# Patient Record
Sex: FEMALE | Race: WHITE | Employment: OTHER | URBAN - METROPOLITAN AREA
[De-identification: names, ages, dates, MRNs, and addresses within clinical notes are randomized per-mention and may not be internally consistent; named-entity substitution may affect disease eponyms.]

---

## 2019-05-16 ENCOUNTER — TELEPHONE (OUTPATIENT)
Dept: PRIMARY CARE CLINIC | Age: 64
End: 2019-05-16

## 2019-05-16 DIAGNOSIS — G56.03 BILATERAL CARPAL TUNNEL SYNDROME: Primary | ICD-10-CM

## 2019-05-20 DIAGNOSIS — G56.03 BILATERAL CARPAL TUNNEL SYNDROME: Primary | ICD-10-CM

## 2019-05-31 ENCOUNTER — HOSPITAL ENCOUNTER (OUTPATIENT)
Dept: NEUROLOGY | Age: 64
Discharge: HOME | End: 2019-05-31
Payer: COMMERCIAL

## 2019-05-31 PROCEDURE — 95911 NRV CNDJ TEST 9-10 STUDIES: CPT

## 2019-05-31 PROCEDURE — 95886 MUSC TEST DONE W/N TEST COMP: CPT | Performed by: PSYCHIATRY & NEUROLOGY

## 2019-05-31 PROCEDURE — 95886 MUSC TEST DONE W/N TEST COMP: CPT

## 2019-05-31 PROCEDURE — 95911 NRV CNDJ TEST 9-10 STUDIES: CPT | Performed by: PSYCHIATRY & NEUROLOGY

## 2019-05-31 NOTE — PROCEDURES
paraspinals (low) N None None None None N N N N   L. Deltoid N None None None None N N N N   L. Triceps brachii N None None None None N N N N   L. Biceps brachii N None None None None N N N N   L. Brachioradialis N None None None None N N N N   L. Extensor indicis proprius N None None None None N N N N   L. Flexor digitorum profundus (Ulnar) N None None None None N N N N   L. Flexor pollicis longus N None None None None N N N N   L. First dorsal interosseous N None None None None N N N N   L. Abductor pollicis brevis Incr 1+ 1+ None None N N 1+ Decr         Nerve conduction studies of both arms disclosed the following abnormalities----absent compound motor potentials in the left median nerve, recording of the adductor pollicis brevis muscle. The distal motor latency of the right median nerve was moderately delayed, with decreased compound motor potentials. Sensory nerve potentials were not recorded from the left median nerve; there was minimal prolongation of the right median palmar and distal sensory latencies, with slightly decreased sensory nerve conduction velocities. The left F wave was not recorded; the right median F-wave latency was slightly prolonged. These findings were compared to the referential values in this laboratory, available upon request.    Monopolar examination of the left arm disclosed marked acute and chronic denervation changes in the adductor pollicis brevis muscle alone. Needle testing of the paraspinals proved unrevealing. Electrodiagnostic examination of both arms disclosed evidence diagnostic of bilateral median neuropathies at/or distal to the wrists---the left of marked severity and the right of moderate severity. These findings were consistent with carpal tunnel syndrome. There were no other peripheral neuropathies. There were no motor radiculopathies or intracanalicular lesions. Sensory radiculopathies cannot be evaluated by electrodiagnostic means.     Clinically, the patient presented with marked pains and tingling in the left hand and wrist.  On brief neurological examination, I found Tinel signs at both wrists as well as atrophy in the left abductor pollicis brevis muscle. There was sensory loss along the median nerve distributions in both hands. Her difficulties are the result of her severe left carpal tunnel syndrome. I recommended carpal tunnel release in that hand soon. Clinical correlation was highly advised.

## 2019-06-10 NOTE — TELEPHONE ENCOUNTER
Does the pended wrist brace need sent somewhere or was it given in the office?  Once that order is signed or removed you should be able to close the chart

## 2019-06-20 ENCOUNTER — PREP FOR PROCEDURE (OUTPATIENT)
Dept: ORTHOPEDIC SURGERY | Age: 64
End: 2019-06-20

## 2019-06-20 RX ORDER — SODIUM CHLORIDE 0.9 % (FLUSH) 0.9 %
10 SYRINGE (ML) INJECTION PRN
Status: CANCELLED | OUTPATIENT
Start: 2019-06-20

## 2019-06-20 RX ORDER — SODIUM CHLORIDE 0.9 % (FLUSH) 0.9 %
10 SYRINGE (ML) INJECTION EVERY 12 HOURS SCHEDULED
Status: CANCELLED | OUTPATIENT
Start: 2019-06-20

## 2019-06-26 RX ORDER — M-VIT,TX,IRON,MINS/CALC/FOLIC 27MG-0.4MG
1 TABLET ORAL DAILY
Status: ON HOLD | COMMUNITY
End: 2020-02-22 | Stop reason: HOSPADM

## 2019-06-26 RX ORDER — LEVOTHYROXINE SODIUM 0.05 MG/1
50 TABLET ORAL DAILY
COMMUNITY
End: 2019-09-03 | Stop reason: SDUPTHER

## 2019-06-26 RX ORDER — GABAPENTIN 100 MG/1
100 CAPSULE ORAL NIGHTLY
COMMUNITY
End: 2019-09-03 | Stop reason: SDUPTHER

## 2019-06-26 RX ORDER — ACETAMINOPHEN 160 MG
5000 TABLET,DISINTEGRATING ORAL DAILY
COMMUNITY

## 2019-06-26 RX ORDER — FLUTICASONE PROPIONATE 50 MCG
1 SPRAY, SUSPENSION (ML) NASAL DAILY
COMMUNITY
End: 2019-09-03 | Stop reason: SDUPTHER

## 2019-06-26 RX ORDER — TRAMADOL HYDROCHLORIDE 50 MG/1
50 TABLET ORAL EVERY 8 HOURS PRN
Status: ON HOLD | COMMUNITY
End: 2019-07-01 | Stop reason: HOSPADM

## 2019-06-26 RX ORDER — MELOXICAM 15 MG/1
15 TABLET ORAL DAILY
COMMUNITY
End: 2019-09-05 | Stop reason: SDUPTHER

## 2019-06-26 RX ORDER — IPRATROPIUM BROMIDE AND ALBUTEROL SULFATE 2.5; .5 MG/3ML; MG/3ML
1 SOLUTION RESPIRATORY (INHALATION) EVERY 4 HOURS PRN
COMMUNITY

## 2019-06-26 RX ORDER — GLIMEPIRIDE 1 MG/1
1 TABLET ORAL
COMMUNITY
End: 2019-09-03 | Stop reason: SDUPTHER

## 2019-06-26 RX ORDER — CITALOPRAM 40 MG/1
40 TABLET ORAL DAILY
COMMUNITY
End: 2019-09-03 | Stop reason: SDUPTHER

## 2019-06-26 RX ORDER — LORATADINE 10 MG/1
10 CAPSULE, LIQUID FILLED ORAL DAILY
COMMUNITY

## 2019-06-26 RX ORDER — HYDROCHLOROTHIAZIDE 12.5 MG/1
12.5 CAPSULE, GELATIN COATED ORAL DAILY
COMMUNITY
End: 2019-09-03 | Stop reason: SDUPTHER

## 2019-06-26 RX ORDER — BUDESONIDE AND FORMOTEROL FUMARATE DIHYDRATE 160; 4.5 UG/1; UG/1
2 AEROSOL RESPIRATORY (INHALATION) 2 TIMES DAILY
COMMUNITY
End: 2020-02-18 | Stop reason: ALTCHOICE

## 2019-07-01 ENCOUNTER — HOSPITAL ENCOUNTER (OUTPATIENT)
Age: 64
Setting detail: OUTPATIENT SURGERY
Discharge: HOME | End: 2019-07-01
Attending: ORTHOPAEDIC SURGERY | Admitting: ORTHOPAEDIC SURGERY
Payer: COMMERCIAL

## 2019-07-01 ENCOUNTER — ANESTHESIA EVENT (OUTPATIENT)
Dept: OPERATING ROOM | Age: 64
End: 2019-07-01
Payer: COMMERCIAL

## 2019-07-01 ENCOUNTER — PREP FOR PROCEDURE (OUTPATIENT)
Dept: ORTHOPEDIC SURGERY | Age: 64
End: 2019-07-01

## 2019-07-01 ENCOUNTER — ANESTHESIA (OUTPATIENT)
Dept: OPERATING ROOM | Age: 64
End: 2019-07-01
Payer: COMMERCIAL

## 2019-07-01 VITALS
OXYGEN SATURATION: 96 % | HEIGHT: 58 IN | BODY MASS INDEX: 39.88 KG/M2 | DIASTOLIC BLOOD PRESSURE: 76 MMHG | WEIGHT: 190 LBS | HEART RATE: 88 BPM | RESPIRATION RATE: 20 BRPM | SYSTOLIC BLOOD PRESSURE: 134 MMHG | TEMPERATURE: 97 F

## 2019-07-01 VITALS — DIASTOLIC BLOOD PRESSURE: 73 MMHG | SYSTOLIC BLOOD PRESSURE: 134 MMHG | OXYGEN SATURATION: 87 %

## 2019-07-01 DIAGNOSIS — G89.18 POST-OP PAIN: Primary | ICD-10-CM

## 2019-07-01 LAB — METER GLUCOSE: 222 MG/DL (ref 74–99)

## 2019-07-01 PROCEDURE — 6360000002 HC RX W HCPCS: Performed by: NURSE ANESTHETIST, CERTIFIED REGISTERED

## 2019-07-01 PROCEDURE — 7100000010 HC PHASE II RECOVERY - FIRST 15 MIN: Performed by: ORTHOPAEDIC SURGERY

## 2019-07-01 PROCEDURE — 2709999900 HC NON-CHARGEABLE SUPPLY: Performed by: ORTHOPAEDIC SURGERY

## 2019-07-01 PROCEDURE — 3600000002 HC SURGERY LEVEL 2 BASE: Performed by: ORTHOPAEDIC SURGERY

## 2019-07-01 PROCEDURE — 7100000011 HC PHASE II RECOVERY - ADDTL 15 MIN: Performed by: ORTHOPAEDIC SURGERY

## 2019-07-01 PROCEDURE — 3600000012 HC SURGERY LEVEL 2 ADDTL 15MIN: Performed by: ORTHOPAEDIC SURGERY

## 2019-07-01 PROCEDURE — 2500000003 HC RX 250 WO HCPCS: Performed by: ORTHOPAEDIC SURGERY

## 2019-07-01 PROCEDURE — 6360000002 HC RX W HCPCS: Performed by: PHYSICIAN ASSISTANT

## 2019-07-01 PROCEDURE — 2580000003 HC RX 258: Performed by: NURSE ANESTHETIST, CERTIFIED REGISTERED

## 2019-07-01 PROCEDURE — 82962 GLUCOSE BLOOD TEST: CPT

## 2019-07-01 PROCEDURE — 3700000000 HC ANESTHESIA ATTENDED CARE: Performed by: ORTHOPAEDIC SURGERY

## 2019-07-01 PROCEDURE — 3700000001 HC ADD 15 MINUTES (ANESTHESIA): Performed by: ORTHOPAEDIC SURGERY

## 2019-07-01 RX ORDER — FENTANYL CITRATE 50 UG/ML
INJECTION, SOLUTION INTRAMUSCULAR; INTRAVENOUS PRN
Status: DISCONTINUED | OUTPATIENT
Start: 2019-07-01 | End: 2019-07-01 | Stop reason: SDUPTHER

## 2019-07-01 RX ORDER — SODIUM CHLORIDE 0.9 % (FLUSH) 0.9 %
10 SYRINGE (ML) INJECTION EVERY 12 HOURS SCHEDULED
Status: DISCONTINUED | OUTPATIENT
Start: 2019-07-01 | End: 2019-07-01 | Stop reason: HOSPADM

## 2019-07-01 RX ORDER — PROPOFOL 10 MG/ML
INJECTION, EMULSION INTRAVENOUS PRN
Status: DISCONTINUED | OUTPATIENT
Start: 2019-07-01 | End: 2019-07-01 | Stop reason: SDUPTHER

## 2019-07-01 RX ORDER — PROPOFOL 10 MG/ML
INJECTION, EMULSION INTRAVENOUS CONTINUOUS PRN
Status: DISCONTINUED | OUTPATIENT
Start: 2019-07-01 | End: 2019-07-01 | Stop reason: SDUPTHER

## 2019-07-01 RX ORDER — SODIUM CHLORIDE 0.9 % (FLUSH) 0.9 %
10 SYRINGE (ML) INJECTION PRN
Status: DISCONTINUED | OUTPATIENT
Start: 2019-07-01 | End: 2019-07-01 | Stop reason: HOSPADM

## 2019-07-01 RX ORDER — ONDANSETRON 4 MG/1
4 TABLET, FILM COATED ORAL EVERY 8 HOURS PRN
Qty: 14 TABLET | Refills: 1 | Status: ON HOLD | OUTPATIENT
Start: 2019-07-01 | End: 2020-02-22 | Stop reason: HOSPADM

## 2019-07-01 RX ORDER — HYDROCODONE BITARTRATE AND ACETAMINOPHEN 5; 325 MG/1; MG/1
1 TABLET ORAL
Qty: 20 TABLET | Refills: 0 | Status: SHIPPED | OUTPATIENT
Start: 2019-07-01 | End: 2019-07-08

## 2019-07-01 RX ORDER — SODIUM CHLORIDE, SODIUM LACTATE, POTASSIUM CHLORIDE, CALCIUM CHLORIDE 600; 310; 30; 20 MG/100ML; MG/100ML; MG/100ML; MG/100ML
INJECTION, SOLUTION INTRAVENOUS CONTINUOUS PRN
Status: DISCONTINUED | OUTPATIENT
Start: 2019-07-01 | End: 2019-07-01 | Stop reason: SDUPTHER

## 2019-07-01 RX ORDER — HYDROCODONE BITARTRATE AND ACETAMINOPHEN 5; 325 MG/1; MG/1
1 TABLET ORAL PRN
Status: DISCONTINUED | OUTPATIENT
Start: 2019-07-01 | End: 2019-07-01 | Stop reason: HOSPADM

## 2019-07-01 RX ORDER — ONDANSETRON 2 MG/ML
INJECTION INTRAMUSCULAR; INTRAVENOUS PRN
Status: DISCONTINUED | OUTPATIENT
Start: 2019-07-01 | End: 2019-07-01 | Stop reason: SDUPTHER

## 2019-07-01 RX ORDER — MIDAZOLAM HYDROCHLORIDE 1 MG/ML
INJECTION INTRAMUSCULAR; INTRAVENOUS PRN
Status: DISCONTINUED | OUTPATIENT
Start: 2019-07-01 | End: 2019-07-01 | Stop reason: SDUPTHER

## 2019-07-01 RX ADMIN — FENTANYL CITRATE 50 MCG: 50 INJECTION, SOLUTION INTRAMUSCULAR; INTRAVENOUS at 14:18

## 2019-07-01 RX ADMIN — PROPOFOL 100 MG: 10 INJECTION, EMULSION INTRAVENOUS at 13:59

## 2019-07-01 RX ADMIN — Medication 2 G: at 13:55

## 2019-07-01 RX ADMIN — SODIUM CHLORIDE, POTASSIUM CHLORIDE, SODIUM LACTATE AND CALCIUM CHLORIDE: 600; 310; 30; 20 INJECTION, SOLUTION INTRAVENOUS at 14:30

## 2019-07-01 RX ADMIN — SODIUM CHLORIDE, POTASSIUM CHLORIDE, SODIUM LACTATE AND CALCIUM CHLORIDE: 600; 310; 30; 20 INJECTION, SOLUTION INTRAVENOUS at 13:55

## 2019-07-01 RX ADMIN — MIDAZOLAM HYDROCHLORIDE 2 MG: 1 INJECTION, SOLUTION INTRAMUSCULAR; INTRAVENOUS at 13:55

## 2019-07-01 RX ADMIN — ONDANSETRON HYDROCHLORIDE 4 MG: 2 INJECTION, SOLUTION INTRAMUSCULAR; INTRAVENOUS at 14:20

## 2019-07-01 RX ADMIN — FENTANYL CITRATE 50 MCG: 50 INJECTION, SOLUTION INTRAMUSCULAR; INTRAVENOUS at 13:59

## 2019-07-01 RX ADMIN — PROPOFOL 50 MCG/KG/MIN: 10 INJECTION, EMULSION INTRAVENOUS at 13:59

## 2019-07-01 ASSESSMENT — PULMONARY FUNCTION TESTS
PIF_VALUE: 1
PIF_VALUE: 5
PIF_VALUE: 1
PIF_VALUE: 7
PIF_VALUE: 1
PIF_VALUE: 5
PIF_VALUE: 1

## 2019-07-01 ASSESSMENT — LIFESTYLE VARIABLES: SMOKING_STATUS: 0

## 2019-07-01 ASSESSMENT — PAIN - FUNCTIONAL ASSESSMENT: PAIN_FUNCTIONAL_ASSESSMENT: 0-10

## 2019-07-01 ASSESSMENT — ENCOUNTER SYMPTOMS: SHORTNESS OF BREATH: 0

## 2019-07-01 ASSESSMENT — PAIN DESCRIPTION - DESCRIPTORS: DESCRIPTORS: THROBBING;NUMBNESS;TINGLING

## 2019-07-01 NOTE — DISCHARGE INSTR - COC
Mobility/ADLs:  Walking   {CHP DME LCLK:582661240}  Transfer  {CHP DME WXBA:871149747}  Bathing  {CHP DME TDTR:091240425}  Dressing  {CHP DME BXCN:034180967}  Toileting  {CHP DME KSQM:232381809}  Feeding  {CHP DME JENNIFER:217265416}  Med Admin  {P DME PNSH:676606058}  Med Delivery   {[facility] MED Delivery:286397033}    Wound Care Documentation and Therapy:        Elimination:  Continence:   · Bowel: {YES / DI:00811}  · Bladder: {YES / CQ:00407}  Urinary Catheter: {Urinary Catheter:686868149}   Colostomy/Ileostomy/Ileal Conduit: {YES / ZM:82596}       Date of Last BM: ***  No intake or output data in the 24 hours ending 19 1410  No intake/output data recorded.     Safety Concerns:     [employer] Safety Concerns:234513204}    Impairments/Disabilities:      [employer] Impairments/Disabilities:686345621}    Nutrition Therapy:  Current Nutrition Therapy:   [employer] Diet List:281079105}    Routes of Feeding: {[facility] DME Other Feedings:052024648}  Liquids: {Slp liquid thickness:51197}  Daily Fluid Restriction: {CHP DME Yes amt example:027081354}  Last Modified Barium Swallow with Video (Video Swallowing Test): {Done Not Done IMEJ:978809048}    Treatments at the Time of Hospital Discharge:   Respiratory Treatments: ***  Oxygen Therapy:  {Therapy; copd oxygen:13975}  Ventilator:    { CC Vent YBHK:466085214}    Rehab Therapies: {THERAPEUTIC INTERVENTION:5230340005}  Weight Bearing Status/Restrictions: [employer] Weight Bearin}  Other Medical Equipment (for information only, NOT a DME order):  {EQUIPMENT:213400003}  Other Treatments: ***    Patient's personal belongings (please select all that are sent with patient):  {[facility] DME Belongings:843708955}    RN SIGNATURE:  {Esignature:812638141}    CASE MANAGEMENT/SOCIAL WORK SECTION    Inpatient Status Date: ***    Readmission Risk Assessment Score:  Readmission Risk              Risk of Unplanned Readmission:        4           Discharging to Facility/ Agency   · Name: · Address:  · Phone:  · Fax:    Dialysis Facility (if applicable)   · Name:  · Address:  · Dialysis Schedule:  · Phone:  · Fax:    / signature: {Esignature:151116546}    PHYSICIAN SECTION    Prognosis: {Prognosis:1000855800}    Condition at Discharge: Blanca Wilson Patient Condition:546095761}    Rehab Potential (if transferring to Rehab): {Prognosis:6170829630}    Recommended Labs or Other Treatments After Discharge: ***    Physician Certification: I certify the above information and transfer of Estella Oliveira  is necessary for the continuing treatment of the diagnosis listed and that she requires {Admit to Appropriate Level of Care:49529} for {GREATER/LESS:586852006} 30 days.      Update Admission H&P: {CHP DME Changes in ZNZRM:564509609}    PHYSICIAN SIGNATURE:  {Esignature:585671948}

## 2019-07-01 NOTE — PROGRESS NOTES
Pt would like to leave underwear on
products on the day of surgery    [x] If not already done, you can expect a call from registration    [x] You can expect a call the business day prior to procedure to notify you if your arrival time changes    [x] If you receive a survey after surgery we would greatly appreciate your comments    [] Parent/guardian of a minor must accompany their child and remain on the premises  the entire time they are under our care     [] Pediatric patients may bring favorite toy, blanket or comfort item with them    [] A caregiver or family member must remain with the patient during their stay if they are mentally handicapped, have dementia, disoriented or unable to use a call light or would be a safety concern if left unattended    [x] Please notify surgeon if you develop any illness between now and time of surgery (cold, cough, sore throat, fever, nausea, vomiting) or any signs of infections  including skin, wounds, and dental.    [x]  The Outpatient Pharmacy is available to fill your prescription here on your day of surgery, ask your preop nurse for details    [] Other instructions  EDUCATIONAL MATERIALS PROVIDED:    [] PAT Preoperative Education Packet/Booklet     [] Medication List    [] Fluoroscopy Information Pamphlet    [] Transfusion bracelet applied with instructions    [] Joint replacement video reviewed    [] Shower with soap, lather and rinse well, and use CHG wipes provided the evening before surgery as instructed

## 2019-07-01 NOTE — ANESTHESIA PRE PROCEDURE
Vitamins-Minerals (THERAPEUTIC MULTIVITAMIN-MINERALS) tablet Take 1 tablet by mouth daily Ld 2019   Yes Historical Provider, MD   Cholecalciferol (VITAMIN D3) 2000 units CAPS Take by mouth daily Ld 2019   Yes Historical Provider, MD   Coenzyme Q10 (COQ-10) 10 MG CAPS Take by mouth daily Ld 2019   Yes Historical Provider, MD   aspirin 81 MG tablet Take 81 mg by mouth daily Ld 2019   Yes Historical Provider, MD   OXYGEN Inhale 2 L into the lungs as needed   Yes Historical Provider, MD   PROAIR  (90 Base) MCG/ACT inhaler INHALE TWO PUFFS BY MOUTH FOUR TIMES A DAY AS NEEDED 19  Yes Adi Sesay,        Current medications:    Current Facility-Administered Medications   Medication Dose Route Frequency Provider Last Rate Last Dose    ceFAZolin (ANCEF) 2 g in dextrose 5 % 100 mL IVPB  2 g Intravenous On Call to [address] [place] [address]        sodium chloride flush 0.9 % injection 10 mL  10 mL Intravenous 2 times per day Marlyn Antonio PA-C        sodium chloride flush 0.9 % injection 10 mL  10 mL Intravenous PRN Marlyn Antonio PA-C           Allergies: Allergies   Allergen Reactions    Macrobid [Nitrofurantoin Macrocrystal] Other (See Comments)     Exacerbates asthma       Problem List:  There is no problem list on file for this patient.       Past Medical History:        Diagnosis Date    Arthritis     Asthma     Carpal tunnel syndrome, left     COPD (chronic obstructive pulmonary disease) (HCC)     Depression     Diabetes mellitus ([facility] [address]     Hypertension     Thyroid disease        Past Surgical History:        Procedure Laterality Date     SECTION      times 3    HERNIA REPAIR      ROTATOR CUFF REPAIR Right     TONSILLECTOMY AND ADENOIDECTOMY      TOTAL KNEE ARTHROPLASTY Left     TUBAL LIGATION         Social History:    Social History     Tobacco Use    Smoking status: Former Smoker    Smokeless tobacco: Never Used   Substance Use Topics   

## 2019-07-09 ENCOUNTER — TELEPHONE (OUTPATIENT)
Dept: PRIMARY CARE CLINIC | Age: 64
End: 2019-07-09

## 2019-07-09 DIAGNOSIS — J43.9 PULMONARY EMPHYSEMA, UNSPECIFIED EMPHYSEMA TYPE (HCC): Primary | ICD-10-CM

## 2019-07-22 NOTE — ANESTHESIA POSTPROCEDURE EVALUATION
Department of Anesthesiology  Postprocedure Note    Patient: Marily Ortiz  MRN: [id number]  [place]: 1955  Date of evaluation: 7/22/2019  Time:  9:28 AM     Procedure Summary     Date:  07/01/19 Room / Location:  [facility] OR 03 / [facility] OR    Anesthesia Start:  1355 Anesthesia Stop:  1500    Procedure:  LEFT CARPAL TUNNEL RELEASE (Left ) Diagnosis:  (CARPAL TUNNEL SYNDROME LEFT HAND)    Surgeon:  Ashu Proctor MD Responsible Provider:  Padmini Keita MD    Anesthesia Type:  MAC ASA Status:  3          Anesthesia Type: MAC    Kyle Phase I: Kyle Score: 9    Kyle Phase II: Kyle Score: 9    Last vitals: Reviewed and per EMR flowsheets.        Anesthesia Post Evaluation    Patient location during evaluation: PACU  Patient participation: complete - patient participated  Level of consciousness: awake and alert  Airway patency: patent  Nausea & Vomiting: no vomiting and no nausea  Complications: no  Cardiovascular status: blood pressure returned to baseline  Respiratory status: acceptable  Hydration status: euvolemic

## 2019-08-07 ENCOUNTER — HOSPITAL ENCOUNTER (OUTPATIENT)
Dept: CT IMAGING | Age: 64
Discharge: HOME | End: 2019-08-09
Payer: COMMERCIAL

## 2019-08-07 DIAGNOSIS — M19.111 POST-TRAUMATIC ARTHROSIS OF RIGHT SHOULDER: ICD-10-CM

## 2019-08-07 DIAGNOSIS — M19.011 ARTHRITIS OF RIGHT SHOULDER REGION: ICD-10-CM

## 2019-08-07 PROCEDURE — 73200 CT UPPER EXTREMITY W/O DYE: CPT

## 2019-08-26 ENCOUNTER — TELEPHONE (OUTPATIENT)
Dept: PRIMARY CARE CLINIC | Age: 64
End: 2019-08-26

## 2019-08-26 DIAGNOSIS — E11.9 TYPE 2 DIABETES MELLITUS WITHOUT COMPLICATION, WITHOUT LONG-TERM CURRENT USE OF INSULIN (HCC): ICD-10-CM

## 2019-08-26 DIAGNOSIS — E78.2 MIXED HYPERLIPIDEMIA: ICD-10-CM

## 2019-08-26 DIAGNOSIS — E03.9 ACQUIRED HYPOTHYROIDISM: ICD-10-CM

## 2019-08-26 DIAGNOSIS — I10 ESSENTIAL HYPERTENSION: Primary | ICD-10-CM

## 2019-08-29 ENCOUNTER — HOSPITAL ENCOUNTER (OUTPATIENT)
Age: 64
Discharge: HOME | End: 2019-08-29
Payer: COMMERCIAL

## 2019-08-29 ENCOUNTER — HOSPITAL ENCOUNTER (OUTPATIENT)
Dept: PULMONOLOGY | Age: 64
Discharge: HOME | End: 2019-08-29
Payer: COMMERCIAL

## 2019-08-29 DIAGNOSIS — J44.9 STAGE 4 VERY SEVERE COPD BY GOLD CLASSIFICATION (HCC): ICD-10-CM

## 2019-08-29 DIAGNOSIS — E03.9 ACQUIRED HYPOTHYROIDISM: ICD-10-CM

## 2019-08-29 DIAGNOSIS — I10 ESSENTIAL HYPERTENSION: ICD-10-CM

## 2019-08-29 DIAGNOSIS — E11.9 TYPE 2 DIABETES MELLITUS WITHOUT COMPLICATION, WITHOUT LONG-TERM CURRENT USE OF INSULIN (HCC): ICD-10-CM

## 2019-08-29 DIAGNOSIS — E78.2 MIXED HYPERLIPIDEMIA: ICD-10-CM

## 2019-08-29 LAB
ALBUMIN SERPL-MCNC: 4.4 G/DL (ref 3.5–5.2)
ALP BLD-CCNC: 93 U/L (ref 35–104)
ALT SERPL-CCNC: 15 U/L (ref 0–32)
ANION GAP SERPL CALCULATED.3IONS-SCNC: 15 MMOL/L (ref 7–16)
AST SERPL-CCNC: 12 U/L (ref 0–31)
BASOPHILS ABSOLUTE: 0.05 E9/L (ref 0–0.2)
BASOPHILS RELATIVE PERCENT: 0.2 % (ref 0–2)
BILIRUB SERPL-MCNC: <0.2 MG/DL (ref 0–1.2)
BUN BLDV-MCNC: 22 MG/DL (ref 8–23)
CALCIUM SERPL-MCNC: 9.7 MG/DL (ref 8.6–10.2)
CHLORIDE BLD-SCNC: 99 MMOL/L (ref 98–107)
CHOLESTEROL, TOTAL: 161 MG/DL (ref 0–199)
CO2: 25 MMOL/L (ref 22–29)
CREAT SERPL-MCNC: 0.8 MG/DL (ref 0.5–1)
EOSINOPHILS ABSOLUTE: 0.01 E9/L (ref 0.05–0.5)
EOSINOPHILS RELATIVE PERCENT: 0 % (ref 0–6)
GFR AFRICAN AMERICAN: >60
GFR NON-AFRICAN AMERICAN: >60 ML/MIN/1.73
GLUCOSE BLD-MCNC: 273 MG/DL (ref 74–99)
HBA1C MFR BLD: 8.4 % (ref 4–5.6)
HCT VFR BLD CALC: 43.2 % (ref 34–48)
HDLC SERPL-MCNC: 51 MG/DL
HEMOGLOBIN: 13.9 G/DL (ref 11.5–15.5)
IMMATURE GRANULOCYTES #: 0.27 E9/L
IMMATURE GRANULOCYTES %: 1.3 % (ref 0–5)
LDL CHOLESTEROL CALCULATED: 74 MG/DL (ref 0–99)
LYMPHOCYTES ABSOLUTE: 2.26 E9/L (ref 1.5–4)
LYMPHOCYTES RELATIVE PERCENT: 11 % (ref 20–42)
MCH RBC QN AUTO: 28 PG (ref 26–35)
MCHC RBC AUTO-ENTMCNC: 32.2 % (ref 32–34.5)
MCV RBC AUTO: 86.9 FL (ref 80–99.9)
MONOCYTES ABSOLUTE: 1 E9/L (ref 0.1–0.95)
MONOCYTES RELATIVE PERCENT: 4.9 % (ref 2–12)
NEUTROPHILS ABSOLUTE: 16.9 E9/L (ref 1.8–7.3)
NEUTROPHILS RELATIVE PERCENT: 82.6 % (ref 43–80)
PDW BLD-RTO: 13.2 FL (ref 11.5–15)
PLATELET # BLD: 328 E9/L (ref 130–450)
PMV BLD AUTO: 11.6 FL (ref 7–12)
POTASSIUM SERPL-SCNC: 4.8 MMOL/L (ref 3.5–5)
RBC # BLD: 4.97 E12/L (ref 3.5–5.5)
SODIUM BLD-SCNC: 139 MMOL/L (ref 132–146)
T4 TOTAL: 8.9 MCG/DL (ref 4.5–11.7)
TOTAL PROTEIN: 7.9 G/DL (ref 6.4–8.3)
TRIGL SERPL-MCNC: 180 MG/DL (ref 0–149)
TSH SERPL DL<=0.05 MIU/L-ACNC: 0.99 UIU/ML (ref 0.27–4.2)
VLDLC SERPL CALC-MCNC: 36 MG/DL
WBC # BLD: 20.5 E9/L (ref 4.5–11.5)

## 2019-08-29 PROCEDURE — 84436 ASSAY OF TOTAL THYROXINE: CPT

## 2019-08-29 PROCEDURE — 83036 HEMOGLOBIN GLYCOSYLATED A1C: CPT

## 2019-08-29 PROCEDURE — 94729 DIFFUSING CAPACITY: CPT

## 2019-08-29 PROCEDURE — 85025 COMPLETE CBC W/AUTO DIFF WBC: CPT

## 2019-08-29 PROCEDURE — 94060 EVALUATION OF WHEEZING: CPT

## 2019-08-29 PROCEDURE — 80053 COMPREHEN METABOLIC PANEL: CPT

## 2019-08-29 PROCEDURE — 36415 COLL VENOUS BLD VENIPUNCTURE: CPT

## 2019-08-29 PROCEDURE — 94726 PLETHYSMOGRAPHY LUNG VOLUMES: CPT

## 2019-08-29 PROCEDURE — 80061 LIPID PANEL: CPT

## 2019-08-29 PROCEDURE — 84443 ASSAY THYROID STIM HORMONE: CPT

## 2019-08-29 RX ORDER — PROMETHAZINE HYDROCHLORIDE AND CODEINE PHOSPHATE 6.25; 1 MG/5ML; MG/5ML
5 SOLUTION ORAL EVERY 4 HOURS PRN
COMMUNITY
End: 2020-02-18 | Stop reason: ALTCHOICE

## 2019-08-30 ENCOUNTER — TELEPHONE (OUTPATIENT)
Dept: CARDIOLOGY CLINIC | Age: 64
End: 2019-08-30

## 2019-08-30 ENCOUNTER — OFFICE VISIT (OUTPATIENT)
Dept: PRIMARY CARE CLINIC | Age: 64
End: 2019-08-30
Payer: COMMERCIAL

## 2019-08-30 VITALS
HEIGHT: 58 IN | WEIGHT: 208 LBS | BODY MASS INDEX: 43.66 KG/M2 | DIASTOLIC BLOOD PRESSURE: 83 MMHG | TEMPERATURE: 99 F | SYSTOLIC BLOOD PRESSURE: 138 MMHG

## 2019-08-30 DIAGNOSIS — Z01.818 PRE-OP EVALUATION: Primary | ICD-10-CM

## 2019-08-30 DIAGNOSIS — I10 ESSENTIAL HYPERTENSION: ICD-10-CM

## 2019-08-30 DIAGNOSIS — E11.9 TYPE 2 DIABETES MELLITUS WITHOUT COMPLICATION, WITHOUT LONG-TERM CURRENT USE OF INSULIN (HCC): ICD-10-CM

## 2019-08-30 DIAGNOSIS — R94.31 EKG ABNORMALITIES: ICD-10-CM

## 2019-08-30 DIAGNOSIS — J43.9 PULMONARY EMPHYSEMA, UNSPECIFIED EMPHYSEMA TYPE (HCC): ICD-10-CM

## 2019-08-30 PROCEDURE — 93000 ELECTROCARDIOGRAM COMPLETE: CPT | Performed by: FAMILY MEDICINE

## 2019-08-30 PROCEDURE — 99214 OFFICE O/P EST MOD 30 MIN: CPT | Performed by: FAMILY MEDICINE

## 2019-08-30 ASSESSMENT — ENCOUNTER SYMPTOMS
GASTROINTESTINAL NEGATIVE: 1
EYES NEGATIVE: 1

## 2019-08-30 NOTE — TELEPHONE ENCOUNTER
Pt was referred by Dr Ted Knox to cardio for a pre op exam, has had EKG changes. Dr Morenita Rae is doing a shoulder revision, not yet scheduled. Pt is scheduled with Dr Fredo Lane on 09-19-19, cardiac hx to be scanned.

## 2019-08-30 NOTE — PROGRESS NOTES
19  Name: Mary Beth Romero    : 1955    Sex: female    Age: 62 y.o. Subjective:  Chief Complaint: Patient is here for daibetes ck and  Pre op cleraance     She not weariong c pap at hs            breing  [facility]   wering   02    At   2    She is seeing  Dr Emil Sanchez  And     erica maldonado ertdavid gt  New  Sleep study   Dr Silvia Edgar giving  Tramadol      Eats lots of  sweets      Review of Systems   Eyes: Negative. Respiratory:        Breathing better with Trelogy   Cardiovascular: Negative. Gastrointestinal: Negative. Genitourinary: Negative. Musculoskeletal:        Right shoudler pain with any motion         Current Outpatient Medications:     Handicap Placard MISC, by Does not apply route Copd      5 yrs, Disp: 1 each, Rfl: 0    promethazine-codeine (PHENERGAN WITH CODEINE) 6.25-10 MG/5ML SOLN solution, Take 5 mLs by mouth every 4 hours as needed for Cough. , Disp: , Rfl:     fluticasone-salmeterol (ADVAIR) 100-50 MCG/DOSE diskus inhaler, Inhale 1 puff into the lungs 2 times daily, Disp: , Rfl:     traMADol (ULTRAM) 50 MG tablet, Take 50 mg by mouth 4 times daily. , Disp: , Rfl:     ondansetron (ZOFRAN) 4 MG tablet, Take 1 tablet by mouth every 8 hours as needed for Nausea or Vomiting (Patient not taking: Reported on 2019), Disp: 14 tablet, Rfl: 1    metFORMIN (GLUCOPHAGE) 500 MG tablet, Take 500 mg by mouth 2 times daily (with meals), Disp: , Rfl:     meloxicam (MOBIC) 15 MG tablet, Take 15 mg by mouth daily, Disp: , Rfl:     levothyroxine (SYNTHROID) 50 MCG tablet, Take 50 mcg by mouth Daily, Disp: , Rfl:     hydrochlorothiazide (MICROZIDE) 12.5 MG capsule, Take 12.5 mg by mouth daily, Disp: , Rfl:     citalopram (CELEXA) 40 MG tablet, Take 40 mg by mouth daily Instructed to take am of procedure, Disp: , Rfl:     glimepiride (AMARYL) 1 MG tablet, Take 1 mg by mouth Daily with supper, Disp: , Rfl:     budesonide-formoterol (SYMBICORT) 160-4.5 MCG/ACT AERO, Inhale 2 puffs into the

## 2019-09-04 RX ORDER — ALBUTEROL SULFATE 90 UG/1
AEROSOL, METERED RESPIRATORY (INHALATION)
Qty: 25.5 G | Refills: 12 | Status: SHIPPED
Start: 2019-09-04 | End: 2020-10-31 | Stop reason: SDUPTHER

## 2019-09-04 RX ORDER — LEVOTHYROXINE SODIUM 0.05 MG/1
50 TABLET ORAL DAILY
Qty: 90 TABLET | Refills: 5 | Status: SHIPPED
Start: 2019-09-04 | End: 2020-10-31 | Stop reason: SDUPTHER

## 2019-09-04 RX ORDER — GLIMEPIRIDE 1 MG/1
1 TABLET ORAL
Qty: 180 TABLET | Refills: 12 | Status: SHIPPED
Start: 2019-09-04 | End: 2020-10-31 | Stop reason: SDUPTHER

## 2019-09-04 RX ORDER — FLUTICASONE PROPIONATE 50 MCG
1 SPRAY, SUSPENSION (ML) NASAL DAILY
Qty: 1 BOTTLE | Refills: 12 | Status: SHIPPED
Start: 2019-09-04 | End: 2020-10-31 | Stop reason: SDUPTHER

## 2019-09-04 RX ORDER — CITALOPRAM 40 MG/1
40 TABLET ORAL DAILY
Qty: 90 TABLET | Refills: 5 | Status: SHIPPED
Start: 2019-09-04 | End: 2020-09-04 | Stop reason: SDUPTHER

## 2019-09-04 RX ORDER — GABAPENTIN 100 MG/1
100 CAPSULE ORAL NIGHTLY
Qty: 90 CAPSULE | Refills: 3 | Status: SHIPPED
Start: 2019-09-04 | End: 2020-10-31 | Stop reason: SDUPTHER

## 2019-09-04 RX ORDER — HYDROCHLOROTHIAZIDE 12.5 MG/1
12.5 CAPSULE, GELATIN COATED ORAL DAILY
Qty: 90 CAPSULE | Refills: 5 | Status: SHIPPED
Start: 2019-09-04 | End: 2020-10-31 | Stop reason: SDUPTHER

## 2019-09-05 RX ORDER — MELOXICAM 15 MG/1
15 TABLET ORAL DAILY
Qty: 90 TABLET | Refills: 3 | Status: SHIPPED
Start: 2019-09-05 | End: 2020-02-18 | Stop reason: ALTCHOICE

## 2019-09-26 NOTE — PROCEDURES
[address] [address]                               PULMONARY FUNCTION    PATIENT NAME: Bharath Ivey                        :        1955  MED REC NO:   [id number]                            ROOM:  ACCOUNT NO:   [de-identified]                           ADMIT DATE: 2019  PROVIDER:     Pau Smith MD    DATE OF PROCEDURE:  2019    FINDINGS:  FVC is reduced. FEV1 is reduced. FEV1/FVC ratio is reduced. There is a significant increase in flow rates after the acute  administration of bronchodilators. Lung volumes evidenced air trapping. Diffusion capacity is reduced. IMPRESSION:  Bronchodilator responsive obstructive airways disease of  relatively mild nature. The decrease in diffusion capacity is  consistent with pulmonary parenchymal disease. Overall, the studies  would be consistent with GOLD class III chronic obstructive pulmonary  disease given the postbronchodilator numbers. Emphysematous changes are  suggested by the decrease in diffusion capacity. Clinical correlation  is advised.         Stacy Shearer MD    D: 2019 22:09:09       T: 2019 3:16:06     [email or web address]  Job#: [id number]     Doc#: [id number]    CC:

## 2019-10-03 ENCOUNTER — OFFICE VISIT (OUTPATIENT)
Dept: CARDIOLOGY CLINIC | Age: 64
End: 2019-10-03
Payer: COMMERCIAL

## 2019-10-03 VITALS
SYSTOLIC BLOOD PRESSURE: 136 MMHG | RESPIRATION RATE: 16 BRPM | DIASTOLIC BLOOD PRESSURE: 86 MMHG | WEIGHT: 208 LBS | BODY MASS INDEX: 43.66 KG/M2 | HEIGHT: 58 IN | HEART RATE: 87 BPM

## 2019-10-03 DIAGNOSIS — Z01.818 PRE-OPERATIVE CLEARANCE: Primary | ICD-10-CM

## 2019-10-03 PROCEDURE — 93000 ELECTROCARDIOGRAM COMPLETE: CPT | Performed by: INTERNAL MEDICINE

## 2019-10-03 PROCEDURE — 99244 OFF/OP CNSLTJ NEW/EST MOD 40: CPT | Performed by: INTERNAL MEDICINE

## 2019-10-03 RX ORDER — UMECLIDINIUM 62.5 UG/1
AEROSOL, POWDER ORAL
COMMUNITY
Start: 2019-09-25 | End: 2020-02-18 | Stop reason: ALTCHOICE

## 2019-10-03 RX ORDER — FLUTICASONE FUROATE AND VILANTEROL TRIFENATATE 100; 25 UG/1; UG/1
POWDER RESPIRATORY (INHALATION)
COMMUNITY
Start: 2019-09-25 | End: 2020-02-18 | Stop reason: ALTCHOICE

## 2020-02-10 ENCOUNTER — TELEPHONE (OUTPATIENT)
Dept: PRIMARY CARE CLINIC | Age: 65
End: 2020-02-10

## 2020-02-14 ENCOUNTER — OFFICE VISIT (OUTPATIENT)
Dept: PRIMARY CARE CLINIC | Age: 65
End: 2020-02-14
Payer: COMMERCIAL

## 2020-02-14 VITALS — SYSTOLIC BLOOD PRESSURE: 134 MMHG | WEIGHT: 208 LBS | BODY MASS INDEX: 40.62 KG/M2 | DIASTOLIC BLOOD PRESSURE: 82 MMHG

## 2020-02-14 PROBLEM — M19.011 PRIMARY OSTEOARTHRITIS OF RIGHT SHOULDER: Status: ACTIVE | Noted: 2020-02-14

## 2020-02-14 PROCEDURE — 99214 OFFICE O/P EST MOD 30 MIN: CPT | Performed by: FAMILY MEDICINE

## 2020-02-14 ASSESSMENT — PATIENT HEALTH QUESTIONNAIRE - PHQ9
SUM OF ALL RESPONSES TO PHQ QUESTIONS 1-9: 0
2. FEELING DOWN, DEPRESSED OR HOPELESS: 0
1. LITTLE INTEREST OR PLEASURE IN DOING THINGS: 0
SUM OF ALL RESPONSES TO PHQ9 QUESTIONS 1 & 2: 0
SUM OF ALL RESPONSES TO PHQ QUESTIONS 1-9: 0

## 2020-02-14 ASSESSMENT — ENCOUNTER SYMPTOMS
GASTROINTESTINAL NEGATIVE: 1
ALLERGIC/IMMUNOLOGIC NEGATIVE: 1
RESPIRATORY NEGATIVE: 1
EYES NEGATIVE: 1

## 2020-02-14 NOTE — PROGRESS NOTES
by mouth daily Instructed to take am of procedure, Disp: 90 tablet, Rfl: 5    glimepiride (AMARYL) 1 MG tablet, Take 1 tablet by mouth 2 times daily (before meals), Disp: 180 tablet, Rfl: 12    albuterol sulfate HFA (PROAIR HFA) 108 (90 Base) MCG/ACT inhaler, INHALE TWO PUFFS BY MOUTH FOUR TIMES A DAY AS NEEDED, Disp: 25.5 g, Rfl: 12    gabapentin (NEURONTIN) 100 MG capsule, Take 1 capsule by mouth nightly for 360 days. , Disp: 90 capsule, Rfl: 3    Handicap Placard MISC, by Does not apply route Copd      5 yrs, Disp: 1 each, Rfl: 0    promethazine-codeine (PHENERGAN WITH CODEINE) 6.25-10 MG/5ML SOLN solution, Take 5 mLs by mouth every 4 hours as needed for Cough. , Disp: , Rfl:     fluticasone-salmeterol (ADVAIR) 100-50 MCG/DOSE diskus inhaler, Inhale 1 puff into the lungs 2 times daily, Disp: , Rfl:     traMADol (ULTRAM) 50 MG tablet, Take 50 mg by mouth 4 times daily. , Disp: , Rfl:     ondansetron (ZOFRAN) 4 MG tablet, Take 1 tablet by mouth every 8 hours as needed for Nausea or Vomiting (Patient not taking: Reported on 8/14/2019), Disp: 14 tablet, Rfl: 1    budesonide-formoterol (SYMBICORT) 160-4.5 MCG/ACT AERO, Inhale 2 puffs into the lungs 2 times daily Instructed to take am of procedure, Disp: , Rfl:     ipratropium-albuterol (DUONEB) 0.5-2.5 (3) MG/3ML SOLN nebulizer solution, Inhale 1 vial into the lungs every 4 hours as needed for Shortness of Breath Instructed to take am of procedure if needed, Disp: , Rfl:     loratadine (CLARITIN) 10 MG capsule, Take 10 mg by mouth daily, Disp: , Rfl:     Multiple Vitamins-Minerals (THERAPEUTIC MULTIVITAMIN-MINERALS) tablet, Take 1 tablet by mouth daily Ld 6/26/2019, Disp: , Rfl:     Cholecalciferol (VITAMIN D3) 2000 units CAPS, Take 5,000 Units by mouth daily Ld 6/26/2019 , Disp: , Rfl:     Coenzyme Q10 (COQ-10) 10 MG CAPS, Take by mouth daily Ld 6/26/2019, Disp: , Rfl:     aspirin 81 MG tablet, Take 81 mg by mouth daily Ld 6/23/2019, Disp: , Rfl: MENISCUS L KNEE OR 2000 DR DIEHL------SEVERE OA L KNEE ON X RAY 9-11    EMG 9-14 SEVERE CTS L MOD-SEV R---REFER TO DR STEPHENSON    INJECTIONS KNEE DR GTZ    LEFT TOTAL KNEE OR 2-15 DR GTZ--    R CARPAL TUNNEL SURGEYR 3-15 DR GTZ    VITROUS SEPARATION LEFT EYE 11-15    R SHOULDER OR TORN ROTATOR CUFF 6-16 DR SURESH---PT NOT HAPPY    PT MOVED BACK HOME 2-17    VENTRAL HERNIA OR DR Lorena DIANA 9-17    EVAL DR MARROQUIN 8-18 COPD ADDED O2    RETIRED 9-18 DISABILITY 1-19    Left carpal tunnel  7-19 surgery      Past Medical History:   Diagnosis Date    Arthritis     Asthma     Carpal tunnel syndrome, left     COPD (chronic obstructive pulmonary disease) (HCC)     Depression     Diabetes mellitus (HCC)     Edema leg     Hyperlipidemia     Hypertension     Hypothyroidism     Leukocytosis     Obesity     Osteoarthritis     Sleep apnea     Thyroid disease     Type 2 diabetes mellitus without complication (HCC)      No family history on file. Past Surgical History:   Procedure Laterality Date    CARPAL TUNNEL RELEASE Left 7/1/2019    LEFT CARPAL TUNNEL RELEASE performed by Letty Ramirez MD at [address] [place] [address],[address]      times 3    DILATION AND CURETTAGE OF UTERUS      HERNIA REPAIR      ROTATOR CUFF REPAIR Right     TONSILLECTOMY AND ADENOIDECTOMY      TOTAL KNEE ARTHROPLASTY Left     TUBAL LIGATION        Vitals:    02/14/20 0816   BP: 134/82   Weight: 208 lb (94.3 kg)       Objective:    Physical Exam  Vitals signs reviewed. Constitutional:       Appearance: She is well-developed. HENT:      Head: Normocephalic. Eyes:      Pupils: Pupils are equal, round, and reactive to light. Neck:      Musculoskeletal: Normal range of motion. Cardiovascular:      Rate and Rhythm: Normal rate and regular rhythm. Pulmonary:      Effort: Pulmonary effort is normal.      Breath sounds: Normal breath sounds. Abdominal:      Palpations: Abdomen is soft.    Musculoskeletal: Comments: Marked dec rom right shouler   Skin:     General: Skin is warm. Neurological:      Mental Status: She is alert and oriented to person, place, and time. Psychiatric:         Behavior: Behavior normal.         Jase Dumont was seen today for surgical consult. Diagnoses and all orders for this visit:    Essential hypertension    Pulmonary emphysema, unspecified emphysema type ([facility] [address]    Type 2 diabetes mellitus without complication, without long-term current use of insulin (HCC)    Primary osteoarthritis of right shoulder        Comments: letter form cardio and pulm  Noted  PATIENT IS CLEARED FOR SURGERY  AS GERBER GAS OK WITH CARDIOLOGY    HE CLEARED HER IN October - d iet exer  patiet  Refuses  Statin therapy  lsoe wt  exer   A great deal of time spent reviewing medications, diet, exercise, social issues. Also reviewing the chart before entering the room with patient and finishing charting after leaving patient's room. More than half of that time was spent face to face with the patient in counseling and coordinating care. ckb s qid     Ck bs  Weekly  Dc  mobic    Follow Up: Return in about 2 months (around 4/14/2020) for lab  before.      Seen by:  Davida Ramirez DO

## 2020-02-17 ENCOUNTER — ANESTHESIA EVENT (OUTPATIENT)
Dept: OPERATING ROOM | Age: 65
DRG: 483 | End: 2020-02-17
Payer: COMMERCIAL

## 2020-02-17 ENCOUNTER — PREP FOR PROCEDURE (OUTPATIENT)
Dept: ORTHOPEDIC SURGERY | Age: 65
End: 2020-02-17

## 2020-02-17 RX ORDER — CELECOXIB 200 MG/1
200 CAPSULE ORAL ONCE
Status: CANCELLED | OUTPATIENT
Start: 2020-02-17 | End: 2020-02-17

## 2020-02-17 RX ORDER — SODIUM CHLORIDE 0.9 % (FLUSH) 0.9 %
10 SYRINGE (ML) INJECTION EVERY 12 HOURS SCHEDULED
Status: CANCELLED | OUTPATIENT
Start: 2020-02-17

## 2020-02-17 RX ORDER — SODIUM CHLORIDE, SODIUM LACTATE, POTASSIUM CHLORIDE, CALCIUM CHLORIDE 600; 310; 30; 20 MG/100ML; MG/100ML; MG/100ML; MG/100ML
INJECTION, SOLUTION INTRAVENOUS CONTINUOUS
Status: CANCELLED | OUTPATIENT
Start: 2020-02-17

## 2020-02-17 RX ORDER — GABAPENTIN 100 MG/1
600 CAPSULE ORAL ONCE
Status: CANCELLED | OUTPATIENT
Start: 2020-02-17 | End: 2020-02-17

## 2020-02-17 RX ORDER — SODIUM CHLORIDE 0.9 % (FLUSH) 0.9 %
10 SYRINGE (ML) INJECTION PRN
Status: CANCELLED | OUTPATIENT
Start: 2020-02-17

## 2020-02-17 RX ORDER — ACETAMINOPHEN 325 MG/1
1000 TABLET ORAL ONCE
Status: CANCELLED | OUTPATIENT
Start: 2020-02-17 | End: 2020-02-17

## 2020-02-17 NOTE — ANESTHESIA PRE PROCEDURE
Problem List   Diagnosis Code    Essential hypertension I10    Pulmonary emphysema ([facility] [address] J43.9    Type 2 diabetes mellitus without complication, without long-term current use of insulin ([facility] [address] E11.9    Primary osteoarthritis of right shoulder M19.011       Past Medical History:        Diagnosis Date    Arthritis     Asthma     COPD (chronic obstructive pulmonary disease) ([facility] [address]     uses O2 NC 2 L continuous     Depression     Diabetes mellitus ([facility] [address]     Edema leg     Hypertension     Hypothyroidism     Leukocytosis     Obesity     Osteoarthritis     Sleep apnea     no CPAP     Thyroid disease     Type 2 diabetes mellitus without complication (HCC)        Past Surgical History:        Procedure Laterality Date    CARPAL TUNNEL RELEASE Left 2019    LEFT CARPAL TUNNEL RELEASE performed by [facility]    DILATION AND CURETTAGE OF UTERUS      HERNIA REPAIR      ROTATOR CUFF REPAIR Right     TONSILLECTOMY AND ADENOIDECTOMY      TOTAL KNEE ARTHROPLASTY Left     TUBAL LIGATION         Social History:    Social History     Tobacco Use    Smoking status: Former Smoker     Packs/day: 1.00     Years: 30.00     Pack years: 30.00     Last attempt to quit: 2017     Years since quittin.8    Smokeless tobacco: Never Used   Substance Use Topics    Alcohol use: Yes     Comment: rare                                Counseling given: Not Answered      Vital Signs (Current):   Vitals:    20 1038   BP: 130/81   Pulse: 90   Resp: 18   Temp: 98.5 °F (36.9 °C)   TempSrc: Oral   Weight: 209 lb (94.8 kg)   Height: 4' 10\" (1.473 m)                                              BP Readings from Last 3 Encounters:   20 130/81   20 134/82   01/15/20 124/84       NPO Status:  greater than 8 hours                                                                               BMI:   Wt Readings from Last 3 Encounters:   20 209 lb (94.8 kg)   02/14/20 208 lb (94.3 kg)   01/15/20 209 lb (94.8 kg)     Body mass index is 43.68 kg/m². CBC:   Lab Results   Component Value Date    WBC 20.5 08/29/2019    RBC 4.97 08/29/2019    HGB 13.9 08/29/2019    HCT 43.2 08/29/2019    MCV 86.9 08/29/2019    RDW 13.2 08/29/2019     08/29/2019       CMP:   Lab Results   Component Value Date     08/29/2019    K 4.8 08/29/2019    CL 99 08/29/2019    CO2 25 08/29/2019    BUN 22 08/29/2019    CREATININE 0.8 08/29/2019    GFRAA >60 08/29/2019    LABGLOM >60 08/29/2019    GLUCOSE 273 08/29/2019    PROT 7.9 08/29/2019    CALCIUM 9.7 08/29/2019    BILITOT <0.2 08/29/2019    ALKPHOS 93 08/29/2019    AST 12 08/29/2019    ALT 15 08/29/2019       POC Tests: No results for input(s): POCGLU, POCNA, POCK, POCCL, POCBUN, POCHEMO, POCHCT in the last 72 hours. Coags: No results found for: PROTIME, INR, APTT    HCG (If Applicable): No results found for: PREGTESTUR, PREGSERUM, HCG, HCGQUANT     ABGs: No results found for: PHART, PO2ART, RML0WRH, PEI1ZDD, BEART, Q2IWCSAA     Type & Screen (If Applicable):  No results found for: LABABO, [address]    Anesthesia Evaluation  Patient summary reviewed no history of anesthetic complications:   Airway: Mallampati: III  TM distance: <3 FB     Mouth opening: > = 3 FB Dental:          Pulmonary: breath sounds clear to auscultation  (+) COPD (on 2 liters oxygen by NC usually all the time; daily inhalers and prn nebulizer ):  sleep apnea:  asthma:     (-) recent URI                          ROS comment: Quit smoking in May 2017    Probable SAROJ based on body habitus and h/o snoring;   Never had sleep study   Cardiovascular:  Exercise tolerance: poor (<4 METS),   (+) hypertension:,     (-) past MI, CAD and CABG/stent      Rhythm: regular  Rate: normal                    Neuro/Psych:   (+) neuromuscular disease:, psychiatric history:depression/anxiety             GI/Hepatic/Renal: Neg GI/Hepatic/Renal ROS       (-) hiatal hernia, GERD, PUD,

## 2020-02-18 ENCOUNTER — HOSPITAL ENCOUNTER (OUTPATIENT)
Dept: GENERAL RADIOLOGY | Age: 65
Discharge: HOME | End: 2020-02-20
Payer: COMMERCIAL

## 2020-02-18 ENCOUNTER — HOSPITAL ENCOUNTER (OUTPATIENT)
Dept: PREADMISSION TESTING | Age: 65
Setting detail: SURGERY ADMIT
Discharge: HOME | DRG: 483 | End: 2020-02-18
Payer: COMMERCIAL

## 2020-02-18 VITALS
TEMPERATURE: 98.5 F | RESPIRATION RATE: 18 BRPM | HEIGHT: 58 IN | DIASTOLIC BLOOD PRESSURE: 81 MMHG | BODY MASS INDEX: 43.87 KG/M2 | SYSTOLIC BLOOD PRESSURE: 130 MMHG | WEIGHT: 209 LBS | HEART RATE: 90 BPM

## 2020-02-18 LAB
ANION GAP SERPL CALCULATED.3IONS-SCNC: 14 MMOL/L (ref 7–16)
BUN BLDV-MCNC: 19 MG/DL (ref 8–23)
CALCIUM SERPL-MCNC: 9.4 MG/DL (ref 8.6–10.2)
CHLORIDE BLD-SCNC: 98 MMOL/L (ref 98–107)
CO2: 25 MMOL/L (ref 22–29)
CREAT SERPL-MCNC: 0.7 MG/DL (ref 0.5–1)
GFR AFRICAN AMERICAN: >60
GFR NON-AFRICAN AMERICAN: >60 ML/MIN/1.73
GLUCOSE BLD-MCNC: 224 MG/DL (ref 74–99)
HCT VFR BLD CALC: 43 % (ref 34–48)
HEMOGLOBIN: 14.1 G/DL (ref 11.5–15.5)
MCH RBC QN AUTO: 27.4 PG (ref 26–35)
MCHC RBC AUTO-ENTMCNC: 32.8 % (ref 32–34.5)
MCV RBC AUTO: 83.7 FL (ref 80–99.9)
PDW BLD-RTO: 13.6 FL (ref 11.5–15)
PLATELET # BLD: 321 E9/L (ref 130–450)
PMV BLD AUTO: 11.9 FL (ref 7–12)
POTASSIUM SERPL-SCNC: 4.4 MMOL/L (ref 3.5–5)
RBC # BLD: 5.14 E12/L (ref 3.5–5.5)
SODIUM BLD-SCNC: 137 MMOL/L (ref 132–146)
WBC # BLD: 14.6 E9/L (ref 4.5–11.5)

## 2020-02-18 PROCEDURE — 80048 BASIC METABOLIC PNL TOTAL CA: CPT

## 2020-02-18 PROCEDURE — 71046 X-RAY EXAM CHEST 2 VIEWS: CPT

## 2020-02-18 PROCEDURE — 87081 CULTURE SCREEN ONLY: CPT

## 2020-02-18 PROCEDURE — 85027 COMPLETE CBC AUTOMATED: CPT

## 2020-02-18 PROCEDURE — 36415 COLL VENOUS BLD VENIPUNCTURE: CPT

## 2020-02-18 RX ORDER — ROPIVACAINE HYDROCHLORIDE 5 MG/ML
30 INJECTION, SOLUTION EPIDURAL; INFILTRATION; PERINEURAL ONCE
Status: CANCELLED | OUTPATIENT
Start: 2020-02-18

## 2020-02-18 RX ORDER — MIDAZOLAM HYDROCHLORIDE 1 MG/ML
1 INJECTION INTRAMUSCULAR; INTRAVENOUS PRN
Status: CANCELLED | OUTPATIENT
Start: 2020-02-18

## 2020-02-18 RX ORDER — FENTANYL CITRATE 50 UG/ML
50 INJECTION, SOLUTION INTRAMUSCULAR; INTRAVENOUS PRN
Status: CANCELLED | OUTPATIENT
Start: 2020-02-18

## 2020-02-18 RX ORDER — CELECOXIB 200 MG/1
100 CAPSULE ORAL DAILY
Status: ON HOLD | COMMUNITY
End: 2020-02-22 | Stop reason: HOSPADM

## 2020-02-18 ASSESSMENT — PAIN DESCRIPTION - PAIN TYPE: TYPE: CHRONIC PAIN

## 2020-02-18 ASSESSMENT — PAIN DESCRIPTION - ORIENTATION: ORIENTATION: RIGHT

## 2020-02-18 ASSESSMENT — PAIN SCALES - GENERAL: PAINLEVEL_OUTOF10: 7

## 2020-02-18 ASSESSMENT — PAIN DESCRIPTION - LOCATION: LOCATION: SHOULDER

## 2020-02-18 NOTE — PROGRESS NOTES
[facility] PRE-ADMISSION TESTING INSTRUCTIONS    The Preadmission Testing patient is instructed accordingly using the following criteria (check applicable):    ARRIVAL INSTRUCTIONS:  [x] Parking the day of Surgery is located in the Main Entrance lot. Upon entering the door, make an immediate right to the surgery reception desk    [] [phone] is available Monday through Friday 6 am to 6 pm    [x] Bring photo ID and insurance card    [] Bring in a copy of Living will or Durable Power of  papers. [x] Please be sure to arrange for responsible adult to provide transportation to and from the hospital    [x] Please arrange for responsible adult to be with you for the 24 hour period post procedure due to having anesthesia      GENERAL INSTRUCTIONS:    [x] Nothing by mouth after midnight, including gum, candy, mints or water    [x] You may brush your teeth, but do not swallow any water    [x] Take medications as instructed with 1-2 oz of water    [x] Stop herbal supplements and vitamins 5 days prior to procedure    [x] Follow preop dosing of blood thinners per physician instructions    [] Take 1/2 dose of evening insulin, but no insulin after midnight    [x] No oral diabetic medications after midnight    [x] If diabetic and have low blood sugar or feel symptomatic, take 1-2oz apple juice only    [x] Bring inhalers day of surgery    [] Bring C-PAP/ Bi-Pap day of surgery    [] Bring urine specimen day of surgery    [x] Shower or bath with soap, lather and rinse well, AM of Surgery, no lotion, powders or creams to surgical site    [] Follow bowel prep as instructed per surgeon    [x] No tobacco products within 24 hours of surgery     [x] No alcohol or illegal drug use within 24 hours of surgery.     [x] Jewelry, body piercing's, eyeglasses, contact lenses and dentures are not permitted into surgery (bring cases)      [x] Please do not wear any nail polish, make up or hair products on the day of surgery    [x] If not already done, you can expect a call from registration    [x] You can expect a call the business day prior to procedure to notify you if your arrival time changes    [x] If you receive a survey after surgery we would greatly appreciate your comments    [] Parent/guardian of a minor must accompany their child and remain on the premises  the entire time they are under our care     [] Pediatric patients may bring favorite toy, blanket or comfort item with them    [] A caregiver or family member must remain with the patient during their stay if they are mentally handicapped, have dementia, disoriented or unable to use a call light or would be a safety concern if left unattended    [x] Please notify surgeon if you develop any illness between now and time of surgery (cold, cough, sore throat, fever, nausea, vomiting) or any signs of infections  including skin, wounds, and dental.    [x]  The Outpatient Pharmacy is available to fill your prescription here on your day of surgery, ask your preop nurse for details    [x] Other instructions  EDUCATIONAL MATERIALS PROVIDED:    [x] PAT Preoperative Education Packet/Booklet     [x] Medication List    [x] Fluoroscopy Information Pamphlet    [] Transfusion bracelet applied with instructions    [] Joint replacement video reviewed    [x] Shower with soap, lather and rinse well, and use CHG wipes provided the evening before surgery as instructed

## 2020-02-20 LAB — ORGANISM: ABNORMAL

## 2020-02-20 NOTE — PROGRESS NOTES
ML at Dr. Selene Ferreira office regarding +MRSA results 2-20-20, results faxed to Dr. Selene Ferreira office. Dr. Selene Ferreira office returned call- to start patient on treatment as of 2-20-20.

## 2020-02-21 ENCOUNTER — APPOINTMENT (OUTPATIENT)
Dept: GENERAL RADIOLOGY | Age: 65
DRG: 483 | End: 2020-02-21
Attending: ORTHOPAEDIC SURGERY
Payer: COMMERCIAL

## 2020-02-21 ENCOUNTER — HOSPITAL ENCOUNTER (INPATIENT)
Age: 65
LOS: 1 days | Discharge: HOME | DRG: 483 | End: 2020-02-22
Attending: ORTHOPAEDIC SURGERY | Admitting: ORTHOPAEDIC SURGERY
Payer: COMMERCIAL

## 2020-02-21 ENCOUNTER — ANESTHESIA (OUTPATIENT)
Dept: OPERATING ROOM | Age: 65
DRG: 483 | End: 2020-02-21
Payer: COMMERCIAL

## 2020-02-21 VITALS — TEMPERATURE: 97.3 F | DIASTOLIC BLOOD PRESSURE: 69 MMHG | OXYGEN SATURATION: 100 % | SYSTOLIC BLOOD PRESSURE: 115 MMHG

## 2020-02-21 DIAGNOSIS — Z96.611 S/P REVERSE TOTAL SHOULDER ARTHROPLASTY, RIGHT: ICD-10-CM

## 2020-02-21 DIAGNOSIS — G89.18 POST-OP PAIN: Primary | ICD-10-CM

## 2020-02-21 PROBLEM — I10 ESSENTIAL HYPERTENSION: Chronic | Status: ACTIVE | Noted: 2019-08-30

## 2020-02-21 PROBLEM — E11.9 TYPE 2 DIABETES MELLITUS WITHOUT COMPLICATION, WITHOUT LONG-TERM CURRENT USE OF INSULIN (HCC): Chronic | Status: ACTIVE | Noted: 2019-08-30

## 2020-02-21 PROBLEM — J43.9 PULMONARY EMPHYSEMA (HCC): Chronic | Status: ACTIVE | Noted: 2019-08-30

## 2020-02-21 LAB
METER GLUCOSE: 215 MG/DL (ref 74–99)
METER GLUCOSE: 225 MG/DL (ref 74–99)
METER GLUCOSE: 238 MG/DL (ref 74–99)

## 2020-02-21 PROCEDURE — 6360000002 HC RX W HCPCS: Performed by: PHYSICIAN ASSISTANT

## 2020-02-21 PROCEDURE — 2580000003 HC RX 258: Performed by: NURSE ANESTHETIST, CERTIFIED REGISTERED

## 2020-02-21 PROCEDURE — 7100000001 HC PACU RECOVERY - ADDTL 15 MIN: Performed by: ORTHOPAEDIC SURGERY

## 2020-02-21 PROCEDURE — 2580000003 HC RX 258: Performed by: PHYSICIAN ASSISTANT

## 2020-02-21 PROCEDURE — 7100000000 HC PACU RECOVERY - FIRST 15 MIN: Performed by: ORTHOPAEDIC SURGERY

## 2020-02-21 PROCEDURE — 6370000000 HC RX 637 (ALT 250 FOR IP): Performed by: NURSE ANESTHETIST, CERTIFIED REGISTERED

## 2020-02-21 PROCEDURE — 2580000003 HC RX 258: Performed by: ORTHOPAEDIC SURGERY

## 2020-02-21 PROCEDURE — 6370000000 HC RX 637 (ALT 250 FOR IP): Performed by: INTERNAL MEDICINE

## 2020-02-21 PROCEDURE — 99242 OFF/OP CONSLTJ NEW/EST SF 20: CPT | Performed by: INTERNAL MEDICINE

## 2020-02-21 PROCEDURE — 6370000000 HC RX 637 (ALT 250 FOR IP): Performed by: ORTHOPAEDIC SURGERY

## 2020-02-21 PROCEDURE — 6360000002 HC RX W HCPCS: Performed by: ORTHOPAEDIC SURGERY

## 2020-02-21 PROCEDURE — 88304 TISSUE EXAM BY PATHOLOGIST: CPT

## 2020-02-21 PROCEDURE — 2500000003 HC RX 250 WO HCPCS: Performed by: ORTHOPAEDIC SURGERY

## 2020-02-21 PROCEDURE — 2500000003 HC RX 250 WO HCPCS: Performed by: PHYSICIAN ASSISTANT

## 2020-02-21 PROCEDURE — 88311 DECALCIFY TISSUE: CPT

## 2020-02-21 PROCEDURE — G0378 HOSPITAL OBSERVATION PER HR: HCPCS

## 2020-02-21 PROCEDURE — 6360000002 HC RX W HCPCS: Performed by: ANESTHESIOLOGY

## 2020-02-21 PROCEDURE — 3600000015 HC SURGERY LEVEL 5 ADDTL 15MIN: Performed by: ORTHOPAEDIC SURGERY

## 2020-02-21 PROCEDURE — 3700000001 HC ADD 15 MINUTES (ANESTHESIA): Performed by: ORTHOPAEDIC SURGERY

## 2020-02-21 PROCEDURE — 82962 GLUCOSE BLOOD TEST: CPT

## 2020-02-21 PROCEDURE — 3600000005 HC SURGERY LEVEL 5 BASE: Performed by: ORTHOPAEDIC SURGERY

## 2020-02-21 PROCEDURE — 1200000000 HC SEMI PRIVATE

## 2020-02-21 PROCEDURE — 3700000000 HC ANESTHESIA ATTENDED CARE: Performed by: ORTHOPAEDIC SURGERY

## 2020-02-21 PROCEDURE — 76942 ECHO GUIDE FOR BIOPSY: CPT | Performed by: ANESTHESIOLOGY

## 2020-02-21 PROCEDURE — 6360000002 HC RX W HCPCS: Performed by: NURSE ANESTHETIST, CERTIFIED REGISTERED

## 2020-02-21 PROCEDURE — 2500000003 HC RX 250 WO HCPCS: Performed by: NURSE ANESTHETIST, CERTIFIED REGISTERED

## 2020-02-21 PROCEDURE — 2709999900 HC NON-CHARGEABLE SUPPLY: Performed by: ORTHOPAEDIC SURGERY

## 2020-02-21 PROCEDURE — 0RRJ00Z REPLACEMENT OF RIGHT SHOULDER JOINT WITH REVERSE BALL AND SOCKET SYNTHETIC SUBSTITUTE, OPEN APPROACH: ICD-10-PCS | Performed by: ORTHOPAEDIC SURGERY

## 2020-02-21 PROCEDURE — 2720000010 HC SURG SUPPLY STERILE: Performed by: ORTHOPAEDIC SURGERY

## 2020-02-21 PROCEDURE — 73020 X-RAY EXAM OF SHOULDER: CPT

## 2020-02-21 PROCEDURE — C1776 JOINT DEVICE (IMPLANTABLE): HCPCS | Performed by: ORTHOPAEDIC SURGERY

## 2020-02-21 PROCEDURE — 6370000000 HC RX 637 (ALT 250 FOR IP): Performed by: PHYSICIAN ASSISTANT

## 2020-02-21 DEVICE — SCREW, LOCKING BONE, RSP, 5X26
Type: IMPLANTABLE DEVICE | Site: SHOULDER | Status: FUNCTIONAL
Brand: DJO SURGICAL

## 2020-02-21 DEVICE — SCREW, LOCKING BONE, RSP, 5X30
Type: IMPLANTABLE DEVICE | Site: SHOULDER | Status: FUNCTIONAL
Brand: DJO SURGICAL

## 2020-02-21 DEVICE — GLENOID, HEAD W/RETAINING SCREW, RSP, 32MM/-4MM
Type: IMPLANTABLE DEVICE | Site: SHOULDER | Status: FUNCTIONAL
Brand: DJO SURGICAL

## 2020-02-21 DEVICE — Z DUP USE 2663688 INSERT HUM 32MM NEUT SM SOCK E PLUS ALTIVATE REV: Type: IMPLANTABLE DEVICE | Site: SHOULDER | Status: FUNCTIONAL

## 2020-02-21 DEVICE — Z INACTIVE BASEPLATE GLEN DIA30MM REV P2 COAT RSP: Type: IMPLANTABLE DEVICE | Site: SHOULDER | Status: FUNCTIONAL

## 2020-02-21 DEVICE — IMPLANTABLE DEVICE: Type: IMPLANTABLE DEVICE | Site: SHOULDER | Status: FUNCTIONAL

## 2020-02-21 DEVICE — SCREW, LOCKING BONE, RSP, 5X14
Type: IMPLANTABLE DEVICE | Site: SHOULDER | Status: FUNCTIONAL
Brand: DJO SURGICAL

## 2020-02-21 RX ORDER — FENTANYL CITRATE 50 UG/ML
INJECTION, SOLUTION INTRAMUSCULAR; INTRAVENOUS PRN
Status: DISCONTINUED | OUTPATIENT
Start: 2020-02-21 | End: 2020-02-21 | Stop reason: SDUPTHER

## 2020-02-21 RX ORDER — CITALOPRAM 20 MG/1
40 TABLET ORAL DAILY
Status: DISCONTINUED | OUTPATIENT
Start: 2020-02-22 | End: 2020-02-22 | Stop reason: HOSPADM

## 2020-02-21 RX ORDER — ONDANSETRON 2 MG/ML
INJECTION INTRAMUSCULAR; INTRAVENOUS PRN
Status: DISCONTINUED | OUTPATIENT
Start: 2020-02-21 | End: 2020-02-21 | Stop reason: SDUPTHER

## 2020-02-21 RX ORDER — NEOSTIGMINE METHYLSULFATE 1 MG/ML
INJECTION, SOLUTION INTRAVENOUS PRN
Status: DISCONTINUED | OUTPATIENT
Start: 2020-02-21 | End: 2020-02-21 | Stop reason: SDUPTHER

## 2020-02-21 RX ORDER — MEPERIDINE HYDROCHLORIDE 25 MG/ML
12.5 INJECTION INTRAMUSCULAR; INTRAVENOUS; SUBCUTANEOUS EVERY 5 MIN PRN
Status: DISCONTINUED | OUTPATIENT
Start: 2020-02-21 | End: 2020-02-21 | Stop reason: HOSPADM

## 2020-02-21 RX ORDER — ONDANSETRON 2 MG/ML
4 INJECTION INTRAMUSCULAR; INTRAVENOUS EVERY 6 HOURS PRN
Status: DISCONTINUED | OUTPATIENT
Start: 2020-02-21 | End: 2020-02-22 | Stop reason: HOSPADM

## 2020-02-21 RX ORDER — CELECOXIB 100 MG/1
200 CAPSULE ORAL ONCE
Status: COMPLETED | OUTPATIENT
Start: 2020-02-21 | End: 2020-02-21

## 2020-02-21 RX ORDER — ROCURONIUM BROMIDE 10 MG/ML
INJECTION, SOLUTION INTRAVENOUS PRN
Status: DISCONTINUED | OUTPATIENT
Start: 2020-02-21 | End: 2020-02-21 | Stop reason: SDUPTHER

## 2020-02-21 RX ORDER — ACETAMINOPHEN 325 MG/1
650 TABLET ORAL EVERY 6 HOURS
Status: DISCONTINUED | OUTPATIENT
Start: 2020-02-21 | End: 2020-02-22 | Stop reason: HOSPADM

## 2020-02-21 RX ORDER — MIDAZOLAM HYDROCHLORIDE 1 MG/ML
1 INJECTION INTRAMUSCULAR; INTRAVENOUS PRN
Status: DISCONTINUED | OUTPATIENT
Start: 2020-02-21 | End: 2020-02-21 | Stop reason: HOSPADM

## 2020-02-21 RX ORDER — FENTANYL CITRATE 50 UG/ML
50 INJECTION, SOLUTION INTRAMUSCULAR; INTRAVENOUS PRN
Status: DISCONTINUED | OUTPATIENT
Start: 2020-02-21 | End: 2020-02-21 | Stop reason: HOSPADM

## 2020-02-21 RX ORDER — PROMETHAZINE HYDROCHLORIDE 25 MG/ML
6.25 INJECTION, SOLUTION INTRAMUSCULAR; INTRAVENOUS
Status: DISCONTINUED | OUTPATIENT
Start: 2020-02-21 | End: 2020-02-21 | Stop reason: HOSPADM

## 2020-02-21 RX ORDER — FENTANYL CITRATE 50 UG/ML
25 INJECTION, SOLUTION INTRAMUSCULAR; INTRAVENOUS EVERY 5 MIN PRN
Status: DISCONTINUED | OUTPATIENT
Start: 2020-02-21 | End: 2020-02-21 | Stop reason: HOSPADM

## 2020-02-21 RX ORDER — GABAPENTIN 100 MG/1
100 CAPSULE ORAL NIGHTLY
Status: DISCONTINUED | OUTPATIENT
Start: 2020-02-21 | End: 2020-02-22 | Stop reason: HOSPADM

## 2020-02-21 RX ORDER — FENTANYL CITRATE 50 UG/ML
50 INJECTION, SOLUTION INTRAMUSCULAR; INTRAVENOUS EVERY 5 MIN PRN
Status: DISCONTINUED | OUTPATIENT
Start: 2020-02-21 | End: 2020-02-21 | Stop reason: HOSPADM

## 2020-02-21 RX ORDER — SODIUM CHLORIDE 0.9 % (FLUSH) 0.9 %
10 SYRINGE (ML) INJECTION PRN
Status: DISCONTINUED | OUTPATIENT
Start: 2020-02-21 | End: 2020-02-22 | Stop reason: HOSPADM

## 2020-02-21 RX ORDER — LIDOCAINE HYDROCHLORIDE 20 MG/ML
INJECTION, SOLUTION EPIDURAL; INFILTRATION; INTRACAUDAL; PERINEURAL PRN
Status: DISCONTINUED | OUTPATIENT
Start: 2020-02-21 | End: 2020-02-21 | Stop reason: SDUPTHER

## 2020-02-21 RX ORDER — ALBUTEROL SULFATE 90 UG/1
2 AEROSOL, METERED RESPIRATORY (INHALATION) EVERY 4 HOURS PRN
Status: DISCONTINUED | OUTPATIENT
Start: 2020-02-21 | End: 2020-02-21 | Stop reason: CLARIF

## 2020-02-21 RX ORDER — DIPHENHYDRAMINE HYDROCHLORIDE 50 MG/ML
12.5 INJECTION INTRAMUSCULAR; INTRAVENOUS
Status: DISCONTINUED | OUTPATIENT
Start: 2020-02-21 | End: 2020-02-21 | Stop reason: HOSPADM

## 2020-02-21 RX ORDER — DEXAMETHASONE SODIUM PHOSPHATE 10 MG/ML
8 INJECTION, SOLUTION INTRAMUSCULAR; INTRAVENOUS ONCE
Status: DISCONTINUED | OUTPATIENT
Start: 2020-02-21 | End: 2020-02-21 | Stop reason: HOSPADM

## 2020-02-21 RX ORDER — ONDANSETRON 4 MG/1
4 TABLET, ORALLY DISINTEGRATING ORAL EVERY 8 HOURS PRN
Status: DISCONTINUED | OUTPATIENT
Start: 2020-02-21 | End: 2020-02-22 | Stop reason: HOSPADM

## 2020-02-21 RX ORDER — DEXTROSE MONOHYDRATE 25 G/50ML
12.5 INJECTION, SOLUTION INTRAVENOUS PRN
Status: DISCONTINUED | OUTPATIENT
Start: 2020-02-21 | End: 2020-02-22 | Stop reason: HOSPADM

## 2020-02-21 RX ORDER — SODIUM CHLORIDE, SODIUM LACTATE, POTASSIUM CHLORIDE, CALCIUM CHLORIDE 600; 310; 30; 20 MG/100ML; MG/100ML; MG/100ML; MG/100ML
INJECTION, SOLUTION INTRAVENOUS CONTINUOUS
Status: DISCONTINUED | OUTPATIENT
Start: 2020-02-21 | End: 2020-02-22 | Stop reason: HOSPADM

## 2020-02-21 RX ORDER — CEFAZOLIN SODIUM 2 G/50ML
2 SOLUTION INTRAVENOUS
Status: COMPLETED | OUTPATIENT
Start: 2020-02-21 | End: 2020-02-21

## 2020-02-21 RX ORDER — SODIUM CHLORIDE 0.9 % (FLUSH) 0.9 %
10 SYRINGE (ML) INJECTION EVERY 12 HOURS SCHEDULED
Status: DISCONTINUED | OUTPATIENT
Start: 2020-02-21 | End: 2020-02-21 | Stop reason: HOSPADM

## 2020-02-21 RX ORDER — CEFAZOLIN SODIUM 2 G/50ML
2 SOLUTION INTRAVENOUS EVERY 8 HOURS
Status: COMPLETED | OUTPATIENT
Start: 2020-02-21 | End: 2020-02-22

## 2020-02-21 RX ORDER — ACETAMINOPHEN 500 MG
1000 TABLET ORAL ONCE
Status: COMPLETED | OUTPATIENT
Start: 2020-02-21 | End: 2020-02-21

## 2020-02-21 RX ORDER — CELECOXIB 100 MG/1
100 CAPSULE ORAL DAILY
Status: DISCONTINUED | OUTPATIENT
Start: 2020-02-21 | End: 2020-02-22 | Stop reason: HOSPADM

## 2020-02-21 RX ORDER — LEVOTHYROXINE SODIUM 0.05 MG/1
50 TABLET ORAL DAILY
Status: DISCONTINUED | OUTPATIENT
Start: 2020-02-22 | End: 2020-02-22 | Stop reason: HOSPADM

## 2020-02-21 RX ORDER — GLIMEPIRIDE 2 MG/1
1 TABLET ORAL
Status: DISCONTINUED | OUTPATIENT
Start: 2020-02-22 | End: 2020-02-22 | Stop reason: HOSPADM

## 2020-02-21 RX ORDER — BUDESONIDE 0.25 MG/2ML
250 INHALANT ORAL 2 TIMES DAILY
Status: DISCONTINUED | OUTPATIENT
Start: 2020-02-21 | End: 2020-02-22 | Stop reason: HOSPADM

## 2020-02-21 RX ORDER — SODIUM CHLORIDE 9 MG/ML
INJECTION, SOLUTION INTRAVENOUS CONTINUOUS PRN
Status: DISCONTINUED | OUTPATIENT
Start: 2020-02-21 | End: 2020-02-21 | Stop reason: SDUPTHER

## 2020-02-21 RX ORDER — VANCOMYCIN HYDROCHLORIDE 500 MG/10ML
INJECTION, POWDER, LYOPHILIZED, FOR SOLUTION INTRAVENOUS PRN
Status: DISCONTINUED | OUTPATIENT
Start: 2020-02-21 | End: 2020-02-21 | Stop reason: ALTCHOICE

## 2020-02-21 RX ORDER — GLYCOPYRROLATE 1 MG/5 ML
SYRINGE (ML) INTRAVENOUS PRN
Status: DISCONTINUED | OUTPATIENT
Start: 2020-02-21 | End: 2020-02-21 | Stop reason: SDUPTHER

## 2020-02-21 RX ORDER — OXYCODONE HYDROCHLORIDE 5 MG/1
5 TABLET ORAL EVERY 4 HOURS PRN
Status: DISCONTINUED | OUTPATIENT
Start: 2020-02-21 | End: 2020-02-22 | Stop reason: HOSPADM

## 2020-02-21 RX ORDER — SUCCINYLCHOLINE CHLORIDE 20 MG/ML
INJECTION INTRAMUSCULAR; INTRAVENOUS PRN
Status: DISCONTINUED | OUTPATIENT
Start: 2020-02-21 | End: 2020-02-21 | Stop reason: SDUPTHER

## 2020-02-21 RX ORDER — OXYCODONE HYDROCHLORIDE AND ACETAMINOPHEN 5; 325 MG/1; MG/1
1 TABLET ORAL
Status: DISCONTINUED | OUTPATIENT
Start: 2020-02-21 | End: 2020-02-21 | Stop reason: HOSPADM

## 2020-02-21 RX ORDER — GABAPENTIN 300 MG/1
600 CAPSULE ORAL ONCE
Status: COMPLETED | OUTPATIENT
Start: 2020-02-21 | End: 2020-02-21

## 2020-02-21 RX ORDER — SODIUM CHLORIDE, SODIUM LACTATE, POTASSIUM CHLORIDE, CALCIUM CHLORIDE 600; 310; 30; 20 MG/100ML; MG/100ML; MG/100ML; MG/100ML
INJECTION, SOLUTION INTRAVENOUS CONTINUOUS
Status: DISCONTINUED | OUTPATIENT
Start: 2020-02-21 | End: 2020-02-21

## 2020-02-21 RX ORDER — IPRATROPIUM BROMIDE AND ALBUTEROL SULFATE 2.5; .5 MG/3ML; MG/3ML
1 SOLUTION RESPIRATORY (INHALATION) EVERY 4 HOURS PRN
Status: DISCONTINUED | OUTPATIENT
Start: 2020-02-21 | End: 2020-02-21 | Stop reason: SDUPTHER

## 2020-02-21 RX ORDER — ALBUTEROL SULFATE 2.5 MG/3ML
2.5 SOLUTION RESPIRATORY (INHALATION) EVERY 4 HOURS PRN
Status: DISCONTINUED | OUTPATIENT
Start: 2020-02-21 | End: 2020-02-22 | Stop reason: HOSPADM

## 2020-02-21 RX ORDER — SODIUM CHLORIDE 0.9 % (FLUSH) 0.9 %
10 SYRINGE (ML) INJECTION PRN
Status: DISCONTINUED | OUTPATIENT
Start: 2020-02-21 | End: 2020-02-21 | Stop reason: HOSPADM

## 2020-02-21 RX ORDER — HYDROCHLOROTHIAZIDE 12.5 MG/1
12.5 TABLET ORAL DAILY
Status: DISCONTINUED | OUTPATIENT
Start: 2020-02-22 | End: 2020-02-22 | Stop reason: HOSPADM

## 2020-02-21 RX ORDER — SENNA AND DOCUSATE SODIUM 50; 8.6 MG/1; MG/1
1 TABLET, FILM COATED ORAL 2 TIMES DAILY
Status: DISCONTINUED | OUTPATIENT
Start: 2020-02-21 | End: 2020-02-22 | Stop reason: HOSPADM

## 2020-02-21 RX ORDER — PROPOFOL 10 MG/ML
INJECTION, EMULSION INTRAVENOUS PRN
Status: DISCONTINUED | OUTPATIENT
Start: 2020-02-21 | End: 2020-02-21 | Stop reason: SDUPTHER

## 2020-02-21 RX ORDER — OXYCODONE HYDROCHLORIDE 5 MG/1
10 TABLET ORAL EVERY 4 HOURS PRN
Status: DISCONTINUED | OUTPATIENT
Start: 2020-02-21 | End: 2020-02-22 | Stop reason: HOSPADM

## 2020-02-21 RX ORDER — ARFORMOTEROL TARTRATE 15 UG/2ML
15 SOLUTION RESPIRATORY (INHALATION) 2 TIMES DAILY
Status: DISCONTINUED | OUTPATIENT
Start: 2020-02-21 | End: 2020-02-22 | Stop reason: HOSPADM

## 2020-02-21 RX ORDER — NICOTINE POLACRILEX 4 MG
15 LOZENGE BUCCAL PRN
Status: DISCONTINUED | OUTPATIENT
Start: 2020-02-21 | End: 2020-02-22 | Stop reason: HOSPADM

## 2020-02-21 RX ORDER — SODIUM CHLORIDE 0.9 % (FLUSH) 0.9 %
10 SYRINGE (ML) INJECTION EVERY 12 HOURS SCHEDULED
Status: DISCONTINUED | OUTPATIENT
Start: 2020-02-21 | End: 2020-02-22 | Stop reason: HOSPADM

## 2020-02-21 RX ORDER — ROPIVACAINE HYDROCHLORIDE 5 MG/ML
30 INJECTION, SOLUTION EPIDURAL; INFILTRATION; PERINEURAL ONCE
Status: COMPLETED | OUTPATIENT
Start: 2020-02-21 | End: 2020-02-21

## 2020-02-21 RX ORDER — DEXTROSE MONOHYDRATE 50 MG/ML
100 INJECTION, SOLUTION INTRAVENOUS PRN
Status: DISCONTINUED | OUTPATIENT
Start: 2020-02-21 | End: 2020-02-22 | Stop reason: HOSPADM

## 2020-02-21 RX ORDER — ALBUTEROL SULFATE 90 UG/1
AEROSOL, METERED RESPIRATORY (INHALATION) PRN
Status: DISCONTINUED | OUTPATIENT
Start: 2020-02-21 | End: 2020-02-21 | Stop reason: SDUPTHER

## 2020-02-21 RX ADMIN — ACETAMINOPHEN 1000 MG: 500 TABLET ORAL at 10:55

## 2020-02-21 RX ADMIN — PHENYLEPHRINE HYDROCHLORIDE 50 MCG: 10 INJECTION INTRAVENOUS at 15:43

## 2020-02-21 RX ADMIN — ROCURONIUM BROMIDE 20 MG: 10 SOLUTION INTRAVENOUS at 15:52

## 2020-02-21 RX ADMIN — ASPIRIN 325 MG: 325 TABLET, DELAYED RELEASE ORAL at 20:23

## 2020-02-21 RX ADMIN — CEFAZOLIN SODIUM 2 G: 2 SOLUTION INTRAVENOUS at 15:25

## 2020-02-21 RX ADMIN — OXYCODONE HYDROCHLORIDE 10 MG: 5 TABLET ORAL at 20:23

## 2020-02-21 RX ADMIN — TRANEXAMIC ACID 1000 MG: 100 INJECTION, SOLUTION INTRAVENOUS at 18:46

## 2020-02-21 RX ADMIN — PHENYLEPHRINE HYDROCHLORIDE 50 MCG: 10 INJECTION INTRAVENOUS at 16:43

## 2020-02-21 RX ADMIN — SODIUM CHLORIDE: 9 INJECTION, SOLUTION INTRAVENOUS at 15:00

## 2020-02-21 RX ADMIN — PHENYLEPHRINE HYDROCHLORIDE 50 MCG: 10 INJECTION INTRAVENOUS at 16:48

## 2020-02-21 RX ADMIN — MUPIROCIN: 20 OINTMENT TOPICAL at 21:06

## 2020-02-21 RX ADMIN — CELECOXIB 200 MG: 100 CAPSULE ORAL at 10:56

## 2020-02-21 RX ADMIN — FENTANYL CITRATE 50 MCG: 50 INJECTION, SOLUTION INTRAMUSCULAR; INTRAVENOUS at 18:40

## 2020-02-21 RX ADMIN — Medication 3 MG: at 17:45

## 2020-02-21 RX ADMIN — VANCOMYCIN HYDROCHLORIDE 1.5 G: 10 INJECTION, POWDER, LYOPHILIZED, FOR SOLUTION INTRAVENOUS at 11:56

## 2020-02-21 RX ADMIN — ALBUTEROL SULFATE 4 PUFF: 90 AEROSOL, METERED RESPIRATORY (INHALATION) at 17:56

## 2020-02-21 RX ADMIN — HYDROMORPHONE HYDROCHLORIDE 0.5 MG: 1 INJECTION, SOLUTION INTRAMUSCULAR; INTRAVENOUS; SUBCUTANEOUS at 23:45

## 2020-02-21 RX ADMIN — ACETAMINOPHEN 650 MG: 325 TABLET ORAL at 20:23

## 2020-02-21 RX ADMIN — SODIUM CHLORIDE, POTASSIUM CHLORIDE, SODIUM LACTATE AND CALCIUM CHLORIDE: 600; 310; 30; 20 INJECTION, SOLUTION INTRAVENOUS at 21:06

## 2020-02-21 RX ADMIN — SUCCINYLCHOLINE CHLORIDE 140 MG: 20 INJECTION, SOLUTION INTRAMUSCULAR; INTRAVENOUS at 15:23

## 2020-02-21 RX ADMIN — ONDANSETRON HYDROCHLORIDE 4 MG: 2 INJECTION, SOLUTION INTRAMUSCULAR; INTRAVENOUS at 17:28

## 2020-02-21 RX ADMIN — SUGAMMADEX 200 MG: 100 INJECTION, SOLUTION INTRAVENOUS at 18:05

## 2020-02-21 RX ADMIN — PHENYLEPHRINE HYDROCHLORIDE 100 MCG: 10 INJECTION INTRAVENOUS at 17:19

## 2020-02-21 RX ADMIN — FENTANYL CITRATE 50 MCG: 50 INJECTION, SOLUTION INTRAMUSCULAR; INTRAVENOUS at 17:39

## 2020-02-21 RX ADMIN — ROCURONIUM BROMIDE 10 MG: 10 SOLUTION INTRAVENOUS at 16:37

## 2020-02-21 RX ADMIN — CELECOXIB 100 MG: 100 CAPSULE ORAL at 20:23

## 2020-02-21 RX ADMIN — PHENYLEPHRINE HYDROCHLORIDE 50 MCG: 10 INJECTION INTRAVENOUS at 17:03

## 2020-02-21 RX ADMIN — GABAPENTIN 600 MG: 300 CAPSULE ORAL at 10:56

## 2020-02-21 RX ADMIN — PHENYLEPHRINE HYDROCHLORIDE 100 MCG: 10 INJECTION INTRAVENOUS at 17:08

## 2020-02-21 RX ADMIN — ROCURONIUM BROMIDE 20 MG: 10 SOLUTION INTRAVENOUS at 16:14

## 2020-02-21 RX ADMIN — PHENYLEPHRINE HYDROCHLORIDE 50 MCG: 10 INJECTION INTRAVENOUS at 16:24

## 2020-02-21 RX ADMIN — PROPOFOL 160 MG: 10 INJECTION, EMULSION INTRAVENOUS at 15:22

## 2020-02-21 RX ADMIN — MIDAZOLAM HYDROCHLORIDE 1 MG: 1 INJECTION, SOLUTION INTRAMUSCULAR; INTRAVENOUS at 12:37

## 2020-02-21 RX ADMIN — FENTANYL CITRATE 50 MCG: 50 INJECTION, SOLUTION INTRAMUSCULAR; INTRAVENOUS at 15:22

## 2020-02-21 RX ADMIN — FENTANYL CITRATE 25 MCG: 50 INJECTION, SOLUTION INTRAMUSCULAR; INTRAVENOUS at 12:37

## 2020-02-21 RX ADMIN — INSULIN LISPRO 2 UNITS: 100 INJECTION, SOLUTION INTRAVENOUS; SUBCUTANEOUS at 23:28

## 2020-02-21 RX ADMIN — SODIUM CHLORIDE: 9 INJECTION, SOLUTION INTRAVENOUS at 16:17

## 2020-02-21 RX ADMIN — GABAPENTIN 100 MG: 100 CAPSULE ORAL at 20:23

## 2020-02-21 RX ADMIN — SENNOSIDES AND DOCUSATE SODIUM 1 TABLET: 8.6; 5 TABLET ORAL at 21:05

## 2020-02-21 RX ADMIN — PHENYLEPHRINE HYDROCHLORIDE 50 MCG: 10 INJECTION INTRAVENOUS at 16:12

## 2020-02-21 RX ADMIN — ROCURONIUM BROMIDE 10 MG: 10 SOLUTION INTRAVENOUS at 16:58

## 2020-02-21 RX ADMIN — TRANEXAMIC ACID 1000 MG: 1 INJECTION, SOLUTION INTRAVENOUS at 15:35

## 2020-02-21 RX ADMIN — Medication 0.6 MG: at 17:45

## 2020-02-21 RX ADMIN — ROCURONIUM BROMIDE 40 MG: 10 SOLUTION INTRAVENOUS at 15:35

## 2020-02-21 RX ADMIN — PHENYLEPHRINE HYDROCHLORIDE 100 MCG: 10 INJECTION INTRAVENOUS at 16:33

## 2020-02-21 RX ADMIN — LIDOCAINE HYDROCHLORIDE 80 MG: 20 INJECTION, SOLUTION EPIDURAL; INFILTRATION; INTRACAUDAL; PERINEURAL at 15:22

## 2020-02-21 RX ADMIN — ROPIVACAINE HYDROCHLORIDE 30 ML: 5 INJECTION, SOLUTION EPIDURAL; INFILTRATION; PERINEURAL at 12:50

## 2020-02-21 RX ADMIN — CEFAZOLIN SODIUM 2 G: 2 SOLUTION INTRAVENOUS at 23:26

## 2020-02-21 ASSESSMENT — PULMONARY FUNCTION TESTS
PIF_VALUE: 27
PIF_VALUE: 28
PIF_VALUE: 27
PIF_VALUE: 28
PIF_VALUE: 31
PIF_VALUE: 28
PIF_VALUE: 26
PIF_VALUE: 12
PIF_VALUE: 24
PIF_VALUE: 28
PIF_VALUE: 27
PIF_VALUE: 28
PIF_VALUE: 29
PIF_VALUE: 27
PIF_VALUE: 28
PIF_VALUE: 36
PIF_VALUE: 22
PIF_VALUE: 28
PIF_VALUE: 28
PIF_VALUE: 20
PIF_VALUE: 1
PIF_VALUE: 28
PIF_VALUE: 4
PIF_VALUE: 28
PIF_VALUE: 22
PIF_VALUE: 27
PIF_VALUE: 28
PIF_VALUE: 24
PIF_VALUE: 23
PIF_VALUE: 26
PIF_VALUE: 26
PIF_VALUE: 1
PIF_VALUE: 10
PIF_VALUE: 23
PIF_VALUE: 28
PIF_VALUE: 29
PIF_VALUE: 23
PIF_VALUE: 28
PIF_VALUE: 29
PIF_VALUE: 27
PIF_VALUE: 28
PIF_VALUE: 28
PIF_VALUE: 3
PIF_VALUE: 4
PIF_VALUE: 28
PIF_VALUE: 29
PIF_VALUE: 28
PIF_VALUE: 21
PIF_VALUE: 27
PIF_VALUE: 28
PIF_VALUE: 28
PIF_VALUE: 29
PIF_VALUE: 28
PIF_VALUE: 28
PIF_VALUE: 24
PIF_VALUE: 23
PIF_VALUE: 28
PIF_VALUE: 26
PIF_VALUE: 28
PIF_VALUE: 28
PIF_VALUE: 27
PIF_VALUE: 2
PIF_VALUE: 23
PIF_VALUE: 1
PIF_VALUE: 24
PIF_VALUE: 28
PIF_VALUE: 28
PIF_VALUE: 29
PIF_VALUE: 28
PIF_VALUE: 28
PIF_VALUE: 23
PIF_VALUE: 27
PIF_VALUE: 27
PIF_VALUE: 2
PIF_VALUE: 2
PIF_VALUE: 28
PIF_VALUE: 26
PIF_VALUE: 28
PIF_VALUE: 27
PIF_VALUE: 24
PIF_VALUE: 28
PIF_VALUE: 27
PIF_VALUE: 28
PIF_VALUE: 26
PIF_VALUE: 4
PIF_VALUE: 27
PIF_VALUE: 28
PIF_VALUE: 28
PIF_VALUE: 26
PIF_VALUE: 24
PIF_VALUE: 37
PIF_VALUE: 28
PIF_VALUE: 30
PIF_VALUE: 37
PIF_VALUE: 38
PIF_VALUE: 28
PIF_VALUE: 30
PIF_VALUE: 29
PIF_VALUE: 28
PIF_VALUE: 27
PIF_VALUE: 37
PIF_VALUE: 23
PIF_VALUE: 27
PIF_VALUE: 27
PIF_VALUE: 23
PIF_VALUE: 27
PIF_VALUE: 28
PIF_VALUE: 2
PIF_VALUE: 27
PIF_VALUE: 27
PIF_VALUE: 28
PIF_VALUE: 24
PIF_VALUE: 28
PIF_VALUE: 20
PIF_VALUE: 24
PIF_VALUE: 27
PIF_VALUE: 24
PIF_VALUE: 28
PIF_VALUE: 28
PIF_VALUE: 21
PIF_VALUE: 29
PIF_VALUE: 19
PIF_VALUE: 27
PIF_VALUE: 26
PIF_VALUE: 28
PIF_VALUE: 27
PIF_VALUE: 28
PIF_VALUE: 26
PIF_VALUE: 5
PIF_VALUE: 28
PIF_VALUE: 22
PIF_VALUE: 27
PIF_VALUE: 27
PIF_VALUE: 26
PIF_VALUE: 27
PIF_VALUE: 26
PIF_VALUE: 34
PIF_VALUE: 27
PIF_VALUE: 27
PIF_VALUE: 29
PIF_VALUE: 28
PIF_VALUE: 22
PIF_VALUE: 28
PIF_VALUE: 5
PIF_VALUE: 27
PIF_VALUE: 27
PIF_VALUE: 29
PIF_VALUE: 28

## 2020-02-21 ASSESSMENT — PAIN SCALES - GENERAL
PAINLEVEL_OUTOF10: 8

## 2020-02-21 ASSESSMENT — PAIN DESCRIPTION - PAIN TYPE
TYPE: ACUTE PAIN;SURGICAL PAIN
TYPE: ACUTE PAIN;SURGICAL PAIN

## 2020-02-21 ASSESSMENT — PAIN DESCRIPTION - PROGRESSION
CLINICAL_PROGRESSION: GRADUALLY WORSENING
CLINICAL_PROGRESSION: GRADUALLY WORSENING

## 2020-02-21 ASSESSMENT — PAIN DESCRIPTION - FREQUENCY: FREQUENCY: CONTINUOUS

## 2020-02-21 ASSESSMENT — PAIN DESCRIPTION - ORIENTATION: ORIENTATION: RIGHT

## 2020-02-21 ASSESSMENT — PAIN DESCRIPTION - DESCRIPTORS
DESCRIPTORS: ACHING;DISCOMFORT;DULL
DESCRIPTORS: DISCOMFORT

## 2020-02-21 ASSESSMENT — PAIN - FUNCTIONAL ASSESSMENT: PAIN_FUNCTIONAL_ASSESSMENT: 0-10

## 2020-02-21 ASSESSMENT — PAIN DESCRIPTION - ONSET: ONSET: GRADUAL

## 2020-02-21 NOTE — H&P
Department of Orthopedic Surgery  Physician Assistant Pre-operative History and Physical        DIAGNOSIS:  Right shoulder osteoarthritis    INDICATION:  Right shoulder pain and weakness    PROCEDURE:  Right reverse total shoulder arthroplasty    CHIEF COMPLAINT:  Right shoulder pain and weakness    History Obtained From:  patient    HISTORY OF PRESENT ILLNESS:      The patient is a 62 y.o. female with significant past medical history of right shoulder osteoarthritis who presents for a right reverse total shoulder arthroplasty. Remainder of PMH as below.     Past Medical History:        Diagnosis Date    Arthritis     Asthma     COPD (chronic obstructive pulmonary disease) ([facility] [address]     uses O2 NC 2 L continuous     Depression     Diabetes mellitus (HCC)     Edema leg     Hypertension     Hypothyroidism     Leukocytosis     Obesity     Osteoarthritis     Sleep apnea     no CPAP     Thyroid disease     Type 2 diabetes mellitus without complication ([facility] [address]      Past Surgical History:        Procedure Laterality Date    CARPAL TUNNEL RELEASE Left 7/1/2019    LEFT CARPAL TUNNEL RELEASE performed by [facility]    DILATION AND CURETTAGE OF UTERUS      HERNIA REPAIR      ROTATOR CUFF REPAIR Right     TONSILLECTOMY AND ADENOIDECTOMY      TOTAL KNEE ARTHROPLASTY Left     TUBAL LIGATION       Medications Prior to Admission:   Medications Prior to Admission: fluticasone-umeclidin-vilant (TRELEGY ELLIPTA) 100-62.5-25 MCG/INH AEPB, Inhale 1 puff into the lungs daily (Patient taking differently: Inhale 1 puff into the lungs daily Instructed to take am of procedure)  metFORMIN (GLUCOPHAGE) 500 MG tablet, Take 2 tablets by mouth 2 times daily (with meals) Take 2 tablets 2 times a day  TURMERIC PO, Take by mouth 2 times daily LD 2-14-20  BIOTIN PO, Take by mouth daily LD 2-14-20  levothyroxine (SYNTHROID) 50 MCG tablet, Take 1 tablet by mouth Daily  hydrochlorothiazide (MICROZIDE) 12.5 MG capsule, Take 1 capsule by mouth daily  citalopram (CELEXA) 40 MG tablet, Take 1 tablet by mouth daily Instructed to take am of procedure  glimepiride (AMARYL) 1 MG tablet, Take 1 tablet by mouth 2 times daily (before meals)  albuterol sulfate HFA (PROAIR HFA) 108 (90 Base) MCG/ACT inhaler, INHALE TWO PUFFS BY MOUTH FOUR TIMES A DAY AS NEEDED  gabapentin (NEURONTIN) 100 MG capsule, Take 1 capsule by mouth nightly for 360 days. traMADol (ULTRAM) 50 MG tablet, Take 50 mg by mouth 4 times daily.   Multiple Vitamins-Minerals (THERAPEUTIC MULTIVITAMIN-MINERALS) tablet, Take 1 tablet by mouth daily Ld 2-14-20  Cholecalciferol (VITAMIN D3) 2000 units CAPS, Take 5,000 Units by mouth daily Ld 2-14-20  Coenzyme Q10 (COQ-10) 10 MG CAPS, Take by mouth daily Ld 2-14-20  aspirin 81 MG tablet, Take 81 mg by mouth daily Ld 2-14-20  celecoxib (CELEBREX) 200 MG capsule, Take 100 mg by mouth daily To start after surgery scheduled 2-21-20  Handicap Placard MISC, by Does not apply route Copd      5 yrs  fluticasone (FLONASE) 50 MCG/ACT nasal spray, 1 spray by Each Nostril route daily  Handicap Placard MISC, by Does not apply route Copd      5 yrs  ondansetron (ZOFRAN) 4 MG tablet, Take 1 tablet by mouth every 8 hours as needed for Nausea or Vomiting (Patient not taking: Reported on 8/14/2019)  ipratropium-albuterol (DUONEB) 0.5-2.5 (3) MG/3ML SOLN nebulizer solution, Inhale 1 vial into the lungs every 4 hours as needed for Shortness of Breath Instructed to take am of procedure if needed  loratadine (CLARITIN) 10 MG capsule, Take 10 mg by mouth daily  Allergies:  Macrobid [nitrofurantoin macrocrystal]  History of allergic reaction to anesthesia:  No    Social History:   Social History     Socioeconomic History    Marital status:      Spouse name: None    Number of children: None    Years of education: None    Highest education level: None   Occupational History    None   Social Family History:   History reviewed. No pertinent family history. REVIEW OF SYSTEMS:  (POSITVE IN BOLD)  General:  Fever     Chills     Fatigue     Nausea / Vomiting     Night sweats  Skin: Sores     Rash     Bruising  Head: Headaches       Eyes: Blurred vision     Double vision     Pain  Ears: Hearing loss     Vertigo  Mouth/Throat: Dentures      Snoring     Dental cavities     Bleeding gums  Neck: Stiffness     Pain  Heart: Murmur     Chest Pain     Hypertension     Leg swelling     Stroke   Respiratory: Cough     Wheezing     Pneumonia     COPD     Asthma   SOB  GI: Hepatitis   Diarrhea   Constipation     Dark stools    Stomach ulcers  Urinary: Pain with urination     UTI     Prostate disease     Kidney infection  Vascular: Leg ulcers     DVT     Varicose Veins   Musculoskeletal: Pain     Weakness  Right shoulder,   Instability     Fracture     Arthritis  Neurologic: Numbness     Tingling     Seizures     Tremors  Endocrine: Diabetes     Thyroid disease   Psychiatric: Anxiety     Depression     Drug/Alcohol abuse       PHYSICAL EXAM:  Constitutional:  Alert and oriented, appropriate mood and affect  Gait: Steady gait  Eyes:    EOM intact  ENMT: Hearing normal  Neck: No JVD  Respiratory: Unlabored breathing   CV: RRR per peripheral pulses  Psych: A & O x 3; Recent/Remote memory intact; Normal mood and affect  MSK:   right shoulder decreased strength with resisted external rotation, crepitus with ROM, extremity warm and well perfused    DATA:  Preoperative labs as ordered    ASSESSMENT AND PLAN:    1. Patient is a 62 y.o. female with above specified procedure planned right reverse total shoulder arthriplasty with anesthesia. Patient to get 1gm Vanco IV in addition to ancef in light of positive MRSA colonization. 2.  Procedure options, risks and benefits reviewed with patient. Patient expresses understanding.       Electronically signed by Rosi Harden PA-C on 2/21/2020 at 11:23 AM

## 2020-02-21 NOTE — DISCHARGE INSTR - COC
Continuity of Care Form    Patient Name: Marco A Ruff   :  [birth year]  MRN:  [id number]    Admit date:  2020  Discharge date:  ***    Code Status Order: Full Code   Advance Directives:     Admitting Physician:  Fortino Phillips MD  PCP: Beth Jose DO    Discharging Nurse: [facility] Unit/Room#: OR POOL/NONE  Discharging Unit Phone Number: ***    Emergency Contact:   Extended Emergency Contact Information  Primary Emergency Contact: Lance Amador  Home Phone: [phone]  Mobile Phone: [phone]  Relation: Child  Preferred language: English   needed? No  Secondary Emergency Contact: López Amador  Home Phone: [phone]  Mobile Phone: [phone]  Relation: Child  Preferred language: English   needed?  No    Past Surgical History:  Past Surgical History:   Procedure Laterality Date    CARPAL TUNNEL RELEASE Left 2019    LEFT CARPAL TUNNEL RELEASE performed by Fortino Phillips MD at [facility] 3    DILATION AND CURETTAGE OF UTERUS      HERNIA REPAIR      ROTATOR CUFF REPAIR Right     TONSILLECTOMY AND ADENOIDECTOMY      TOTAL KNEE ARTHROPLASTY Left     TUBAL LIGATION         Immunization History:   Immunization History   Administered Date(s) Administered    Tdap (Boostrix, Adacel) 10/30/2013       Active Problems:  Patient Active Problem List   Diagnosis Code    Essential hypertension I10    Pulmonary emphysema ([address] J43.9    Type 2 diabetes mellitus without complication, without long-term current use of insulin ([facility] [address] E11.9    Primary osteoarthritis of right shoulder M19.011    S/P reverse total shoulder arthroplasty, right Z96.611       Isolation/Infection:   Isolation          No Isolation        Patient Infection Status     Infection Onset Added Last Indicated Last Indicated By Review Planned Expiration Resolved Resolved By    [facility] 20 MRSA SCREENING CULTURE ONLY              Nurse Assessment:  Last Vital Signs: BP (!) 141/75   Pulse 85   Temp 97.6 °F (36.4 °C) (Temporal)   Resp 14   Ht 4' 10\" (1.473 m)   Wt 209 lb (94.8 kg)   SpO2 96%   BMI 43.68 kg/m²     Last documented pain score (0-10 scale): Pain Level: 8  Last Weight:   Wt Readings from Last 1 Encounters:   20 209 lb (94.8 kg)     Mental Status:  {IP PT MENTAL STATUS:}    IV Access:  { RAND IV ACCESS:011806573}    Nursing Mobility/ADLs:  Walking   {CHP DME RMXS:700729608}  Transfer  {CHP DME SYRX:534907022}  Bathing  {CHP DME SOIH:419454743}  Dressing  {CHP DME QTHK:207716529}  Toileting  {CHP DME YKH}  Feeding  {CHP DME FLOE:674985875}  Med Admin  {P DME XZHP:225614323}  Med Delivery   {[facility] MED Delivery:280422163}    Wound Care Documentation and Therapy:        Elimination:  Continence:   · Bowel: {YES / GK:19461}  · Bladder: {YES / HC:73042}  Urinary Catheter: {Urinary Catheter:936165196}   Colostomy/Ileostomy/Ileal Conduit: {YES / RP:28312}       Date of Last BM: ***  No intake or output data in the 24 hours ending 20 1508  No intake/output data recorded.     Safety Concerns:     [employer] Safety Concerns:939431850}    Impairments/Disabilities:      [employer] Impairments/Disabilities:143073178}    Nutrition Therapy:  Current Nutrition Therapy:   [employer] Diet List:588733243}    Routes of Feeding: {CHP DME Other Feedings:965219001}  Liquids: {Slp liquid thickness:86067}  Daily Fluid Restriction: {CHP DME Yes amt example:686432288}  Last Modified Barium Swallow with Video (Video Swallowing Test): {Done Not Done BZDH:402765920}    Treatments at the Time of Hospital Discharge:   Respiratory Treatments: ***  Oxygen Therapy:  {Therapy; copd oxygen:48606}  Ventilator:    { CC Vent VDWR:049131125}    Rehab Therapies: {THERAPEUTIC INTERVENTION:1125886043}  Weight Bearing Status/Restrictions: [address] Weight Bearin}  Other Medical Equipment (for information only, NOT a DME order): {EQUIPMENT:784407484}  Other Treatments: ***    Patient's personal belongings (please select all that are sent with patient):  {CHP DME Belongings:886383942}    RN SIGNATURE:  {Esignature:546216621}    CASE MANAGEMENT/SOCIAL WORK SECTION    Inpatient Status Date: ***    Readmission Risk Assessment Score:  Readmission Risk              Risk of Unplanned Readmission:        7           Discharging to Facility/ Agency   · Name:   · Address:  · Phone:  · Fax:    Dialysis Facility (if applicable)   · Name:  · Address:  · Dialysis Schedule:  · Phone:  · Fax:    / signature: {Esignature:666575681}    PHYSICIAN SECTION    Prognosis: {Prognosis:9633136898}    Condition at Discharge: [address] [place] [address] Patient Condition:142432037}    Rehab Potential (if transferring to Rehab): {Prognosis:4872284969}    Recommended Labs or Other Treatments After Discharge: ***    Physician Certification: I certify the above information and transfer of Alfredo Vazquez  is necessary for the continuing treatment of the diagnosis listed and that she requires {Admit to Appropriate Level of Care:96162} for {GREATER/LESS:117793476} 30 days.      Update Admission H&P: {CHP DME Changes in DSXOX:055344308}    PHYSICIAN SIGNATURE:  {Esignature:193692014}

## 2020-02-21 NOTE — OP NOTE
Date of Procedure:   2/21/2020    Surgeon:   Carole Galvan MD    Assistant:   Kt Hartman PA-C      Preoperative Diagnosis:   Right shoulder rotator cuff tear arthropathy    Post-Op Diagnosis:   Same    Operative Procedure:   Right reverse total shoulder arthroplasty    Implants:     DJO Reverse Total Shoulder system      Size 10 x 108 Altivate P2 Press-fit Reverse humeral stem       Glenoid baseplate      32 mm  -4 mm Glenosphere      Standard 32mm neutral Polyethylene humeral socket    Indications:                   Patient is a 62 y.o. female who has been having ongoing severe Right shoulder pain. The patient exhibited pseudoparalysis and has failed to improve with nonoperative management including but not limited to pain medications, activity modification, and a home exercise program.  Secondary to the patient's continued pain and inability to perform activities of daily living, the patient wished to proceed with a reverse total shoulder arthroplasty. Therefore the risks, benefits, alternatives, options, complications, and convalescence were thoroughly reviewed with the patient regarding a Right reverse total shoulder arthroplasty. Patient understood these, voiced excellent informed consent, and wished to proceed with the operation. Operation:                   After patient was appropriately identified and surgical site was marked, an interscalene block was placed in the pre-op holding area by the anesthesia team under ultrasound guidance. Patient was then brought back to the operating room where successful general endotracheal anesthesia was administered. Patient is given appropriate antibiotics within 1 hour of the start of the operation.   Patient was then placed in the beachchair position with the Spider beachchair positioner where all bony prominences were well-padded and the foam face guard was placed being careful not to apply any pressure to his eyes and his cervical spine was placed in a neutral position. Patient was then prepped and draped in a sterile fashion. Surgical timeout was performed. Vertical incision was made based over the coracoid process and brought down just lateral to the axillary fold. The incision was wiped down with betadine. A standard Deltopectoral approach was then performed tying off the cephalic vein. Subdeltoid space was bluntly dissected and the subacromial bursa excised. Biceps tenodesis was performed in the standard fashion. Blunt dissection was carried out lateral to the conjoined tendon. Axillary nerve was palpated and protected throughout the case using a blunt Dejuan retractor. Anterior humeral circumflex vessels were then dissected and cauterized in the standard fashion. Subscapularis tendon was then taken down off of the lesser tuberosity and tagged with 2-0 Ethibond sutures. The humeral head was then dislocated anteriorly in the standard fashion. While protecting the soft tissues the humeral cutting guide was placed with 30° of retroversion at the level just above the anatomic neck of the humerus. This is pinned in place. The humeral head was cut in the standard fashion. The humeral head protector was then impacted in the place and attention was then turned to the glenoid. A Fukuda retractor was placed to retract the humeral head while a blunt Dejuan was utilized to protect the axillary nerve. Remaining portion of the long head of the biceps tendon and labrum was excised. Anterior capsule was excised off the undersurface of the subscapularis. In the subscapularis recess was bluntly developed using a Phan elevator. While protecting the axillary nerve the inferior capsule was released off of the inferior glenoid. The Fukuda retractor was then replaced with the batwing-type posterior shoulder retractor.   A pointed Dejuan was then carefully placed to elevate the posterior superior soft tissues and full exposure of the glenoid was then obtained. The version of the glenoid clinically as well as based on the preoperative CT scan was then evaluated. The baseplate drill guide for the RSP baseplate was then placed and a  hole was made inferior to the center of the glenoid. The guide screw was then placed in the standard fashion. An sequential reaming of the glenoid was performed with a 10° inferior tilt. The bur was then utilized to take down any remaining peripheral bone. Once this was cleared to where the baseplate and Glenosphere would sit flush with the glenoid, the RSP glenoid baseplate was then screwed into place having excellent purchase. The superior screw was drilled first appropriate length screw was measured and then placed in the standard fashion locking into the baseplate. Sequential screws were placed respectively inferiorly anteriorly and then posteriorly in a similar manner. Excellent security the baseplate was noted. The peripheral reamer of the baseplate was then utilized. And the neutral 32 -4 mm Glenosphere was impacted onto the baseplate in the standard fashion. Centralizing screw was placed in a torque limiting fashion. This was checked and noted to be well secured. A plastic Derrah retractor was then placed and the humeral head was then dislocated in the standard fashion being careful not to scratch the Glenosphere. Attention was then turned back to the proximal humerus. Multiple loose bodies were removed including 4-5 implants and suture from her previous rotator cuff repair. Canal finder was then used to gain entry into the intramedullary canal with the starting point just posterior to the bicipital groove. Sequential hand reaming was carried out up to a size 10 mm. Sequential broaching was then carried out with approximately 25° of retroversion up to a size 10. Size 10 was seated very well within the metaphyseal bone.   I was concerned about going up to a 12 and splitting

## 2020-02-21 NOTE — BRIEF OP NOTE
Brief Postoperative Note  ______________________________________________________________    Patient: Adeola Buckley  YOB: 1955  MRN: [id number]  Date of Procedure: 2/21/2020    Pre-Op Diagnosis: OSTEOARTHRITIS    Post-Op Diagnosis: Same       Procedure(s):  RIGHT REVERSE TOTAL SHOULDER ARTHROPLASTY REVERSE  ++DJO++   ++ISB++    Anesthesia: GETA with Interscalene block    Surgeon(s):  Derek Trejo MD    Assistant: Devante Clemens PA-c    Estimated Blood Loss (mL): 173 cc    Complications: None    Specimens:   sent    Implants:  See dict      Drains: * No LDAs found *    Findings: see dict    Derek Trejo MD  Date: 2/21/2020  Time: 11:34 AM

## 2020-02-21 NOTE — INTERVAL H&P NOTE
H&P Update    Patient's History and Physical from February 21, 2020 was reviewed. Patient examined. There has been no change.     Electronically signed by Danie Kaur MD on 2/21/20 at 11:34 AM

## 2020-02-21 NOTE — DISCHARGE INSTR - ACTIVITY
Okay to shower upon discharge. Aquacel to remain x 7 days. Once removed, cover incision site with dry gauze and paper tape. If no drainage x 48 hours, okay to leave incision open to air. Maintain ultrasling until follow up, remove only to shower. No lifting, pushing, or pulling with right upper extremity until cleared by surgeon. No driving x 1 month. No driving while taking narcotic pain medication.

## 2020-02-21 NOTE — ANESTHESIA PROCEDURE NOTES
Peripheral Block    Patient location during procedure: pre-op  Start time: 2/21/2020 12:38 PM  End time: 2/21/2020 12:51 PM  Staffing  Anesthesiologist: Juan David Castillo MD  Performed: anesthesiologist   Preanesthetic Checklist  Completed: patient identified, site marked, surgical consent, pre-op evaluation, timeout performed, IV checked, risks and benefits discussed, monitors and equipment checked, anesthesia consent given, oxygen available and patient being monitored  Peripheral Block  Patient position: supine  Prep: ChloraPrep  Patient monitoring: cardiac monitor, continuous pulse ox, frequent blood pressure checks and IV access  Block type: Brachial plexus  Laterality: right  Injection technique: single-shot  Procedures: ultrasound guided  Local infiltration: ropivacaine  Infiltration strength: 0.5 %  Dose: 29 mL  Interscalene  Provider prep: mask and sterile gloves  Local infiltration: ropivacaine  Needle  Needle type: combined needle/nerve stimulator   Needle gauge: 22 G  Needle length: 5 cm  Needle localization: ultrasound guidance  Assessment  Injection assessment: negative aspiration for heme, no paresthesia on injection and local visualized surrounding nerve on ultrasound  Paresthesia pain: none  Slow fractionated injection: yes  Hemodynamics: stable  Additional Notes  1ml of 0.5% ropivacaine given for skin wheal;  29ml of same concentration given for nerve block. With her short neck and body habitus, it was easy to visualize the brachial plexus at the supraclavicular position. However, it was difficult to visualize clearly in the classic interscalene position between the scalene muscles. Her external jugular vein was also in very close proximity. The majority of the injection was done around the brachial plexus in a position between the classical supraclavicular and interscalene positions. Patient informed that she will get some relief but may not get full relief in the deltoid area due to her anatomy. Total of 1mg IV midazolam and 25mcg IV fentanyl given for the nerve block. No complications.   Reason for block: post-op pain management and at surgeon's request

## 2020-02-22 VITALS
SYSTOLIC BLOOD PRESSURE: 136 MMHG | DIASTOLIC BLOOD PRESSURE: 73 MMHG | HEART RATE: 104 BPM | OXYGEN SATURATION: 95 % | WEIGHT: 209 LBS | HEIGHT: 58 IN | RESPIRATION RATE: 16 BRPM | BODY MASS INDEX: 43.87 KG/M2 | TEMPERATURE: 98.2 F

## 2020-02-22 PROBLEM — M19.011 PRIMARY OSTEOARTHRITIS OF RIGHT SHOULDER: Chronic | Status: ACTIVE | Noted: 2020-02-14

## 2020-02-22 LAB
ANION GAP SERPL CALCULATED.3IONS-SCNC: 12 MMOL/L (ref 7–16)
BUN BLDV-MCNC: 18 MG/DL (ref 8–23)
CALCIUM SERPL-MCNC: 8.3 MG/DL (ref 8.6–10.2)
CHLORIDE BLD-SCNC: 96 MMOL/L (ref 98–107)
CO2: 24 MMOL/L (ref 22–29)
CREAT SERPL-MCNC: 0.9 MG/DL (ref 0.5–1)
GFR AFRICAN AMERICAN: >60
GFR NON-AFRICAN AMERICAN: >60 ML/MIN/1.73
GLUCOSE BLD-MCNC: 235 MG/DL (ref 74–99)
HCT VFR BLD CALC: 41.1 % (ref 34–48)
HEMOGLOBIN: 13 G/DL (ref 11.5–15.5)
MCH RBC QN AUTO: 27.4 PG (ref 26–35)
MCHC RBC AUTO-ENTMCNC: 31.6 % (ref 32–34.5)
MCV RBC AUTO: 86.7 FL (ref 80–99.9)
METER GLUCOSE: 288 MG/DL (ref 74–99)
PDW BLD-RTO: 14 FL (ref 11.5–15)
PLATELET # BLD: 310 E9/L (ref 130–450)
PMV BLD AUTO: 12.1 FL (ref 7–12)
POTASSIUM SERPL-SCNC: 4.4 MMOL/L (ref 3.5–5)
RBC # BLD: 4.74 E12/L (ref 3.5–5.5)
SODIUM BLD-SCNC: 132 MMOL/L (ref 132–146)
WBC # BLD: 16.4 E9/L (ref 4.5–11.5)

## 2020-02-22 PROCEDURE — 94640 AIRWAY INHALATION TREATMENT: CPT

## 2020-02-22 PROCEDURE — 6360000002 HC RX W HCPCS: Performed by: ORTHOPAEDIC SURGERY

## 2020-02-22 PROCEDURE — 36415 COLL VENOUS BLD VENIPUNCTURE: CPT

## 2020-02-22 PROCEDURE — 85027 COMPLETE CBC AUTOMATED: CPT

## 2020-02-22 PROCEDURE — G0378 HOSPITAL OBSERVATION PER HR: HCPCS

## 2020-02-22 PROCEDURE — 6360000002 HC RX W HCPCS: Performed by: INTERNAL MEDICINE

## 2020-02-22 PROCEDURE — 96376 TX/PRO/DX INJ SAME DRUG ADON: CPT

## 2020-02-22 PROCEDURE — 2700000000 HC OXYGEN THERAPY PER DAY

## 2020-02-22 PROCEDURE — 6370000000 HC RX 637 (ALT 250 FOR IP): Performed by: INTERNAL MEDICINE

## 2020-02-22 PROCEDURE — 99214 OFFICE O/P EST MOD 30 MIN: CPT | Performed by: INTERNAL MEDICINE

## 2020-02-22 PROCEDURE — 96374 THER/PROPH/DIAG INJ IV PUSH: CPT

## 2020-02-22 PROCEDURE — 6370000000 HC RX 637 (ALT 250 FOR IP): Performed by: ORTHOPAEDIC SURGERY

## 2020-02-22 PROCEDURE — 80048 BASIC METABOLIC PNL TOTAL CA: CPT

## 2020-02-22 PROCEDURE — 2580000003 HC RX 258: Performed by: ORTHOPAEDIC SURGERY

## 2020-02-22 PROCEDURE — 82962 GLUCOSE BLOOD TEST: CPT

## 2020-02-22 PROCEDURE — 97535 SELF CARE MNGMENT TRAINING: CPT

## 2020-02-22 PROCEDURE — 97530 THERAPEUTIC ACTIVITIES: CPT

## 2020-02-22 PROCEDURE — 97165 OT EVAL LOW COMPLEX 30 MIN: CPT

## 2020-02-22 PROCEDURE — 97161 PT EVAL LOW COMPLEX 20 MIN: CPT

## 2020-02-22 RX ORDER — DOCUSATE SODIUM 100 MG/1
100 CAPSULE, LIQUID FILLED ORAL 2 TIMES DAILY PRN
Qty: 30 CAPSULE | Refills: 1 | Status: SHIPPED | OUTPATIENT
Start: 2020-02-22 | End: 2021-07-27

## 2020-02-22 RX ORDER — OXYCODONE HYDROCHLORIDE 5 MG/1
5-10 TABLET ORAL EVERY 4 HOURS PRN
Qty: 50 TABLET | Refills: 0 | Status: SHIPPED | OUTPATIENT
Start: 2020-02-22 | End: 2020-02-29

## 2020-02-22 RX ADMIN — INSULIN LISPRO 4 UNITS: 100 INJECTION, SOLUTION INTRAVENOUS; SUBCUTANEOUS at 08:19

## 2020-02-22 RX ADMIN — HYDROMORPHONE HYDROCHLORIDE 0.5 MG: 1 INJECTION, SOLUTION INTRAMUSCULAR; INTRAVENOUS; SUBCUTANEOUS at 12:46

## 2020-02-22 RX ADMIN — HYDROCHLOROTHIAZIDE 12.5 MG: 12.5 TABLET ORAL at 08:19

## 2020-02-22 RX ADMIN — IPRATROPIUM BROMIDE 0.5 MG: 0.5 SOLUTION RESPIRATORY (INHALATION) at 11:51

## 2020-02-22 RX ADMIN — HYDROMORPHONE HYDROCHLORIDE 0.5 MG: 1 INJECTION, SOLUTION INTRAMUSCULAR; INTRAVENOUS; SUBCUTANEOUS at 05:11

## 2020-02-22 RX ADMIN — OXYCODONE HYDROCHLORIDE 10 MG: 5 TABLET ORAL at 11:26

## 2020-02-22 RX ADMIN — CITALOPRAM HYDROBROMIDE 40 MG: 20 TABLET ORAL at 08:19

## 2020-02-22 RX ADMIN — OXYCODONE HYDROCHLORIDE 10 MG: 5 TABLET ORAL at 07:08

## 2020-02-22 RX ADMIN — INSULIN LISPRO 6 UNITS: 100 INJECTION, SOLUTION INTRAVENOUS; SUBCUTANEOUS at 11:26

## 2020-02-22 RX ADMIN — MUPIROCIN: 20 OINTMENT TOPICAL at 08:20

## 2020-02-22 RX ADMIN — ASPIRIN 325 MG: 325 TABLET, DELAYED RELEASE ORAL at 08:19

## 2020-02-22 RX ADMIN — CEFAZOLIN SODIUM 2 G: 2 SOLUTION INTRAVENOUS at 07:06

## 2020-02-22 RX ADMIN — BUDESONIDE 250 MCG: 0.25 SUSPENSION RESPIRATORY (INHALATION) at 07:35

## 2020-02-22 RX ADMIN — ACETAMINOPHEN 650 MG: 325 TABLET ORAL at 08:19

## 2020-02-22 RX ADMIN — CELECOXIB 100 MG: 100 CAPSULE ORAL at 08:19

## 2020-02-22 RX ADMIN — IPRATROPIUM BROMIDE 0.5 MG: 0.5 SOLUTION RESPIRATORY (INHALATION) at 07:35

## 2020-02-22 RX ADMIN — ARFORMOTEROL TARTRATE 15 MCG: 15 SOLUTION RESPIRATORY (INHALATION) at 07:35

## 2020-02-22 RX ADMIN — SENNOSIDES AND DOCUSATE SODIUM 1 TABLET: 8.6; 5 TABLET ORAL at 08:19

## 2020-02-22 RX ADMIN — ACETAMINOPHEN 650 MG: 325 TABLET ORAL at 01:56

## 2020-02-22 RX ADMIN — LEVOTHYROXINE SODIUM 50 MCG: 50 TABLET ORAL at 05:11

## 2020-02-22 RX ADMIN — Medication 10 ML: at 08:21

## 2020-02-22 RX ADMIN — OXYCODONE HYDROCHLORIDE 10 MG: 5 TABLET ORAL at 01:56

## 2020-02-22 ASSESSMENT — PAIN DESCRIPTION - PROGRESSION
CLINICAL_PROGRESSION: GRADUALLY WORSENING

## 2020-02-22 ASSESSMENT — PAIN SCALES - GENERAL
PAINLEVEL_OUTOF10: 0
PAINLEVEL_OUTOF10: 8
PAINLEVEL_OUTOF10: 0
PAINLEVEL_OUTOF10: 7

## 2020-02-22 ASSESSMENT — PAIN DESCRIPTION - FREQUENCY
FREQUENCY: CONTINUOUS

## 2020-02-22 ASSESSMENT — PAIN DESCRIPTION - PAIN TYPE
TYPE: ACUTE PAIN;SURGICAL PAIN
TYPE: SURGICAL PAIN
TYPE: ACUTE PAIN;SURGICAL PAIN

## 2020-02-22 ASSESSMENT — PAIN - FUNCTIONAL ASSESSMENT
PAIN_FUNCTIONAL_ASSESSMENT: PREVENTS OR INTERFERES SOME ACTIVE ACTIVITIES AND ADLS
PAIN_FUNCTIONAL_ASSESSMENT: PREVENTS OR INTERFERES SOME ACTIVE ACTIVITIES AND ADLS

## 2020-02-22 ASSESSMENT — PAIN DESCRIPTION - DESCRIPTORS
DESCRIPTORS: ACHING;DISCOMFORT;DULL
DESCRIPTORS: ACHING;DISCOMFORT;DULL

## 2020-02-22 ASSESSMENT — PAIN DESCRIPTION - ORIENTATION
ORIENTATION: RIGHT

## 2020-02-22 ASSESSMENT — PAIN DESCRIPTION - ONSET
ONSET: ON-GOING
ONSET: GRADUAL

## 2020-02-22 ASSESSMENT — PAIN DESCRIPTION - LOCATION
LOCATION: SHOULDER

## 2020-02-22 NOTE — PROGRESS NOTES
Department of Orthopedic Surgery  Physician Assistant LUKAS Progress Note    Harvey Pat  2/22/2020  8:52 AM      Subjective:  Tiffany Waite doing well, no acute events overnight. Resting comfortably and tolerating sling. Denies shortness of breath or chest pain. No nausea or vomiting. Vitals  VITALS:  BP (!) 147/68   Pulse 108   Temp 98.5 °F (36.9 °C) (Oral)   Resp 18   Ht 4' 10\" (1.473 m)   Wt 209 lb (94.8 kg)   SpO2 95%   BMI 43.68 kg/m²     PHYSICAL EXAM:    Orientation:  alert and oriented to person, place and time    Right Upper Extremity  Incision:  dressing in place, clean and dry  Sling in place properly. Upper arm and forearm compartments soft, non-tender. Axillary sensation intact. Deltoid fires on exam.  Musculocutaneous nerve function intact. R,U,M, AIN, PIN motor function intact. Able to move all 5 digits. Normal capillary refill. Digits well perfused and sensate. 2+ radial and brachial pulses. LABS:  CBC with Differential:    Lab Results   Component Value Date    WBC 16.4 02/22/2020    RBC 4.74 02/22/2020    HGB 13.0 02/22/2020    HCT 41.1 02/22/2020     02/22/2020    MCV 86.7 02/22/2020    MCH 27.4 02/22/2020    MCHC 31.6 02/22/2020    RDW 14.0 02/22/2020    LYMPHOPCT 11.0 08/29/2019    MONOPCT 4.9 08/29/2019    BASOPCT 0.2 08/29/2019    MONOSABS 1.00 08/29/2019    LYMPHSABS 2.26 08/29/2019    EOSABS 0.01 08/29/2019    BASOSABS 0.05 08/29/2019     BMP:    Lab Results   Component Value Date     02/22/2020    K 4.4 02/22/2020    CL 96 02/22/2020    CO2 24 02/22/2020    BUN 18 02/22/2020    LABALBU 4.4 08/29/2019    CREATININE 0.9 02/22/2020    CALCIUM 8.3 02/22/2020    GFRAA >60 02/22/2020    LABGLOM >60 02/22/2020    GLUCOSE 235 02/22/2020     Radiology Review:  Postop xray reviewed.  Stable no acute osseous injury    ASSESSMENT AND PLAN:    Post operative day 1 status post right reverse total shoulder arthroplasty    - Continue pain control  - DVT ppx: ASA 325mg bid x 6 weeks, Paz, SCDs  - Continue PT/OT  - Finish periop abx  - Maintain NIEVES foote  Dispo: home today with home care, follow up 2 weeks        Philip Mckoy PA-C

## 2020-02-22 NOTE — PROGRESS NOTES
[facility] Hospitalist   Progress Note    Admitting Date and Time: 2/21/2020 10:07 AM  Admit Dx: S/P reverse total shoulder arthroplasty, right [Z96.611]     Seen for follow-up on right shoulder degenerative arthritis status post TSR     Subjective:  Seen for follow up from, medical side. Denies CP ,sob or abd pain. Post op pain reasonably well controlled. ROS: denies fever, chills, cp, sob, n/v, HA unless stated above.      celecoxib  100 mg Oral Daily    gabapentin  100 mg Oral Nightly    levothyroxine  50 mcg Oral Daily    sodium chloride flush  10 mL Intravenous 2 times per day    acetaminophen  650 mg Oral Q6H    sennosides-docusate sodium  1 tablet Oral BID    aspirin  325 mg Oral BID    influenza virus vaccine  0.5 mL Intramuscular Prior to discharge    mupirocin   Topical Daily    citalopram  40 mg Oral Daily    [Held by provider] glimepiride  1 mg Oral BID AC    hydrochlorothiazide  12.5 mg Oral Daily    budesonide  250 mcg Nebulization BID    ipratropium  0.5 mg Nebulization 4x daily    Arformoterol Tartrate  15 mcg Nebulization BID    insulin lispro  0-12 Units Subcutaneous TID WC    insulin lispro  0-6 Units Subcutaneous Nightly     sodium chloride flush, 10 mL, PRN  magnesium hydroxide, 30 mL, Daily PRN  ondansetron, 4 mg, Q6H PRN  oxyCODONE, 5 mg, Q4H PRN    Or  oxyCODONE, 10 mg, Q4H PRN  HYDROmorphone, 0.25 mg, Q3H PRN    Or  HYDROmorphone, 0.5 mg, Q3H PRN  ondansetron, 4 mg, Q8H PRN  glucose, 15 g, PRN  dextrose, 12.5 g, PRN  glucagon (rDNA), 1 mg, PRN  dextrose, 100 mL/hr, PRN  albuterol, 2.5 mg, Q4H PRN         Objective:    /78   Pulse 107   Temp 98 °F (36.7 °C) (Oral)   Resp 18   Ht 4' 10\" (1.473 m)   Wt 209 lb (94.8 kg)   SpO2 94%   BMI 43.68 kg/m²   General Appearance: alert and oriented to person, place and time, in no acute distress  Skin: warm and dry, no rash or erythema  Head: normocephalic and atraumatic  Eyes: pupils equal, round, and reactive to light, extraocular eye movements intact, conjunctivae normal  Neck: supple and non-tender without mass  Pulmonary/Chest: clear to auscultation bilaterally  Cardiovascular: normal rate, regular rhythm, normal S1 and S2  Abdomen: soft, non-tender, non-distended, normal bowel sounds, no masses or organomegaly  Extremities: no cyanosis, clubbing,rt shoulder with dressing d/i ,sling on  Musculoskeletal: normal range of motion  Neurologic:no cranial nerve deficit,speech normal      Recent Labs     02/22/20  0511      K 4.4   CL 96*   CO2 24   BUN 18   CREATININE 0.9   GLUCOSE 235*   CALCIUM 8.3*       Recent Labs     02/22/20  0511   WBC 16.4*   RBC 4.74   HGB 13.0   HCT 41.1   MCV 86.7   MCH 27.4   MCHC 31.6*   RDW 14.0      MPV 12.1*       Labs and images reviewed     Radiology:   XR Shoulder Right 1 VW   Final Result      1. Satisfactory appearance of new right shoulder prosthesis on single   view. 2. Resection of the distal clavicle. Assessment:    Principal Problem:    S/P reverse total shoulder arthroplasty, right  Active Problems:    Essential hypertension    Pulmonary emphysema (HCC)    Type 2 diabetes mellitus without complication, without long-term current use of insulin (HCC)    Primary osteoarthritis of right shoulder  Resolved Problems:    * No resolved hospital problems. *      Plan:  Right shoulder degenerative arthritis status post right TSA-continue postop care as per Ortho, on asa 325 mg bid  for DVT prophylaxis as per ortho.     Hypertension-resume as per home    Type 2 diabetes- On ISS    COPD- On nebulized rxa and others as per home    Hypothyroidism -resume as per home     Full code     Has reactive Leucocytosis and is down trending    Electronically signed by Ahsan Brooks MD on 2/22/2020 at 7:59 AM

## 2020-02-22 NOTE — PROGRESS NOTES
Physical Therapy  Facility/Department: [address] ORTHO SURGERY  Daily Treatment Note  NAME: Ranjit Baumann  : 1955  MRN: [id number]    Date of Service: 2020       has a past medical history of Arthritis, Asthma, COPD (chronic obstructive pulmonary disease) ([facility] Depression, Diabetes mellitus ([facility] Edema leg, Hypertension, Hypothyroidism, Leukocytosis, Obesity, Osteoarthritis, Sleep apnea, Thyroid disease, and Type 2 diabetes mellitus without complication ([facility] has a past surgical history that includes Tonsillectomy and adenoidectomy; Total knee arthroplasty (Left); hernia repair; Rotator cuff repair (Right);  section; Tubal ligation; Carpal tunnel release (Left, 2019); and Dilation and curettage of uterus. Name: Ranjit Baumann  :   MRN: [id number]    Referring Provider:  Darla Spurling, MD    Date of Service: 2020    Evaluating PT:  Kacey Agrawal PT [id number]    Room #:  8431/7597-P  Diagnosis:  S/p right reverse total shoulder (2020)  Precautions:  Fall risk, sling, NWB right UE, O2  Equipment Needs:  none    SUBJECTIVE:    Pt lives with her  and children in a bi-level home with 4 stairs to enter and 2 rail. Bed and bath is on top floor. Pt ambulated with no device PTA. Pt reported being independent with functional mobility. OBJECTIVE:   Initial Evaluation  Date:  Treatment Short Term/ Long Term   Goals   Was pt agreeable to Eval/treatment? yes     Does pt have pain? 7/10 right shoulder     Bed Mobility  Rolling: NT  Supine to sit: Supervision (HOB elevated)  Sit to supine: NT  Scooting: supervision to sitting EOB  independent   Transfers Sit to stand: SBA  Stand to sit: SBA  Stand pivot: SBA without device. supervision   Ambulation    15 feet and 75 feet with no device SB/Supervision.    200+ feet with no device supervision    Stair negotiation: ascended and descended  NT  4 steps with 1 rail SBA   ROM BLE:  WFL     Strength BLE:  Grossly 4/5

## 2020-02-22 NOTE — DISCHARGE SUMMARY
Discharge Summary     Date:2/22/2020        Patient Name:Leny Amador     YOB: 1955     Age:64 y.o. Admit Date:2/21/2020   Admission Condition:good   Discharged Condition:good  Discharge Date: 02/22/20     Discharge Diagnoses   Principal Problem:    S/P reverse total shoulder arthroplasty, right  Active Problems:    Essential hypertension    Pulmonary emphysema (HCC)    Type 2 diabetes mellitus without complication, without long-term current use of insulin (HCC)    Primary osteoarthritis of right shoulder  Resolved Problems:    * No resolved hospital problems. [facility] Stay   Narrative of Hospital Course: Lor Wilkinson presented on 2/21/20202 for a right reverse total shoulder arthroplasty. She underwent the planned procedure, without complications and was admitted for postop care. Her hospital course was without complications. She progressed appropriately with PT and will d/c home with home care. Follow up in 2 weeks' time. All questions answered and concerns addressed prior to discharge. Exam stable and labs stable as below on day of discharge.      Consultants:   IP CONSULT TO PRIMARY CARE PROVIDER  IP CONSULT TO PULMONOLOGY  IP CONSULT TO SOCIAL WORK    Surgeries/Procedures Performed:  Procedure(s):  RIGHT SHOULDER TOTAL ARTHROPLASTY REVERSE WITH ISB        Significant Diagnostic Studies:   Recent Labs:  CBC:   Lab Results   Component Value Date    WBC 16.4 02/22/2020    RBC 4.74 02/22/2020    HGB 13.0 02/22/2020    HCT 41.1 02/22/2020    MCV 86.7 02/22/2020    MCH 27.4 02/22/2020    MCHC 31.6 02/22/2020    RDW 14.0 02/22/2020     02/22/2020     BMP:    Lab Results   Component Value Date    GLUCOSE 235 02/22/2020     02/22/2020    K 4.4 02/22/2020    CL 96 02/22/2020    CO2 24 02/22/2020    ANIONGAP 12 02/22/2020    BUN 18 02/22/2020    CREATININE 0.9 02/22/2020    CALCIUM 8.3 02/22/2020    LABGLOM >60 02/22/2020    GFRAA >60 02/22/2020     PT/INR:  No results found for: PTINR, PROTIME, INR    Radiology Last 7 Days:  Xr Chest Standard (2 Vw)    Result Date: 2/18/2020  No acute infiltrate    The study was dictated by Quincy Valenzuela PA-C and Patel Denny MD reviewed and concurred with the findings. Xr Shoulder Right 1 Vw    Result Date: 2/21/2020  1. Satisfactory appearance of new right shoulder prosthesis on single view. 2. Resection of the distal clavicle. Discharge Plan   Disposition: Home    Provider Follow-Up:   DO Ines Gomes [address] [place] [address], [address] [place] [address] [address] [place] [phone]    In 2 weeks  for postop follow up. call office to confirm appointment time. Hospital/Incidental Findings Requiring Follow-Up:  none    Patient Instructions   Diet: regular diet    Activity: maintain sling. NWB RUE. Activity as per discharge instructions. Other Instructions:   As per discharge instructions    Discharge Medications         Medication List      START taking these medications    aspirin 325 MG EC tablet  Take 1 tablet by mouth 2 times daily  Replaces:  aspirin 81 MG tablet     docusate sodium 100 MG capsule  Commonly known as:  COLACE  Take 1 capsule by mouth 2 times daily as needed for Constipation     oxyCODONE 5 MG immediate release tablet  Commonly known as:  ROXICODONE  Take 1-2 tablets by mouth every 4 hours as needed for Pain for up to 7 days.         CHANGE how you take these medications    fluticasone-umeclidin-vilant 100-62.5-25 MCG/INH Aepb  Commonly known as:  TRELEGY ELLIPTA  Inhale 1 puff into the lungs daily  What changed:  additional instructions        CONTINUE taking these medications    albuterol sulfate  (90 Base) MCG/ACT inhaler  Commonly known as:  ProAir HFA  INHALE TWO PUFFS BY MOUTH FOUR TIMES A DAY AS NEEDED     BIOTIN PO     citalopram 40 MG tablet  Commonly known as:  CELEXA  Take 1 tablet by mouth daily Instructed to take am of procedure CoQ-10 10 MG Caps     fluticasone 50 MCG/ACT nasal spray  Commonly known as:  FLONASE  1 spray by Each Nostril route daily     gabapentin 100 MG capsule  Commonly known as:  NEURONTIN  Take 1 capsule by mouth nightly for 360 days.      glimepiride 1 MG tablet  Commonly known as:  AMARYL  Take 1 tablet by mouth 2 times daily (before meals)     hydrochlorothiazide 12.5 MG capsule  Commonly known as:  MICROZIDE  Take 1 capsule by mouth daily     ipratropium-albuterol 0.5-2.5 (3) MG/3ML Soln nebulizer solution  Commonly known as:  DUONEB     levothyroxine 50 MCG tablet  Commonly known as:  SYNTHROID  Take 1 tablet by mouth Daily     loratadine 10 MG capsule  Commonly known as:  CLARITIN     metFORMIN 500 MG tablet  Commonly known as:  GLUCOPHAGE  Take 2 tablets by mouth 2 times daily (with meals) Take 2 tablets 2 times a day     TURMERIC PO     Vitamin D3 50 MCG (2000 UT) Caps        STOP taking these medications    aspirin 81 MG tablet  Replaced by:  aspirin 325 MG EC tablet     celecoxib 200 MG capsule  Commonly known as:  CELEBREX     Handicap Placard Misc     ondansetron 4 MG tablet  Commonly known as:  ZOFRAN     therapeutic multivitamin-minerals tablet     traMADol 50 MG tablet  Commonly known as:  ULTRAM           Where to Get Your Medications      These medications were sent to [facility], [address] [place] [address]    Phone:  [phone]   · aspirin 325 MG EC tablet  · docusate sodium 100 MG capsule     You can get these medications from any pharmacy    Bring a paper prescription for each of these medications  · oxyCODONE 5 MG immediate release tablet         Electronically signed by Greer Marquez PA-C on 2/22/20 at 9:04 AM

## 2020-02-22 NOTE — PROGRESS NOTES
Occupational Therapy  OCCUPATIONAL THERAPY INITIAL EVALUATION      Date:2020  Patient Name: Gita Maldonado  MRN: [id number]  : 1955  Room: [address] [place], [place] [address]    Referring Provider: Connor Farley MD    Evaluating OT: Bhavik Tamayo OTR/L [id number]    AM-PAC Daily Activity Raw Score:     Recommended Adaptive Equipment: TBD    Diagnosis: Reverse R shoulder arthroplasty   Surgery: 20 R reverse total shoulder arthroplasty   Pertinent Medical History: DM, COPD, asthma   Precautions:  Falls, NWB R UE, No SH ROM, OK elbow to digits     Home Living: Pt lives with  and children in a bilevel home 4 steps between levels. Bathroom setup: tub/shower combo with grab bars and indwelling shower chair, standard commode     Prior Level of Function: Independent with ADLs, Independent with IADLs; completed functional mobility no AD. 2L O2 at home. Pain Level: moderate pain in R shoulder    Cognition: A&O: /. Pt is alert and oriented. Easily distracted and perseverates on \"her way\" of figuring things out limiting follow through of precautions and safe technique   Problem solving: fair   Judgement/safety: fair     Functional Assessment:   Initial Eval Status  Date: 20 Treatment session:  Short Term Goals  Treatment frequency: 2-5x/wk PRN x1-3 wks     Feeding Set up  Holding glass of water to drink     Grooming Min A  Standing sink level for hand hygiene and mouth care  Cues for use of L hand during tasks and avoiding reaching out of sling with R UE  Mod I   UB Dressing Initially: Max A  Post training: Min A  Pt impulsive and attempting to remove shoulder sling her own way resulting in movement of R shoulder. Max cues to terminate task and max education on why her technique is not safe with max increased time to show pt correct donning/doffing of sling to maintain safety at R shoulder.  OT educates patient on how family member can safely assist in donning/doffing sling to decrease struggle for patient with verbalized understanding. Education on donning overhead large shirt with mod cues initially to maintain safety of R shoulder, with increased time and cues pt able to demonstrate understanding of proper technique and safety for donning/doffing large overhead shirts while maintaining R shoulder in neutral with no movement. Mod I   LB Dressing Min A  Donning brief and pants  Total assist for donning socks  Able to manage threading pants with L handed technique post instruction and increased time for task completion  Pt reports her daughter will assist her at home  Mod I    Bathing Mod A  Sponge bathing sitting/standing sink level  Step by step cues for compensatory techniques to maintain R shoulder precautions with initially frequent attempts to mobilize R UE max cues to stop and follow instruction. By the end of bathing pt does demonstrate increased follow through of proper technique and is able to report her daughter can assist her as needed  Education on sponge bathing initially upon return home for safety and only attempting tub transfer with family present and use of grab bar on L UE with R shoulder sling in place to prevent attempts to utilize R UE to assist with verbalized understanding  Mod I   Toileting Min A  Education on compensatory techniques for jhon care with L UE demonstrates increased difficulty initially but then able to follow instruction and complete task. Pt requires min assist for management of brief and pants up over hips. Education on compensatory techniques for use of L UE with increased difficulty this session.  Pt initially attempting to utilize R UE reaching out of sling but with max cues and increased time pt is able to only utilize L UE for completion of task  Mod I   Bed Mobility  Supine to sit: SBA  Sit to Supine: SBA     Functional Transfers STS: SBA  Mod I   Functional Mobility SBA with no AD  Household distance  Mod I during ADLs   Balance Sitting: fair    Standing: fair no AD Activity Tolerance Fair minus  standing carmella x6-7 min with fair plus balance during self care tasks   R UE Pt trained on 1 handed techniques, R UE precautions, sling management, edema management, 1 set x6 reps wrist and digit ROM and gentle elb flex/ext with shoulder in neutral. Increased time required for follow through of all instruction. Education on positioning of support under sling while at rest for comfort and positioning with good follow through  Pt will be Mod I in follow through of all UE precautions, sling/edema management, ROM and HEP in preparation for maximum independence in all self care tasks. Treatment: Patient educated on techniques for completion of ADL, safe functional transfers and functional mobility. Patient required cues for follow through with proper hand/foot placement, pacing, safety, precautions and technique in bed mobility, functional transfers, functional mobility, toileting, bathing, grooming, UB dressing, self feeding and LB dressing in preparation for maximum independence in all self care tasks. Hand Dominance: Right [x]  Left []   Strength ROM Additional Info:    RUE  NT WFL elbow to digits. R sh NT    LUE 4/5 WFL good  and FMC/dexterity noted during ADL tasks         Hearing: WFL   Vision: WFL   Sensation:  No c/o numbness or tingling   Tone: WFL   Edema: none                             Long Term Goal (1-3 wks): Pt will maximize functional performance in all self care tasks/functional transfers with good follow through of all trained techniques for safe transition to next level of care    Eval Complexity: Low    Assessment of current deficits   Functional mobility [x]  ADLs [x] Strength [x]  Cognition []  Functional transfers  [x] IADLs [x] Safety Awareness [x]  Endurance [x]  Fine Motor Coordination [] Balance [x] Vision/perception [] Sensation []   Gross Motor Coordination [] ROM [x] Delirium []                  Motor Control []    Plan of Care:    ADL retraining [x]   Equipment needs [x]   Neuromuscular re-education [x] Energy Conservation Techniques [x]  Functional Transfer training [x] Patient and/or Family Education [x]  Functional Mobility training [x]  Environmental Modifications [x]  Cognitive re-training []   Compensatory techniques for ADLs [x]  Splinting Needs []   Positioning to improve overall function [x]   Therapeutic Activity [x]  Therapeutic Exercise  [x]  Visual/Perceptual: []    Delirium prevention/treatment  []   Other:  []    Rehab Potential: Good for established goals     Patient / Family Goal: To get home. Patient and/or family were instructed on functional diagnosis, prognosis/goals and OT plan of care. Pt verbalized understanding. Upon arrival, patient supine in bed. At end of session, patient supine in bed per pt request with call light and phone within reach, all lines and tubes intact. Pt would benefit from continued skilled OT to increase safety and independence with completion of ADL/IADL tasks for functional independence and quality of life.     Low Evaluation + 40 timed treatment minutes  Treatment Time In:1015   Treatment Time Out: 0024   Treatment Charges: Mins Units    ADL/Home Mgt [id number] 2    Thera Activities [id number] 1    Ther Ex 05593      Manual Therapy 83260      Neuro Re-ed 21440      Orthotic manage/training  55117      Non Billable Time      Total Timed Treatment 40 3        Evaluation time includes thorough review of current medical information, gathering information on past medical history/social history and prior level of function, completion of standardized testing/informal observation of tasks, assessment of data, and development of POC/Goals    Feroz Paredes OTR/L  [id number]

## 2020-02-22 NOTE — PLAN OF CARE
Problem: Pain:  Goal: Pain level will decrease  Description  Pain level will decrease  2/22/2020 1052 by Jorge Naylor RN  Outcome: Met This Shift  2/21/2020 2153 by Lizzie Garcia RN  Outcome: Met This Shift     Problem: Falls - Risk of:  Goal: Will remain free from falls  Description  Will remain free from falls  2/22/2020 1052 by Jorge Naylor RN  Outcome: Met This Shift  2/21/2020 2153 by Lizzie Garcia RN  Outcome: Met This Shift     Problem: Gas Exchange - Impaired:  Goal: Levels of oxygenation will improve  Description  Levels of oxygenation will improve  2/22/2020 1052 by Jorge Naylor RN  Outcome: Met This Shift  2/21/2020 2153 by Lizzie Garcia RN  Outcome: Met This Shift     Problem: Infection - Surgical Site:  Goal: Will show no infection signs and symptoms  Description  Will show no infection signs and symptoms  Outcome: Met This Shift

## 2020-02-22 NOTE — CONSULTS
[facility]       Reason for Consult:  Medical management   Informant(s) for H&P: Pt and chart     History of Present Illness:    Lukas Mann is a 62 y.o. female with past medical history of right shoulder osteoarthritis who was admitted earlier for elective right reverse total shoulder arthroplasty. Patient had the surgery already and she has her right arm in sling. She still has shoulder pain receiving oxycodone and Dilaudid as needed. · Denied any fever, nausea, vomiting, diarrhea, blood in stool or black stool. · Denied any chest pain, no recent lightheadedness or syncope     Internal medicine was consulted for medical management, patient has history of COPD, essential hypertension, and diabetes not on insulin. REVIEW OF SYSTEMS:  A comprehensive review of systems was negative except for: what is in the HPI    PMH:  Past Medical History:   Diagnosis Date    Arthritis     Asthma     COPD (chronic obstructive pulmonary disease) [facility]     uses O2 NC 2 L continuous     Depression     Diabetes mellitus ([facility] [facility]     Edema leg     Hypertension     Hypothyroidism     Leukocytosis     Obesity     Osteoarthritis     Sleep apnea     no CPAP     Thyroid disease     Type 2 diabetes mellitus without complication ([facility] [address]        Surgical History:  Past Surgical History:   Procedure Laterality Date    CARPAL TUNNEL RELEASE Left 7/1/2019    LEFT CARPAL TUNNEL RELEASE performed [facility] Orlando [facility]on [facility]    DILATION AND CURETTAGE OF UTERUS      HERNIA REPAIR      ROTATOR CUFF REPAIR Right     TONSILLECTOMY AND ADENOIDECTOMY      TOTAL KNEE ARTHROPLASTY Left     TUBAL LIGATION         Medications Prior to Admission:    Prior to Admission medications    Medication Sig Start Date End Date Taking?  Authorizing Provider   fluticasone-umeclidin-vilant (TRELEGY ELLIPTA) 100-62.5-25 MCG/INH AEPB Inhale 1 puff into the lungs daily  Patient taking differently: Inhale 1 puff into the lungs daily Instructed to take am of procedure 1/15/20 1/9/21 Yes Stevie Mon MD   metFORMIN (GLUCOPHAGE) 500 MG tablet Take 2 tablets by mouth 2 times daily (with meals) Take 2 tablets 2 times a day 10/23/19  Yes Adi Sesay DO   TURMERIC PO Take by mouth 2 times daily LD 2-14-20   Yes Historical Provider, MD   BIOTIN PO Take by mouth daily LD 2-14-20   Yes Historical Provider, MD   fluticasone (FLONASE) 50 MCG/ACT nasal spray 1 spray by Each Nostril route daily 9/4/19  Yes Adi Sesay DO   levothyroxine (SYNTHROID) 50 MCG tablet Take 1 tablet by mouth Daily 9/4/19  Yes Adi Sesay DO   hydrochlorothiazide (MICROZIDE) 12.5 MG capsule Take 1 capsule by mouth daily 9/4/19  Yes Adi Sesay DO   citalopram (CELEXA) 40 MG tablet Take 1 tablet by mouth daily Instructed to take am of procedure 9/4/19  Yes Adi Sesay DO   glimepiride (AMARYL) 1 MG tablet Take 1 tablet by mouth 2 times daily (before meals) 9/4/19  Yes Adi Sesay DO   albuterol sulfate HFA (PROAIR HFA) 108 (90 Base) MCG/ACT inhaler INHALE TWO PUFFS BY MOUTH FOUR TIMES A DAY AS NEEDED 9/4/19  Yes Adi Sesay DO   gabapentin (NEURONTIN) 100 MG capsule Take 1 capsule by mouth nightly for 360 days. 9/4/19 8/29/20 Yes Adi Sesay DO   traMADol (ULTRAM) 50 MG tablet Take 50 mg by mouth 4 times daily.    Yes Historical Provider, MD   loratadine (CLARITIN) 10 MG capsule Take 10 mg by mouth daily   Yes Historical Provider, MD   Multiple Vitamins-Minerals (THERAPEUTIC MULTIVITAMIN-MINERALS) tablet Take 1 tablet by mouth daily Ld 2-14-20   Yes Historical Provider, MD   Cholecalciferol (VITAMIN D3) 2000 units CAPS Take 5,000 Units by mouth daily Ld 2-14-20   Yes Historical Provider, MD   Coenzyme Q10 (COQ-10) 10 MG CAPS Take by mouth daily Ld 2-14-20   Yes Historical Provider, MD   aspirin 81 MG tablet Take 81 mg by mouth daily Ld 2-14-20   Yes Historical Provider, MD   celecoxib (CELEBREX) 200 MG capsule Take 100 mg by mouth daily To start after surgery scheduled 2-21-20    Historical Provider, MD   Handicap Placard MISC by Does not apply route Copd      5 yrs 2/14/20   Chin Sesay,    Handicap Placard MISC by Does not apply route Copd      5 yrs 8/30/19   Mike Sesay, DO   ondansetron (ZOFRAN) 4 MG tablet Take 1 tablet by mouth every 8 hours as needed for Nausea or Vomiting  Patient not taking: Reported on 8/14/2019 7/1/19   Kelvin Hairston PA-C   ipratropium-albuterol (DUONEB) 0.5-2.5 (3) MG/3ML SOLN nebulizer solution Inhale 1 vial into the lungs every 4 hours as needed for Shortness of Breath Instructed to take am of procedure if needed    Historical Provider, MD       Allergies:    Macrobid [nitrofurantoin macrocrystal]    Social History:    reports that she quit smoking about 2 years ago. She has a 30.00 pack-year smoking history. She has never used smokeless tobacco. She reports current alcohol use. She reports that she does not use drugs. Family History:   family history includes Diabetes Type 1  in her mother; Heart Attack (age of onset: 79) in her father; Stroke in her mother.     PHYSICAL EXAM:  Vitals:  BP (!) 150/69   Pulse 91   Temp 98.6 °F (37 °C) (Oral)   Resp 18   Ht 4' 10\" (1.473 m)   Wt 209 lb (94.8 kg)   SpO2 94%   BMI 43.68 kg/m²   General Appearance: AOx3 and NAD  Skin: Warm and dry  Head: Normocephalic and atraumatic, mucous membranes well hydrated   Eyes: Pupils equal, round, and reactive to light  Neck: Supple and non tender without mass   Pulmonary/Chest: Clear to auscultation bilaterally- no wheezes, no crackle, not in respiratory distress  Cardiovascular: Normal rate, normal S1 and S2 and no carotid bruits  Abdomen: Soft, non-tender, non-distended, no rebound, no rigidity, + bowel sounds  Extremities: No cyanosis, no clubbing and no edema right shoulder in sling   Neurologic: No neurologic focal deficits, speech is normal, coherent LABS:  CBC  No results for input(s): WBC, HGB, HCT, MCV, PLT in the last 72 hours. BMP  No results for input(s): NA, K, CL, CO2, BUN, CREATININE, LABGLOM, GLUCOSE, CALCIUM in the last 72 hours. Invalid input(s): GLU  No results found for: MG, PHOS  LFT  No results for input(s): ALT, AST, ALKPHOS, BILITOT, BILIDIR in the last 72 hours. Albumin  Lab Results   Component Value Date    LABALBU 4.4 08/29/2019     Lactic acid   No results for input(s): LACTSEPSIS in the last 72 hours. Cardiac  Troponin No results found for: TROPONINI  Pro-BNP No results found for: PROBNP  Iron studies  No results found for: FERRITIN, IRON, TIBC    Radiology:     XR Shoulder Right 1 VW   Final Result      1. Satisfactory appearance of new right shoulder prosthesis on single   view. 2. Resection of the distal clavicle. ASSESSMENT and PLAN:      Principal Problem:    S/P reverse total shoulder arthroplasty, right  Active Problems:    Primary osteoarthritis of right shoulder    Essential hypertension    Pulmonary emphysema (HCC)    Type 2 diabetes mellitus without complication, without long-term current use of insulin (HCC)  Resolved Problems:    * No resolved hospital problems. *       Continue with breathing treatments nebulizers   Continue Pulmicort   Continue with citalopram   Continue with gabapentin   Continue hydrochlorothiazide   Hold oral antidiabetic medications   Start insulin sliding scale medium        PLEASE NOTE:    This report was transcribed using typing and voice recognition software. Every effort was made to ensure accuracy; however, inadvertent computerized transcription errors may be present.  More than 50 minutes have been spent in evaluating the patient, reviewing records and results, discussing with staff / family and other treating physicians.     Isreal Roche MD, MSc   [facility]

## 2020-09-04 RX ORDER — CITALOPRAM 40 MG/1
40 TABLET ORAL DAILY
Qty: 90 TABLET | Refills: 5 | Status: SHIPPED
Start: 2020-09-04 | End: 2020-10-31 | Stop reason: SDUPTHER

## 2020-10-22 NOTE — PLAN OF CARE
Problem: Pain:  Goal: Pain level will decrease  Description  Pain level will decrease  Outcome: Met This Shift     Problem: Pain:  Goal: Control of acute pain  Description  Control of acute pain  Outcome: Met This Shift     Problem: Falls - Risk of:  Goal: Will remain free from falls  Description  Will remain free from falls  Outcome: Met This Shift     Problem: Gas Exchange - Impaired:  Goal: Levels of oxygenation will improve  Description  Levels of oxygenation will improve  Outcome: Met This Shift no

## 2020-10-28 ENCOUNTER — HOSPITAL ENCOUNTER (OUTPATIENT)
Age: 65
Discharge: HOME | End: 2020-10-28
Payer: COMMERCIAL

## 2020-10-28 LAB
ALBUMIN SERPL-MCNC: 3.9 G/DL (ref 3.5–5.2)
ALP BLD-CCNC: 135 U/L (ref 35–104)
ALT SERPL-CCNC: 14 U/L (ref 0–32)
ANION GAP SERPL CALCULATED.3IONS-SCNC: 12 MMOL/L (ref 7–16)
AST SERPL-CCNC: 20 U/L (ref 0–31)
BACTERIA: ABNORMAL /HPF
BASOPHILS ABSOLUTE: 0.09 E9/L (ref 0–0.2)
BASOPHILS RELATIVE PERCENT: 0.8 % (ref 0–2)
BILIRUB SERPL-MCNC: 0.3 MG/DL (ref 0–1.2)
BILIRUBIN URINE: NEGATIVE
BLOOD, URINE: NEGATIVE
BUN BLDV-MCNC: 18 MG/DL (ref 8–23)
CALCIUM SERPL-MCNC: 9.9 MG/DL (ref 8.6–10.2)
CHLORIDE BLD-SCNC: 96 MMOL/L (ref 98–107)
CHOLESTEROL, TOTAL: 173 MG/DL (ref 0–199)
CLARITY: CLEAR
CO2: 25 MMOL/L (ref 22–29)
COLOR: YELLOW
CREAT SERPL-MCNC: 1.2 MG/DL (ref 0.5–1)
EOSINOPHILS ABSOLUTE: 0.37 E9/L (ref 0.05–0.5)
EOSINOPHILS RELATIVE PERCENT: 3.1 % (ref 0–6)
EPITHELIAL CELLS, UA: ABNORMAL /HPF
GFR AFRICAN AMERICAN: 55
GFR NON-AFRICAN AMERICAN: 45 ML/MIN/1.73
GLUCOSE BLD-MCNC: 298 MG/DL (ref 74–99)
GLUCOSE URINE: 250 MG/DL
HBA1C MFR BLD: 11.1 % (ref 4–5.6)
HCT VFR BLD CALC: 43.4 % (ref 34–48)
HDLC SERPL-MCNC: 40 MG/DL
HEMOGLOBIN: 13.9 G/DL (ref 11.5–15.5)
IMMATURE GRANULOCYTES #: 0.17 E9/L
IMMATURE GRANULOCYTES %: 1.4 % (ref 0–5)
KETONES, URINE: ABNORMAL MG/DL
LDL CHOLESTEROL CALCULATED: 77 MG/DL (ref 0–99)
LEUKOCYTE ESTERASE, URINE: ABNORMAL
LYMPHOCYTES ABSOLUTE: 2.65 E9/L (ref 1.5–4)
LYMPHOCYTES RELATIVE PERCENT: 22.2 % (ref 20–42)
MCH RBC QN AUTO: 26 PG (ref 26–35)
MCHC RBC AUTO-ENTMCNC: 32 % (ref 32–34.5)
MCV RBC AUTO: 81.3 FL (ref 80–99.9)
MICROALBUMIN UR-MCNC: 12.1 MG/L
MONOCYTES ABSOLUTE: 0.91 E9/L (ref 0.1–0.95)
MONOCYTES RELATIVE PERCENT: 7.6 % (ref 2–12)
NEUTROPHILS ABSOLUTE: 7.74 E9/L (ref 1.8–7.3)
NEUTROPHILS RELATIVE PERCENT: 64.9 % (ref 43–80)
NITRITE, URINE: NEGATIVE
PDW BLD-RTO: 14.5 FL (ref 11.5–15)
PH UA: 5.5 (ref 5–9)
PLATELET # BLD: 318 E9/L (ref 130–450)
PMV BLD AUTO: 11.9 FL (ref 7–12)
POTASSIUM SERPL-SCNC: 4.9 MMOL/L (ref 3.5–5)
PROTEIN UA: NEGATIVE MG/DL
RBC # BLD: 5.34 E12/L (ref 3.5–5.5)
RBC UA: ABNORMAL /HPF (ref 0–2)
SODIUM BLD-SCNC: 133 MMOL/L (ref 132–146)
SPECIFIC GRAVITY UA: >=1.03 (ref 1–1.03)
T4 TOTAL: 9.8 MCG/DL (ref 4.5–11.7)
TOTAL PROTEIN: 7.7 G/DL (ref 6.4–8.3)
TRIGL SERPL-MCNC: 281 MG/DL (ref 0–149)
TSH SERPL DL<=0.05 MIU/L-ACNC: 3.74 UIU/ML (ref 0.27–4.2)
URIC ACID, SERUM: 3.5 MG/DL (ref 2.4–5.7)
UROBILINOGEN, URINE: 0.2 E.U./DL
VITAMIN D 25-HYDROXY: 94 NG/ML (ref 30–100)
VLDLC SERPL CALC-MCNC: 56 MG/DL
WBC # BLD: 11.9 E9/L (ref 4.5–11.5)
WBC UA: ABNORMAL /HPF (ref 0–5)

## 2020-10-28 PROCEDURE — 80053 COMPREHEN METABOLIC PANEL: CPT

## 2020-10-28 PROCEDURE — 85025 COMPLETE CBC W/AUTO DIFF WBC: CPT

## 2020-10-28 PROCEDURE — 84436 ASSAY OF TOTAL THYROXINE: CPT

## 2020-10-28 PROCEDURE — 82044 UR ALBUMIN SEMIQUANTITATIVE: CPT

## 2020-10-28 PROCEDURE — 36415 COLL VENOUS BLD VENIPUNCTURE: CPT

## 2020-10-28 PROCEDURE — 84443 ASSAY THYROID STIM HORMONE: CPT

## 2020-10-28 PROCEDURE — 82306 VITAMIN D 25 HYDROXY: CPT

## 2020-10-28 PROCEDURE — 81001 URINALYSIS AUTO W/SCOPE: CPT

## 2020-10-28 PROCEDURE — 84550 ASSAY OF BLOOD/URIC ACID: CPT

## 2020-10-28 PROCEDURE — 80061 LIPID PANEL: CPT

## 2020-10-28 PROCEDURE — 83036 HEMOGLOBIN GLYCOSYLATED A1C: CPT

## 2020-10-30 ENCOUNTER — OFFICE VISIT (OUTPATIENT)
Dept: PRIMARY CARE CLINIC | Age: 65
End: 2020-10-30
Payer: COMMERCIAL

## 2020-10-30 VITALS
RESPIRATION RATE: 20 BRPM | SYSTOLIC BLOOD PRESSURE: 134 MMHG | OXYGEN SATURATION: 98 % | DIASTOLIC BLOOD PRESSURE: 82 MMHG | BODY MASS INDEX: 42.22 KG/M2 | WEIGHT: 202 LBS | TEMPERATURE: 97 F | HEART RATE: 115 BPM

## 2020-10-30 PROCEDURE — 90471 IMMUNIZATION ADMIN: CPT | Performed by: FAMILY MEDICINE

## 2020-10-30 PROCEDURE — 99214 OFFICE O/P EST MOD 30 MIN: CPT | Performed by: FAMILY MEDICINE

## 2020-10-30 PROCEDURE — 90686 IIV4 VACC NO PRSV 0.5 ML IM: CPT | Performed by: FAMILY MEDICINE

## 2020-10-30 PROCEDURE — 90732 PPSV23 VACC 2 YRS+ SUBQ/IM: CPT | Performed by: FAMILY MEDICINE

## 2020-10-30 PROCEDURE — 90472 IMMUNIZATION ADMIN EACH ADD: CPT | Performed by: FAMILY MEDICINE

## 2020-10-30 ASSESSMENT — ENCOUNTER SYMPTOMS
ALLERGIC/IMMUNOLOGIC NEGATIVE: 1
EYES NEGATIVE: 1
GASTROINTESTINAL NEGATIVE: 1

## 2020-10-30 NOTE — PROGRESS NOTES
10/30/20  Name: Leonides Jose    : 1955    Sex: female    Age: 60 y.o. Ck  Re   diab  thryoid lab arhtirtis  Subjective:  Chief Complaint: Patient is here for   Ck  Re   diab  thryoid lab arhtirtis    Feel ok  No  Cp o r sob          Had shoudelr  Or done  She faield to get PT done du e to covid    Lab ntoed         ghb up very high    She not ck bs at all    Non comp    ghb   Nigh      gfr dec 45. Does not watch her diet at home. Review of Systems   Constitutional: Negative. HENT: Negative. Eyes: Negative. Respiratory: Positive for shortness of breath (Short of breath with exertion but no change). Cardiovascular: Negative. Gastrointestinal: Negative. Endocrine: Negative. Genitourinary: Negative. Musculoskeletal: Positive for arthralgias. Hpi   Skin: Negative. Allergic/Immunologic: Negative. Neurological: Negative. Hematological: Negative. Psychiatric/Behavioral: Negative. Current Outpatient Medications:     citalopram (CELEXA) 40 MG tablet, Take 1 tablet by mouth daily Instructed to take am of procedure, Disp: 90 tablet, Rfl: 5    metFORMIN (GLUCOPHAGE) 500 MG tablet, Take 2 tablets by mouth 2 times daily (with meals) Take 2 tablets 2 times a day, Disp: 360 tablet, Rfl: 5    fluticasone (FLONASE) 50 MCG/ACT nasal spray, 1 spray by Each Nostril route daily, Disp: 1 Bottle, Rfl: 12    levothyroxine (SYNTHROID) 50 MCG tablet, Take 1 tablet by mouth Daily, Disp: 90 tablet, Rfl: 5    hydroCHLOROthiazide (MICROZIDE) 12.5 MG capsule, Take 1 capsule by mouth daily, Disp: 90 capsule, Rfl: 5    glimepiride (AMARYL) 1 MG tablet, Take 1 tablet by mouth 2 times daily (before meals), Disp: 180 tablet, Rfl: 12    albuterol sulfate HFA (PROAIR HFA) 108 (90 Base) MCG/ACT inhaler, INHALE TWO PUFFS BY MOUTH FOUR TIMES A DAY AS NEEDED, Disp: 25.5 g, Rfl: 12    gabapentin (NEURONTIN) 100 MG capsule, Take 1 capsule by mouth nightly for 360 days. , Disp: 90 capsule, Rfl: 3    insulin glargine (LANTUS SOLOSTAR) 100 UNIT/ML injection pen, Inject 10 Units into the skin nightly, Disp: 5 pen, Rfl: 12    insulin aspart (NOVOLOG FLEXPEN) 100 UNIT/ML injection pen, Less than 120 no insulin, 121 -200 take 10 units, 201-300 take 15 units, over 301 take 20 units--- before each meal, Disp: 5 pen, Rfl: 3    Insulin Pen Needle (PEN NEEDLES 5/16\") 30G X 8 MM MISC, 1 each by Does not apply route daily Insulin 4 times a day. May change to what ever type of needle is available, Disp: 100 each, Rfl: 3    blood glucose test strips (ASCENSIA AUTODISC VI;ONE TOUCH ULTRA TEST VI) strip, 1 each by In Vitro route 2 times daily As needed. , Disp: 100 each, Rfl: 12    aspirin 325 MG EC tablet, Take 1 tablet by mouth 2 times daily, Disp: 84 tablet, Rfl: 0    docusate sodium (COLACE) 100 MG capsule, Take 1 capsule by mouth 2 times daily as needed for Constipation, Disp: 30 capsule, Rfl: 1    fluticasone-umeclidin-vilant (TRELEGY ELLIPTA) 100-62.5-25 MCG/INH AEPB, Inhale 1 puff into the lungs daily (Patient taking differently: Inhale 1 puff into the lungs daily Instructed to take am of procedure), Disp: 3 each, Rfl: 3    TURMERIC PO, Take by mouth 2 times daily LD 2-14-20, Disp: , Rfl:     BIOTIN PO, Take by mouth daily LD 2-14-20, Disp: , Rfl:     ipratropium-albuterol (DUONEB) 0.5-2.5 (3) MG/3ML SOLN nebulizer solution, Inhale 1 vial into the lungs every 4 hours as needed for Shortness of Breath Instructed to take am of procedure if needed, Disp: , Rfl:     loratadine (CLARITIN) 10 MG capsule, Take 10 mg by mouth daily, Disp: , Rfl:     Cholecalciferol (VITAMIN D3) 2000 units CAPS, Take 5,000 Units by mouth daily Ld 2-14-20, Disp: , Rfl:     Coenzyme Q10 (COQ-10) 10 MG CAPS, Take by mouth daily Ld 2-14-20, Disp: , Rfl:   Allergies   Allergen Reactions    Macrobid [Nitrofurantoin Macrocrystal] Other (See Comments)     Exacerbates asthma     Social History     Socioeconomic History  Marital status:      Spouse name: Not on file    Number of children: Not on file    Years of education: Not on file    Highest education level: Not on file   Occupational History    Not on file   Social Needs    Financial resource strain: Not on file    Food insecurity     Worry: Not on file     Inability: Not on file    Transportation needs     Medical: Not on file     Non-medical: Not on file   Tobacco Use    Smoking status: Former Smoker     Packs/day: 1.00     Years: 30.00     Pack years: 30.00     Last attempt to quit: 05/2017     Years since quitting: 3.5    Smokeless tobacco: Never Used   Substance and Sexual Activity    Alcohol use: Yes     Comment: rare    Drug use: Never    Sexual activity: Not on file   Lifestyle    Physical activity     Days per week: Not on file     Minutes per session: Not on file    Stress: Not on file   Relationships    Social connections     Talks on phone: Not on file     Gets together: Not on file     Attends Mosque service: Not on file     Active member of club or organization: Not on file     Attends meetings of clubs or organizations: Not on file     Relationship status: Not on file    Intimate partner violence     Fear of current or ex partner: Not on file     Emotionally abused: Not on file     Physically abused: Not on file     Forced sexual activity: Not on file   Other Topics Concern    Not on file   Social History Narrative        OBESITY    SMOKER----QUIT 11-09------BACK------ QUIT 2-18    [facility] CAD    LEFT SDIED [address] [facility] [address] [facility] [address] Saroj Valdez 2006    OA KNEES    EDEMA LEGS    FH DM    LEUKOCYTOSIS--DR BARGER IN PAST--THEN DR BOWMAN 2005    DEPRESSION    NIDDM    HTN    ELEV CRP    EARLY HYPOTHYROID    ASTHMA    SLEEP APNEA------C-PAP    D AND C 1-09----DR PALMA    TORN MENISCUS L KNEE OR 2000 DR DIEHL------SEVERE OA L KNEE ON X RAY 9-11    EMG 9-14 SEVERE CTS L MOD-SEV R---REFER TO DR STEPHENSON    INJECTIONS KNEE DR GTZ    LEFT TOTAL KNEE OR 2-15 DR GTZ--    R CARPAL TUNNEL SURGEYR 3-15 DR GTZ    VITROUS SEPARATION LEFT EYE 11-15    R SHOULDER OR TORN ROTATOR CUFF 6-16 DR SURESH---PT NOT HAPPY    PT MOVED BACK HOME 2-17    VENTRAL HERNIA OR DR Otto Holliday 9-17    EVAL DR MARROQUIN 8-18 COPD ADDED O2    RETIRED 9-18 DISABILITY 1-19    Left carpal tunnel  7-19 surgery    Right total shoulder arthroplasty   Dr Espinoza [place]      Past Medical History:   Diagnosis Date    Arthritis     Asthma     COPD (chronic obstructive pulmonary disease) ([address]     uses O2 NC 2 L continuous     Depression     Diabetes mellitus ([address]     Edema leg     Hypertension     Hypothyroidism     Leukocytosis     Obesity     Osteoarthritis     Sleep apnea     no CPAP     Thyroid disease     Type 2 diabetes mellitus without complication ([address]      Family History   Problem Relation Age of Onset    Stroke Mother     Diabetes Type 1  Mother     Heart Attack Father 79      Past Surgical History:   Procedure Laterality Date    CARPAL TUNNEL RELEASE Left 7/1/2019    LEFT CARPAL TUNNEL RELEASE performed by [facility] 3    DILATION AND CURETTAGE OF UTERUS      HERNIA REPAIR      ROTATOR CUFF REPAIR Right     SHOULDER SURGERY Right 2/21/2020    RIGHT SHOULDER TOTAL ARTHROPLASTY REVERSE WITH ISB performed by Jayde Penaloza MD at [address] Left     TUBAL LIGATION        Vitals:    10/30/20 1321   BP: 134/82   Pulse: 115   Resp: 20   Temp: 97 °F (36.1 °C)   TempSrc: Temporal   SpO2: 98%   Weight: 202 lb (91.6 kg)       Objective:    Physical Exam  Vitals signs reviewed. Constitutional:       Appearance: She is well-developed. She is obese. HENT:      Head: Normocephalic. Eyes:      Pupils: Pupils are equal, round, and reactive to light. Neck:      Musculoskeletal: Normal range of motion.    Cardiovascular:      Rate and Rhythm: Normal rate and regular rhythm. Pulmonary:      Effort: Pulmonary effort is normal.      Breath sounds: Wheezing (Faint wheezing in the bases. ) present. Abdominal:      Palpations: Abdomen is soft. Musculoskeletal:      Comments: Marked  Dec rom right shoudler   Skin:     General: Skin is warm. Neurological:      Mental Status: She is alert and oriented to person, place, and time. Psychiatric:         Behavior: Behavior normal.         Iban Mccabe was seen today for discuss labs. Diagnoses and all orders for this visit:    Type 2 diabetes mellitus without complication, with long-term current use of insulin ([facility])  -     metFORMIN (GLUCOPHAGE) 500 MG tablet; Take 2 tablets by mouth 2 times daily (with meals) Take 2 tablets 2 times a day  -     glimepiride (AMARYL) 1 MG tablet; Take 1 tablet by mouth 2 times daily (before meals)  -     insulin glargine (LANTUS SOLOSTAR) 100 UNIT/ML injection pen; Inject 10 Units into the skin nightly  -     insulin aspart (NOVOLOG FLEXPEN) 100 UNIT/ML injection pen; Less than 120 no insulin, 121 -200 take 10 units, 201-300 take 15 units, over 301 take 20 units--- before each meal  -     Insulin Pen Needle (PEN NEEDLES 5/16\") 30G X 8 MM MISC; 1 each by Does not apply route daily Insulin 4 times a day. May change to what ever type of needle is available    Need for influenza vaccination  -     INFLUENZA, QUADV, 6 MO AND OLDER, IM, PF, PREFILL SYR, 0.5ML (FLUARIX QUADV, PF)    Need for pneumococcal vaccination  -     PNEUMOVAX 23 subcutaneous/IM (Pneumococcal polysaccharide vaccine 23-valent >= 3yo)    Essential hypertension  -     hydroCHLOROthiazide (MICROZIDE) 12.5 MG capsule;  Take 1 capsule by mouth daily    Pulmonary emphysema, unspecified emphysema type ([facility])  -     fluticasone (FLONASE) 50 MCG/ACT nasal spray; 1 spray by Each Nostril route daily  -     albuterol sulfate HFA (PROAIR HFA) 108 (90 Base) MCG/ACT inhaler; INHALE TWO PUFFS BY MOUTH FOUR TIMES A DAY AS NEEDED    Primary osteoarthritis of right shoulder  -     gabapentin (NEURONTIN) 100 MG capsule; Take 1 capsule by mouth nightly for 360 days. Anxiety  -     citalopram (CELEXA) 40 MG tablet; Take 1 tablet by mouth daily Instructed to take am of procedure    Acquired hypothyroidism  -     levothyroxine (SYNTHROID) 50 MCG tablet; Take 1 tablet by mouth Daily        Comments: Discussion with patient regarding uncontrolled diabetes and risk with side effects. She is agreeable to starting insulin we will call in Lantus and Humalog insulin. With strict instructions. Also schedule appoint with endocrinology. Have her check blood sugars 3 times a day with a log. Not sure if she is up to doing this. Her  and daughter smoke in the house advised him to discontinue doing so. She wears her oxygen 24/7 check blood pressure. Daily. A great deal of time spent reviewing medications, diet, exercise, social issues. Also reviewing the chart before entering the room with patient and finishing charting after leaving patient's room. More than half of that time was spent face to face with the patient in counseling and coordinating care. Follow Up: Return in about 1 week (around 11/6/2020) for With BS.      Seen by:  Nereida Bateman DO

## 2020-10-31 PROBLEM — Z79.4 TYPE 2 DIABETES MELLITUS WITHOUT COMPLICATION, WITH LONG-TERM CURRENT USE OF INSULIN (HCC): Status: ACTIVE | Noted: 2019-08-30

## 2020-10-31 RX ORDER — CITALOPRAM 40 MG/1
40 TABLET ORAL DAILY
Qty: 90 TABLET | Refills: 5 | Status: SHIPPED
Start: 2020-10-31 | End: 2022-01-21 | Stop reason: SDUPTHER

## 2020-10-31 RX ORDER — GLIMEPIRIDE 1 MG/1
1 TABLET ORAL
Qty: 180 TABLET | Refills: 12 | Status: SHIPPED
Start: 2020-10-31 | End: 2021-12-02 | Stop reason: SDUPTHER

## 2020-10-31 RX ORDER — FLUTICASONE PROPIONATE 50 MCG
1 SPRAY, SUSPENSION (ML) NASAL DAILY
Qty: 1 BOTTLE | Refills: 12 | Status: SHIPPED | OUTPATIENT
Start: 2020-10-31

## 2020-10-31 RX ORDER — INSULIN GLARGINE 100 [IU]/ML
10 INJECTION, SOLUTION SUBCUTANEOUS NIGHTLY
Qty: 5 PEN | Refills: 12 | Status: SHIPPED
Start: 2020-10-31 | End: 2020-12-05

## 2020-10-31 RX ORDER — HYDROCHLOROTHIAZIDE 12.5 MG/1
12.5 CAPSULE, GELATIN COATED ORAL DAILY
Qty: 90 CAPSULE | Refills: 5 | Status: SHIPPED
Start: 2020-10-31 | End: 2021-11-18 | Stop reason: SDUPTHER

## 2020-10-31 RX ORDER — GABAPENTIN 100 MG/1
100 CAPSULE ORAL NIGHTLY
Qty: 90 CAPSULE | Refills: 3 | Status: SHIPPED
Start: 2020-10-31 | End: 2021-11-01 | Stop reason: SDUPTHER

## 2020-10-31 RX ORDER — ALBUTEROL SULFATE 90 UG/1
AEROSOL, METERED RESPIRATORY (INHALATION)
Qty: 25.5 G | Refills: 12 | Status: SHIPPED
Start: 2020-10-31 | End: 2022-01-06 | Stop reason: SDUPTHER

## 2020-10-31 RX ORDER — INSULIN ASPART 100 [IU]/ML
INJECTION, SOLUTION INTRAVENOUS; SUBCUTANEOUS
Qty: 5 PEN | Refills: 3 | Status: SHIPPED
Start: 2020-10-31 | End: 2021-07-06 | Stop reason: SDUPTHER

## 2020-10-31 RX ORDER — PEN NEEDLE, DIABETIC 30 GX5/16"
1 NEEDLE, DISPOSABLE MISCELLANEOUS DAILY
Qty: 100 EACH | Refills: 3 | Status: SHIPPED
Start: 2020-10-31 | End: 2021-03-13 | Stop reason: SDUPTHER

## 2020-10-31 RX ORDER — LEVOTHYROXINE SODIUM 0.05 MG/1
50 TABLET ORAL DAILY
Qty: 90 TABLET | Refills: 5 | Status: SHIPPED
Start: 2020-10-31 | End: 2021-11-18 | Stop reason: SDUPTHER

## 2020-10-31 ASSESSMENT — PATIENT HEALTH QUESTIONNAIRE - PHQ9
SUM OF ALL RESPONSES TO PHQ QUESTIONS 1-9: 0
1. LITTLE INTEREST OR PLEASURE IN DOING THINGS: 0
SUM OF ALL RESPONSES TO PHQ QUESTIONS 1-9: 0
2. FEELING DOWN, DEPRESSED OR HOPELESS: 0
SUM OF ALL RESPONSES TO PHQ QUESTIONS 1-9: 0
SUM OF ALL RESPONSES TO PHQ9 QUESTIONS 1 & 2: 0

## 2020-10-31 ASSESSMENT — ENCOUNTER SYMPTOMS: SHORTNESS OF BREATH: 1

## 2020-11-06 ENCOUNTER — TELEPHONE (OUTPATIENT)
Dept: PRIMARY CARE CLINIC | Age: 65
End: 2020-11-06

## 2020-11-07 ENCOUNTER — VIRTUAL VISIT (OUTPATIENT)
Dept: PRIMARY CARE CLINIC | Age: 65
End: 2020-11-07
Payer: COMMERCIAL

## 2020-11-07 PROCEDURE — 99213 OFFICE O/P EST LOW 20 MIN: CPT | Performed by: FAMILY MEDICINE

## 2020-11-07 RX ORDER — INSULIN GLARGINE 100 [IU]/ML
10 INJECTION, SOLUTION SUBCUTANEOUS NIGHTLY
Qty: 5 PEN | Refills: 12 | Status: SHIPPED
Start: 2020-11-07 | End: 2020-12-05 | Stop reason: SDUPTHER

## 2020-11-07 NOTE — PROGRESS NOTES
Negative. Musculoskeletal: Negative. Skin: Negative. Allergic/Immunologic: Negative. Neurological: Negative. Hematological: Negative. Psychiatric/Behavioral: Negative. Current Outpatient Medications:     insulin glargine (BASAGLAR KWIKPEN) 100 UNIT/ML injection pen, Inject 10 Units into the skin nightly, Disp: 5 pen, Rfl: 12    citalopram (CELEXA) 40 MG tablet, Take 1 tablet by mouth daily Instructed to take am of procedure, Disp: 90 tablet, Rfl: 5    metFORMIN (GLUCOPHAGE) 500 MG tablet, Take 2 tablets by mouth 2 times daily (with meals) Take 2 tablets 2 times a day, Disp: 360 tablet, Rfl: 5    fluticasone (FLONASE) 50 MCG/ACT nasal spray, 1 spray by Each Nostril route daily, Disp: 1 Bottle, Rfl: 12    levothyroxine (SYNTHROID) 50 MCG tablet, Take 1 tablet by mouth Daily, Disp: 90 tablet, Rfl: 5    hydroCHLOROthiazide (MICROZIDE) 12.5 MG capsule, Take 1 capsule by mouth daily, Disp: 90 capsule, Rfl: 5    glimepiride (AMARYL) 1 MG tablet, Take 1 tablet by mouth 2 times daily (before meals), Disp: 180 tablet, Rfl: 12    albuterol sulfate HFA (PROAIR HFA) 108 (90 Base) MCG/ACT inhaler, INHALE TWO PUFFS BY MOUTH FOUR TIMES A DAY AS NEEDED, Disp: 25.5 g, Rfl: 12    gabapentin (NEURONTIN) 100 MG capsule, Take 1 capsule by mouth nightly for 360 days. , Disp: 90 capsule, Rfl: 3    insulin glargine (LANTUS SOLOSTAR) 100 UNIT/ML injection pen, Inject 10 Units into the skin nightly, Disp: 5 pen, Rfl: 12    insulin aspart (NOVOLOG FLEXPEN) 100 UNIT/ML injection pen, Less than 120 no insulin, 121 -200 take 10 units, 201-300 take 15 units, over 301 take 20 units--- before each meal, Disp: 5 pen, Rfl: 3    Insulin Pen Needle (PEN NEEDLES 5/16\") 30G X 8 MM MISC, 1 each by Does not apply route daily Insulin 4 times a day.   May change to what ever type of needle is available, Disp: 100 each, Rfl: 3    blood glucose test strips (ASCENSIA AUTODISC VI;ONE TOUCH ULTRA TEST VI) strip, 1 each by In Vitro route 2 times daily As needed. , Disp: 100 each, Rfl: 12    aspirin 325 MG EC tablet, Take 1 tablet by mouth 2 times daily, Disp: 84 tablet, Rfl: 0    docusate sodium (COLACE) 100 MG capsule, Take 1 capsule by mouth 2 times daily as needed for Constipation, Disp: 30 capsule, Rfl: 1    fluticasone-umeclidin-vilant (TRELEGY ELLIPTA) 100-62.5-25 MCG/INH AEPB, Inhale 1 puff into the lungs daily (Patient taking differently: Inhale 1 puff into the lungs daily Instructed to take am of procedure), Disp: 3 each, Rfl: 3    TURMERIC PO, Take by mouth 2 times daily LD 2-14-20, Disp: , Rfl:     BIOTIN PO, Take by mouth daily LD 2-14-20, Disp: , Rfl:     ipratropium-albuterol (DUONEB) 0.5-2.5 (3) MG/3ML SOLN nebulizer solution, Inhale 1 vial into the lungs every 4 hours as needed for Shortness of Breath Instructed to take am of procedure if needed, Disp: , Rfl:     loratadine (CLARITIN) 10 MG capsule, Take 10 mg by mouth daily, Disp: , Rfl:     Cholecalciferol (VITAMIN D3) 2000 units CAPS, Take 5,000 Units by mouth daily Ld 2-14-20, Disp: , Rfl:     Coenzyme Q10 (COQ-10) 10 MG CAPS, Take by mouth daily Ld 2-14-20, Disp: , Rfl:   Allergies   Allergen Reactions    Macrobid [Nitrofurantoin Macrocrystal] Other (See Comments)     Exacerbates asthma       Social History     Socioeconomic History    Marital status:      Spouse name: Not on file    Number of children: Not on file    Years of education: Not on file    Highest education level: Not on file   Occupational History    Not on file   Social Needs    Financial resource strain: Not on file    Food insecurity     Worry: Not on file     Inability: Not on file    Transportation needs     Medical: Not on file     Non-medical: Not on file   Tobacco Use    Smoking status: Former Smoker     Packs/day: 1.00     Years: 30.00     Pack years: 30.00     Last attempt to quit: 05/2017     Years since quitting: 3.5    Smokeless tobacco: Never Used   Substance and Hypertension     Hypothyroidism     Leukocytosis     Obesity     Osteoarthritis     Sleep apnea     no CPAP     Thyroid disease     Type 2 diabetes mellitus without complication (HCC)      Past Surgical History:   Procedure Laterality Date    CARPAL TUNNEL RELEASE Left 7/1/2019    LEFT CARPAL TUNNEL RELEASE performed by Whit iPtt MD at [address]      times 3    DILATION AND CURETTAGE OF UTERUS      HERNIA REPAIR      ROTATOR CUFF REPAIR Right     SHOULDER SURGERY Right 2/21/2020    RIGHT SHOULDER TOTAL ARTHROPLASTY REVERSE WITH ISB performed [facility] Whit Pitt MD at [facility] OR    TONSILLECTOMY AND ADENOIDECTOMY      TOTAL KNEE ARTHROPLASTY Left     TUBAL LIGATION       Family History   Problem Relation Age of Onset    Stroke Mother     Diabetes Type 1  Mother     Heart Attack Father 79      Past Surgical History:   Procedure Laterality Date    CARPAL TUNNEL RELEASE Left 7/1/2019    LEFT CARPAL TUNNEL RELEASE performed by Whit Pitt MD at [address]      times 3    DILATION AND CURETTAGE OF UTERUS      HERNIA REPAIR      ROTATOR CUFF REPAIR Right     SHOULDER SURGERY Right 2/21/2020    RIGHT SHOULDER TOTAL ARTHROPLASTY REVERSE WITH ISB performed by Whit Pitt MD at [address] [facility] ARTHROPLASTY Left     TUBAL LIGATION        Immunization History   Administered Date(s) Administered    Influenza, Quadv, IM, PF (6 mo and older Fluzone, Flulaval, Fluarix, and 3 yrs and older Afluria) 10/30/2020    Pneumococcal Polysaccharide ([id number]) 10/30/2020    Tdap (Boostrix, Adacel) 10/30/2013     Health Maintenance   Topic Date Due    Hepatitis C screen  1955    Diabetic foot exam  04/17/1966    Diabetic retinal exam  04/17/1966    HIV screen  04/17/1971    Cervical cancer screen  04/17/1977    Shingles Vaccine (1 of 2) 04/17/2006    Colon cancer screen colonoscopy 04/17/2006    Low dose CT lung screening  04/17/2011    Breast cancer screen  06/20/2016    A1C test (Diabetic or Prediabetic)  01/28/2021    Diabetic microalbuminuria test  10/28/2021    Lipid screen  10/28/2021    Potassium monitoring  10/28/2021    Creatinine monitoring  10/28/2021    DTaP/Tdap/Td vaccine (2 - Td) 10/30/2023    Flu vaccine  Completed    Pneumococcal 0-64 years Vaccine  Completed    Hepatitis A vaccine  Aged Out    Hib vaccine  Aged Out    Meningococcal (ACWY) vaccine  Aged Out         There were no vitals filed for this visit. Objective:    Physical Exam  Constitutional:       General: She is not in acute distress. Appearance: Normal appearance. HENT:      Head: Normocephalic. Right Ear: External ear normal.      Left Ear: External ear normal.   Eyes:      General:         Right eye: No discharge. Left eye: No discharge. Extraocular Movements: Extraocular movements intact. Neck:      Musculoskeletal: Neck supple. No neck rigidity. Pulmonary:      Effort: Pulmonary effort is normal. No respiratory distress. Musculoskeletal: Normal range of motion. Skin:     Findings: No bruising or rash. Neurological:      Mental Status: She is alert and oriented to person, place, and time. Psychiatric:         Mood and Affect: Mood and affect normal.         Speech: Speech normal.         Behavior: Behavior normal. Behavior is cooperative. Thought Content: Thought content normal.         Judgment: Judgment normal.         Maricel Gagnon was seen today for diabetes and follow-up. Diagnoses and all orders for this visit:    Type 2 diabetes mellitus without complication, with long-term current use of insulin (HCC)  -     insulin glargine (BASAGLAR KWIKPEN) 100 UNIT/ML injection pen; Inject 10 Units into the skin nightly  -     Ambulatory referral to Diabetic Education        Comments:  We will send in [address] to see if it is covered if it is not covered she is to notify us and also ask the pharmacist what long-acting insulin is covered by her plan. We will try to do prior authorization of not. Diabetic education classes prescription sent to the patient    A great deal of time spent reviewing medications, diet, exercise, social issues. Also reviewing the chart before entering the room with patient and finishing charting after leaving patient's room. More than half of that time was spent face to face with the patient in counseling and coordinating care. The patient is being evaluated by a Virtual Visit (video visit) encounter to address concerns as mentioned above. A caregiver was present when appropriate. Due to this being a TeleHealth encounter (During XZOCD-10 public health emergency), evaluation of the following organ systems was limited: Vitals/Constitutional/EENT/Resp/CV/GI//MS/Neuro/Skin/Heme-Lymph-Imm. Pursuant to the emergency declaration under the [address] [place] [address], [address] [place] [address] and the [employer] and [employer] Act, this Virtual Visit was conducted with patient's (and/or legal guardian's) consent, to reduce the patient's risk of exposure to COVID-19 and provide necessary medical care. The patient (and/or legal guardian) has also been advised to contact this office for worsening conditions or problems, and seek emergency medical treatment and/or call 911 if deemed necessary. Patient identification was verified at the start of the visit: Yes    Total time spent on this encounter: Not billed by time    Services were provided through a video synchronous discussion virtually to substitute for in-person clinic visit. Patient and provider were located at their individual homes. Follow Up: Return in about 1 month (around 12/7/2020) for With BS.      Seen by:  Yordy Bah DO

## 2020-11-09 ASSESSMENT — ENCOUNTER SYMPTOMS
RESPIRATORY NEGATIVE: 1
EYES NEGATIVE: 1
ALLERGIC/IMMUNOLOGIC NEGATIVE: 1
GASTROINTESTINAL NEGATIVE: 1

## 2020-12-05 ENCOUNTER — OFFICE VISIT (OUTPATIENT)
Dept: PRIMARY CARE CLINIC | Age: 65
End: 2020-12-05
Payer: COMMERCIAL

## 2020-12-05 VITALS
SYSTOLIC BLOOD PRESSURE: 135 MMHG | WEIGHT: 207 LBS | TEMPERATURE: 98.2 F | RESPIRATION RATE: 18 BRPM | BODY MASS INDEX: 43.26 KG/M2 | DIASTOLIC BLOOD PRESSURE: 83 MMHG

## 2020-12-05 PROBLEM — J44.9 COPD WITHOUT EXACERBATION (HCC): Status: ACTIVE | Noted: 2019-08-30

## 2020-12-05 PROCEDURE — 99214 OFFICE O/P EST MOD 30 MIN: CPT | Performed by: FAMILY MEDICINE

## 2020-12-05 RX ORDER — INSULIN GLARGINE 100 [IU]/ML
25 INJECTION, SOLUTION SUBCUTANEOUS NIGHTLY
Qty: 5 PEN | Refills: 12 | Status: SHIPPED
Start: 2020-12-05 | End: 2021-07-06 | Stop reason: SDUPTHER

## 2020-12-05 RX ORDER — CELECOXIB 200 MG/1
200 CAPSULE ORAL DAILY
Qty: 90 CAPSULE | Refills: 1 | Status: SHIPPED
Start: 2020-12-05 | End: 2021-07-19 | Stop reason: SDUPTHER

## 2020-12-05 ASSESSMENT — PATIENT HEALTH QUESTIONNAIRE - PHQ9
SUM OF ALL RESPONSES TO PHQ QUESTIONS 1-9: 0
1. LITTLE INTEREST OR PLEASURE IN DOING THINGS: 0
2. FEELING DOWN, DEPRESSED OR HOPELESS: 0
SUM OF ALL RESPONSES TO PHQ QUESTIONS 1-9: 0
SUM OF ALL RESPONSES TO PHQ9 QUESTIONS 1 & 2: 0
SUM OF ALL RESPONSES TO PHQ QUESTIONS 1-9: 0

## 2020-12-05 ASSESSMENT — ENCOUNTER SYMPTOMS
EYES NEGATIVE: 1
GASTROINTESTINAL NEGATIVE: 1
ALLERGIC/IMMUNOLOGIC NEGATIVE: 1
SHORTNESS OF BREATH: 1

## 2020-12-05 NOTE — PROGRESS NOTES
20  Name: Jayce Chaves    : 1955    Sex: female    Age: 60 y.o. Subjective:  Chief Complaint: Patient is here for  diabetes     She was able to get the  basaglar         Fel better  Set for  Dm clses soon on Virtual visit       home 164---150----128    Now low  Sugar   She forget pm kiki at times    Takes basaglar  Hs and  Log  Tid ac    Told me today  Been on celebrex for   8 months  From  [facility]    Not sleep well      Review of Systems   Constitutional: Negative. HENT: Negative. Eyes: Negative. Respiratory: Positive for shortness of breath (no chg  wearing o 2). Cardiovascular: Negative. Gastrointestinal: Negative. Endocrine: Negative. Genitourinary: Negative. Skin: Negative. Allergic/Immunologic: Negative. Hematological: Negative. Psychiatric/Behavioral: Negative.           Current Outpatient Medications:     celecoxib (CELEBREX) 200 MG capsule, Take 1 capsule by mouth daily, Disp: 90 capsule, Rfl: 1    insulin glargine (BASAGLAR KWIKPEN) 100 UNIT/ML injection pen, Inject 25 Units into the skin nightly, Disp: 5 pen, Rfl: 12    citalopram (CELEXA) 40 MG tablet, Take 1 tablet by mouth daily Instructed to take am of procedure, Disp: 90 tablet, Rfl: 5    metFORMIN (GLUCOPHAGE) 500 MG tablet, Take 2 tablets by mouth 2 times daily (with meals) Take 2 tablets 2 times a day, Disp: 360 tablet, Rfl: 5    fluticasone (FLONASE) 50 MCG/ACT nasal spray, 1 spray by Each Nostril route daily, Disp: 1 Bottle, Rfl: 12    levothyroxine (SYNTHROID) 50 MCG tablet, Take 1 tablet by mouth Daily, Disp: 90 tablet, Rfl: 5    hydroCHLOROthiazide (MICROZIDE) 12.5 MG capsule, Take 1 capsule by mouth daily, Disp: 90 capsule, Rfl: 5    glimepiride (AMARYL) 1 MG tablet, Take 1 tablet by mouth 2 times daily (before meals), Disp: 180 tablet, Rfl: 12    albuterol sulfate HFA (PROAIR HFA) 108 (90 Base) MCG/ACT inhaler, INHALE TWO PUFFS BY MOUTH FOUR TIMES A DAY AS NEEDED, Disp: 25.5 g, Rfl: 12    gabapentin (NEURONTIN) 100 MG capsule, Take 1 capsule by mouth nightly for 360 days. , Disp: 90 capsule, Rfl: 3    insulin aspart (NOVOLOG FLEXPEN) 100 UNIT/ML injection pen, Less than 120 no insulin, 121 -200 take 10 units, 201-300 take 15 units, over 301 take 20 units--- before each meal, Disp: 5 pen, Rfl: 3    Insulin Pen Needle (PEN NEEDLES 5/16\") 30G X 8 MM MISC, 1 each by Does not apply route daily Insulin 4 times a day. May change to what ever type of needle is available, Disp: 100 each, Rfl: 3    blood glucose test strips (ASCENSIA AUTODISC VI;ONE TOUCH ULTRA TEST VI) strip, 1 each by In Vitro route 2 times daily As needed. , Disp: 100 each, Rfl: 12    aspirin 325 MG EC tablet, Take 1 tablet by mouth 2 times daily, Disp: 84 tablet, Rfl: 0    docusate sodium (COLACE) 100 MG capsule, Take 1 capsule by mouth 2 times daily as needed for Constipation, Disp: 30 capsule, Rfl: 1    fluticasone-umeclidin-vilant (TRELEGY ELLIPTA) 100-62.5-25 MCG/INH AEPB, Inhale 1 puff into the lungs daily (Patient taking differently: Inhale 1 puff into the lungs daily Instructed to take am of procedure), Disp: 3 each, Rfl: 3    TURMERIC PO, Take by mouth 2 times daily LD 2-14-20, Disp: , Rfl:     BIOTIN PO, Take by mouth daily LD 2-14-20, Disp: , Rfl:     ipratropium-albuterol (DUONEB) 0.5-2.5 (3) MG/3ML SOLN nebulizer solution, Inhale 1 vial into the lungs every 4 hours as needed for Shortness of Breath Instructed to take am of procedure if needed, Disp: , Rfl:     loratadine (CLARITIN) 10 MG capsule, Take 10 mg by mouth daily, Disp: , Rfl:     Cholecalciferol (VITAMIN D3) 2000 units CAPS, Take 5,000 Units by mouth daily Ld 2-14-20, Disp: , Rfl:     Coenzyme Q10 (COQ-10) 10 MG CAPS, Take by mouth daily Ld 2-14-20, Disp: , Rfl:   Allergies   Allergen Reactions    Macrobid [Nitrofurantoin Macrocrystal] Other (See Comments)     Exacerbates asthma     Social History     Socioeconomic History    Marital status:      Spouse name: Not on file    Number of children: Not on file    Years of education: Not on file    Highest education level: Not on file   Occupational History    Not on file   Social Needs    Financial resource strain: Not on file    Food insecurity     Worry: Not on file     Inability: Not on file    Transportation needs     Medical: Not on file     Non-medical: Not on file   Tobacco Use    Smoking status: Former Smoker     Packs/day: 1.00     Years: 30.00     Pack years: 30.00     Last attempt to quit: 05/2017     Years since quitting: 3.6    Smokeless tobacco: Never Used   Substance and Sexual Activity    Alcohol use: Yes     Comment: rare    Drug use: Never    Sexual activity: Not on file   Lifestyle    Physical activity     Days per week: Not on file     Minutes per session: Not on file    Stress: Not on file   Relationships    Social connections     Talks on phone: Not on file     Gets together: Not on file     Attends Bahai service: Not on file     Active member of club or organization: Not on file     Attends meetings of clubs or organizations: Not on file     Relationship status: Not on file    Intimate partner violence     Fear of current or ex partner: Not on file     Emotionally abused: Not on file     Physically abused: Not on file     Forced sexual activity: Not on file   Other Topics Concern    Not on file   Social History Narrative        OBESITY    SMOKER----QUIT 11-09------BACK------ QUIT 2-18    [facility] CAD    LEFT SDIED [address] [facility] [address] Rafael Laboy 2006    OA KNEES    EDEMA LEGS    [facility] DM    LEUKOCYTOSIS--DR BARGER IN PAST--THEN DR BOWMAN 2005    DEPRESSION    NIDDM    HTN    ELEV CRP    EARLY HYPOTHYROID    ASTHMA    SLEEP APNEA------C-PAP    D AND C 1-09----DR PALMA    TORN MENISCUS L KNEE OR 2000 DR DIEHL------SEVERE OA L KNEE ON X RAY 9-11    EMG 9-14 SEVERE CTS L MOD-SEV R---REFER TO DR STEPHENSON    INJECTIONS KNEE DR GTZ    LEFT TOTAL KNEE OR 2-15  TRESA--    R CARPAL TUNNEL SURGEYR 3-15 DR GTZ    VITROUS SEPARATION LEFT EYE 11-15    R SHOULDER OR TORN ROTATOR CUFF 6-16 DR SURESH---PT NOT HAPPY    PT MOVED BACK HOME 2-17    VENTRAL HERNIA OR DR Cain Blizzard 9-17    EVAL DR MARROQUIN 8-18 COPD ADDED O2    RETIRED 9-18 DISABILITY 1-19    Left carpal tunnel  7-19 surgery    Right total shoulder arthroplasty   Dr Candace Jurado      Past Medical History:   Diagnosis Date    Arthritis     Asthma     COPD (chronic obstructive pulmonary disease) ([address]     uses O2 NC 2 L continuous     Depression     Diabetes mellitus ([address]     Edema leg     Hypertension     Hypothyroidism     Leukocytosis     Obesity     Osteoarthritis     Sleep apnea     no CPAP     Thyroid disease     Type 2 diabetes mellitus without complication ([address]      Family History   Problem Relation Age of Onset    Stroke Mother     Diabetes Type 1  Mother     Heart Attack Father 79      Past Surgical History:   Procedure Laterality Date    CARPAL TUNNEL RELEASE Left 7/1/2019    LEFT CARPAL TUNNEL RELEASE performed by Luke Jett MD at [facility] 3    DILATION AND CURETTAGE OF UTERUS      HERNIA REPAIR      ROTATOR CUFF REPAIR Right     SHOULDER SURGERY Right 2/21/2020    RIGHT SHOULDER TOTAL ARTHROPLASTY REVERSE WITH ISB performed by Luke Jett MD at [address] Left     TUBAL LIGATION        Vitals:    12/05/20 1006   BP: 135/83   Resp: 18   Temp: 98.2 °F (36.8 °C)   TempSrc: Temporal   Weight: 207 lb (93.9 kg)       Objective:    Physical Exam  Vitals signs reviewed. Constitutional:       Appearance: She is well-developed. She is obese. HENT:      Head: Normocephalic. Eyes:      Pupils: Pupils are equal, round, and reactive to light. Neck:      Musculoskeletal: Normal range of motion. Cardiovascular:      Rate and Rhythm: Normal rate and regular rhythm.

## 2020-12-09 ENCOUNTER — HOSPITAL ENCOUNTER (OUTPATIENT)
Dept: DIABETES SERVICES | Age: 65
Setting detail: THERAPIES SERIES
Discharge: HOME | End: 2020-12-09
Payer: COMMERCIAL

## 2020-12-09 PROCEDURE — G0108 DIAB MANAGE TRN  PER INDIV: HCPCS

## 2020-12-09 SDOH — ECONOMIC STABILITY: FOOD INSECURITY: ADDITIONAL INFORMATION: NO

## 2020-12-09 ASSESSMENT — PATIENT HEALTH QUESTIONNAIRE - PHQ9
SUM OF ALL RESPONSES TO PHQ QUESTIONS 1-9: 5
SUM OF ALL RESPONSES TO PHQ QUESTIONS 1-9: 5
SUM OF ALL RESPONSES TO PHQ9 QUESTIONS 1 & 2: 1
1. LITTLE INTEREST OR PLEASURE IN DOING THINGS: 0
SUM OF ALL RESPONSES TO PHQ QUESTIONS 1-9: 5
2. FEELING DOWN, DEPRESSED OR HOPELESS: 1

## 2020-12-09 NOTE — PROGRESS NOTES
Diabetes Self-Management Education Record    Participant Name: Adal Amador  Referring Provider: Ric Chance DO  Assessment/Evaluation Ratings:  1=Needs Instruction   4=Demonstrates Understanding/Competency  2=Needs Review   NC=Not Covered    3=Comprehends Key Points  N/A=Not Applicable  Topics/Learning Objectives Pre-session Assess Date:  12/9/20 KMS Instr. Date Follow-up Date Post- session Eval Comments   Diabetes disease process & Treatment process:   -Define diabetes & prediabetes and ABCs of     diabetes   -Identify own type of diabetes  -Identify lifestyle changes/treatment options 2 12/9/20 KMS  4 Type 2 DM for about 10 years  A1C 11.1%    Developing strategies for Healthy coping/psychosocial issues:    -Describe feelings about living with diabetes  -Identify coping strategies;   -Identify support needed & support network available 1 12/9/20 KMS  3     12/9/20 KMS  PHQ-2 Depression Screen Score: 1  PHQ-9 Score: 4     Prevention, detection & treatment of Chronic complications:    -Identify the prevention, detection and treatment for complications including immunizations, preventive eye, foot, dental and renal exams as indicated per the participant's duration of diabetes and health status.  -Define the natural course of diabetes and the relationship of blood glucose levels to long term complications of diabetes.   2 12/9/20 KMS  4    Prevention, detection & treatment of acute complications:    -List symptoms of hyper & hypoglycemia, and prevention & treatment strategies.   -Describe sick day guidelines  DKA /indications for ketone testing &  when to call physician  1 12/9/20 KMS  4    Identify severe weather/situation crisis  & diabetes supplies management        Using medications safely:   -State effects of diabetes medicines on blood glucose levels;  -List diabetes medication taken, action & side effects 2 12/9/20 KMS  4 Basaglar 25 units at night (just increased)  Novolog sliding scale TID Insulin/Injectables  -Name appropriate injection sites; proper storage; supplies needed;  2 12/9/20 KMS  4     Demonstrate proper technique        Monitoring blood glucose, interpreting and using results:   -Identify recommended & personal blood glucose targets & HgbA1C target levels  -State the Importance of logging blood glucose levels for pattern recognition;   -State benefits of reading/using pt generated health data  -Verbalize safe lancet disposal 2 12/9/20 KMS  4 Monitors before meals and occasionally before bedtime. Keeps a glucose log.    -Demonstrate proper testing technique        Incorporating physical activity into lifestyle:   -State effect of exercise on blood glucose levels;   -State benefits of regular exercise;   -Define safety considerations;  -Describe contraindications/maintenance of activity. 1 12/9/20 KMS  4 Just started doing exercise videos involving stretching and light hand weights. Limited by knee pain. Will aim for 10 minutes of activity 3 times per day 5 days per week. Incorporating nutritional management into lifestyle:   -Describe effect of type, amount & timing of food on blood glucose  -Describe methods for preparing and planning healthy meals  Correctly read food labels 1 12/9/20 KMS  4 Verbalizes a good understanding of the plate method and label reading. Will work on spacing out meals and snacks with 2/2/2 carb servings at meals and 1 carb serving between meals and at HS--meal plan prepared by Aleksey Cardozo, DEXTER, RDN, LD. Plan personal carbohydrate-controlled meal based on individualized meal plan  Demonstrate CHO counting/portion control         Developing strategies for problem solving to promote health/change behavior. -Identify 7 self-care behaviors; Personal health risk factors; Benefits, challenges & strategies for behavioral change and set an individualized goal selection.  1 12/9/20 KMS  4 Goal:  I will follow my meal plan and increase activity to 10 minutes 3 times per day 5 days per week by doing stretching exercises and lifting light hand weights. Identified Barriers to learning/adherence to self management plan:    None  []  other    Instruction Method:  Lecture/Discussion and Handouts    Supporting Education Materials/Equipment Provided: Self-management manual   []Kosovan materials       [] services     []Other:      Encounter Type Date Attended Start Time End Time Comments No Show Dates   1:1 DSMES telehealth  12/9/20 KMS 1000 1125       Session 1         Session 2        Session 3         In person Follow-up         Gestational Diabetes         Individual MNT        Meter Instrx        Insulin Instrx           Additional Comments: DSMES conducted via tele-health visit d/t COVID-19 national emergency. PCP notified via EMR.      Date:           DSMES Follow-up plan based on patients DSMES goal    Dr Notified by [] EMR []Fax        []HgbA1C   []Weight   []Hypertension  []Follow-up with Physician  []Medication compliance   []Plate method/meal plan compliance   []Self-Foot Exam Frequency   []Monitoring Frequency   []Exercise Routine   []Goal Attainment       []Patient lost to follow-up  Dr Notified by []EMR []Fax     Personal Support Plan:      [x] Keep all scheduled doctor appointments   [x] Make and keep appointments with specialists (foot, eye, dentist) as recommended   [] Consult my pharmacist about all new medications or to ask any medication questions   [] Get tested for sleep apnea   [] Seek help for:   [] Make an appointment with:   [] Attend smoking cessation classes or call 1-800-QUIT-NOW  [] Attend Diabetes Support Group   [] Use diabetes magazines, books, or credible web-sites like the ADA for more information  [] Increase exercise at home or join an exercise program:   [] Other:

## 2020-12-09 NOTE — LETTER
[facility])- Diabetes Education    2020       Re:     Alvin Wylie         :  1955  Dear Dr. Ga Records:                    Thank you for referring your patient, Alvin Wylie, for diabetes education. Your patient has completed her personalized initial comprehensive education plan. Your patient attended class on 20 that addressed the following topics:    Nursing/Medical [x]      Nutrition [x]  [] Diabetes disease process & treatment   [] Diabetes medication use and safety   [] Self-monitoring blood glucose/interpreting results  [] Prevention, detection and treatment of acute complications  [] Prevention, detection and treatment of chronic complications  [] Developing strategies to address psychosocial issues    [] Nutritional management: basic principles   [] Carbohydrate counting, plate method, label reading  [] Benefits of/ways to incorporate physical activity  [] Problem solving and preparing for crisis situations  [] Diabetes supply management  [] Goal setting and behavior modification         PHQ-9 Depression Screen Score:  5  0-4: Minimal Depression                5-9: Mild Depression    10-14: Moderate Depression  15-19: Moderately Severe Depression  20-27: Severe Depression     COMMENTS:      PATIENT SELECTED GOAL:   [x]   I will follow my meal plan and increase activity to 10 minutes 3 times per day 5 days per week by doing stretching exercises and lifting light hand weights. []  I will increase my activity to:  []  I will check my blood glucose as ordered by my doctor. []  I will take my medications at the correct times as ordered by my doctor.   []  Other:      DIABETES SELF-MANAGEMENT SUPPORT PLAN/REFERRALS (patient identified):  [x] Keep my scheduled visits with my doctor   [] Make and keep appointments with specialists (foot, eye, dentist) as recommended  [] Consult my pharmacist with all new medications and/or any medication questions  [] Get tested for sleep apnea [] Seek help for:   [] Make an appointment with:   [] Attend smoking cessation classes or call help-line (1-283-QUIT-NOW; [phone])  [] Attend a diabetes support group  [] Use diabetes magazines, books, or the American Diabetes Association website (www.diabetes. org) for more information    [x] Increase exercising at home   [] Other: There will be a follow-up in 3 months to evaluate the attainment of their chosen goal, and self identified support plan and you will be notified of their progress. Thank you for referring this patient to our program.  Please do not hesitate to call if you have any questions at [phone] [facility] or [facility]) or [phone] ([address] [place] [address]).         Sincerely,    Diabetes Educators:     []  SAVANAH Gonzalez      [x]  Rudean Phalen, RN BSN GIA     []  MICHAEL Aviles         []  MS MICHAEL Goel   []  Negin STARKS  []  MICHAEL Casillas        Diabetes

## 2020-12-18 RX ORDER — LANCETS 30 GAUGE
1 EACH MISCELLANEOUS 4 TIMES DAILY
Qty: 400 EACH | Refills: 5 | Status: SHIPPED
Start: 2020-12-18 | End: 2021-03-16 | Stop reason: SDUPTHER

## 2020-12-18 RX ORDER — LANCETS 30 GAUGE
1 EACH MISCELLANEOUS 4 TIMES DAILY
COMMUNITY
End: 2020-12-18 | Stop reason: SDUPTHER

## 2021-02-10 ENCOUNTER — HOSPITAL ENCOUNTER (OUTPATIENT)
Age: 66
Discharge: HOME | End: 2021-02-10
Payer: COMMERCIAL

## 2021-02-10 DIAGNOSIS — I10 ESSENTIAL HYPERTENSION: Chronic | ICD-10-CM

## 2021-02-10 DIAGNOSIS — E11.9 TYPE 2 DIABETES MELLITUS WITHOUT COMPLICATION, WITH LONG-TERM CURRENT USE OF INSULIN (HCC): ICD-10-CM

## 2021-02-10 DIAGNOSIS — Z79.4 TYPE 2 DIABETES MELLITUS WITHOUT COMPLICATION, WITH LONG-TERM CURRENT USE OF INSULIN (HCC): ICD-10-CM

## 2021-02-10 LAB
ALBUMIN SERPL-MCNC: 4.2 G/DL (ref 3.5–5.2)
ALP BLD-CCNC: 96 U/L (ref 35–104)
ALT SERPL-CCNC: 13 U/L (ref 0–32)
ANION GAP SERPL CALCULATED.3IONS-SCNC: 10 MMOL/L (ref 7–16)
AST SERPL-CCNC: 14 U/L (ref 0–31)
BACTERIA: ABNORMAL /HPF
BASOPHILS ABSOLUTE: 0.11 E9/L (ref 0–0.2)
BASOPHILS RELATIVE PERCENT: 0.7 % (ref 0–2)
BILIRUB SERPL-MCNC: 0.4 MG/DL (ref 0–1.2)
BILIRUBIN URINE: NEGATIVE
BLOOD, URINE: NEGATIVE
BUN BLDV-MCNC: 21 MG/DL (ref 8–23)
CALCIUM SERPL-MCNC: 9 MG/DL (ref 8.6–10.2)
CHLORIDE BLD-SCNC: 97 MMOL/L (ref 98–107)
CHOLESTEROL, TOTAL: 143 MG/DL (ref 0–199)
CLARITY: CLEAR
CO2: 28 MMOL/L (ref 22–29)
COLOR: YELLOW
CREAT SERPL-MCNC: 1 MG/DL (ref 0.5–1)
EOSINOPHILS ABSOLUTE: 0.3 E9/L (ref 0.05–0.5)
EOSINOPHILS RELATIVE PERCENT: 2 % (ref 0–6)
EPITHELIAL CELLS, UA: ABNORMAL /HPF
GFR AFRICAN AMERICAN: >60
GFR NON-AFRICAN AMERICAN: 56 ML/MIN/1.73
GLUCOSE BLD-MCNC: 118 MG/DL (ref 74–99)
GLUCOSE URINE: NEGATIVE MG/DL
HBA1C MFR BLD: 7.1 % (ref 4–5.6)
HCT VFR BLD CALC: 42.9 % (ref 34–48)
HDLC SERPL-MCNC: 50 MG/DL
HEMOGLOBIN: 13.3 G/DL (ref 11.5–15.5)
IMMATURE GRANULOCYTES #: 0.23 E9/L
IMMATURE GRANULOCYTES %: 1.6 % (ref 0–5)
KETONES, URINE: NEGATIVE MG/DL
LDL CHOLESTEROL CALCULATED: 62 MG/DL (ref 0–99)
LEUKOCYTE ESTERASE, URINE: ABNORMAL
LYMPHOCYTES ABSOLUTE: 2.96 E9/L (ref 1.5–4)
LYMPHOCYTES RELATIVE PERCENT: 20.1 % (ref 20–42)
MCH RBC QN AUTO: 25.8 PG (ref 26–35)
MCHC RBC AUTO-ENTMCNC: 31 % (ref 32–34.5)
MCV RBC AUTO: 83.1 FL (ref 80–99.9)
MICROALBUMIN UR-MCNC: <12 MG/L
MONOCYTES ABSOLUTE: 0.88 E9/L (ref 0.1–0.95)
MONOCYTES RELATIVE PERCENT: 6 % (ref 2–12)
NEUTROPHILS ABSOLUTE: 10.27 E9/L (ref 1.8–7.3)
NEUTROPHILS RELATIVE PERCENT: 69.6 % (ref 43–80)
NITRITE, URINE: NEGATIVE
PDW BLD-RTO: 14.6 FL (ref 11.5–15)
PH UA: 7 (ref 5–9)
PLATELET # BLD: 357 E9/L (ref 130–450)
PMV BLD AUTO: 11 FL (ref 7–12)
POTASSIUM SERPL-SCNC: 4.1 MMOL/L (ref 3.5–5)
PROTEIN UA: NEGATIVE MG/DL
RBC # BLD: 5.16 E12/L (ref 3.5–5.5)
RBC UA: ABNORMAL /HPF (ref 0–2)
SODIUM BLD-SCNC: 135 MMOL/L (ref 132–146)
SPECIFIC GRAVITY UA: 1.01 (ref 1–1.03)
TOTAL PROTEIN: 7.4 G/DL (ref 6.4–8.3)
TRIGL SERPL-MCNC: 154 MG/DL (ref 0–149)
UROBILINOGEN, URINE: 0.2 E.U./DL
VLDLC SERPL CALC-MCNC: 31 MG/DL
WBC # BLD: 14.8 E9/L (ref 4.5–11.5)
WBC UA: ABNORMAL /HPF (ref 0–5)

## 2021-02-10 PROCEDURE — 85025 COMPLETE CBC W/AUTO DIFF WBC: CPT

## 2021-02-10 PROCEDURE — 82044 UR ALBUMIN SEMIQUANTITATIVE: CPT

## 2021-02-10 PROCEDURE — 80053 COMPREHEN METABOLIC PANEL: CPT

## 2021-02-10 PROCEDURE — 36415 COLL VENOUS BLD VENIPUNCTURE: CPT

## 2021-02-10 PROCEDURE — 81001 URINALYSIS AUTO W/SCOPE: CPT

## 2021-02-10 PROCEDURE — 80061 LIPID PANEL: CPT

## 2021-02-10 PROCEDURE — 83036 HEMOGLOBIN GLYCOSYLATED A1C: CPT

## 2021-02-11 ENCOUNTER — OFFICE VISIT (OUTPATIENT)
Dept: PRIMARY CARE CLINIC | Age: 66
End: 2021-02-11
Payer: COMMERCIAL

## 2021-02-11 VITALS
DIASTOLIC BLOOD PRESSURE: 78 MMHG | HEIGHT: 59 IN | WEIGHT: 212 LBS | BODY MASS INDEX: 42.74 KG/M2 | SYSTOLIC BLOOD PRESSURE: 134 MMHG | TEMPERATURE: 99.2 F

## 2021-02-11 DIAGNOSIS — M19.011 PRIMARY OSTEOARTHRITIS OF RIGHT SHOULDER: Chronic | ICD-10-CM

## 2021-02-11 DIAGNOSIS — Z79.4 TYPE 2 DIABETES MELLITUS WITHOUT COMPLICATION, WITH LONG-TERM CURRENT USE OF INSULIN (HCC): Primary | Chronic | ICD-10-CM

## 2021-02-11 DIAGNOSIS — M81.0 AGE-RELATED OSTEOPOROSIS WITHOUT CURRENT PATHOLOGICAL FRACTURE: ICD-10-CM

## 2021-02-11 DIAGNOSIS — I10 ESSENTIAL HYPERTENSION: Chronic | ICD-10-CM

## 2021-02-11 DIAGNOSIS — E11.9 TYPE 2 DIABETES MELLITUS WITHOUT COMPLICATION, WITH LONG-TERM CURRENT USE OF INSULIN (HCC): Primary | Chronic | ICD-10-CM

## 2021-02-11 DIAGNOSIS — J44.9 COPD WITHOUT EXACERBATION (HCC): Chronic | ICD-10-CM

## 2021-02-11 DIAGNOSIS — K04.7 DENTAL ABSCESS: ICD-10-CM

## 2021-02-11 PROCEDURE — 2022F DILAT RTA XM EVC RTNOPTHY: CPT | Performed by: FAMILY MEDICINE

## 2021-02-11 PROCEDURE — G8482 FLU IMMUNIZE ORDER/ADMIN: HCPCS | Performed by: FAMILY MEDICINE

## 2021-02-11 PROCEDURE — 1036F TOBACCO NON-USER: CPT | Performed by: FAMILY MEDICINE

## 2021-02-11 PROCEDURE — 3017F COLORECTAL CA SCREEN DOC REV: CPT | Performed by: FAMILY MEDICINE

## 2021-02-11 PROCEDURE — 3023F SPIROM DOC REV: CPT | Performed by: FAMILY MEDICINE

## 2021-02-11 PROCEDURE — G8926 SPIRO NO PERF OR DOC: HCPCS | Performed by: FAMILY MEDICINE

## 2021-02-11 PROCEDURE — G8428 CUR MEDS NOT DOCUMENT: HCPCS | Performed by: FAMILY MEDICINE

## 2021-02-11 PROCEDURE — G8417 CALC BMI ABV UP PARAM F/U: HCPCS | Performed by: FAMILY MEDICINE

## 2021-02-11 PROCEDURE — 3051F HG A1C>EQUAL 7.0%<8.0%: CPT | Performed by: FAMILY MEDICINE

## 2021-02-11 PROCEDURE — 99214 OFFICE O/P EST MOD 30 MIN: CPT | Performed by: FAMILY MEDICINE

## 2021-02-11 RX ORDER — AMOXICILLIN 500 MG/1
500 CAPSULE ORAL 3 TIMES DAILY
Qty: 21 CAPSULE | Refills: 0 | Status: SHIPPED | OUTPATIENT
Start: 2021-02-11 | End: 2021-02-18

## 2021-02-11 ASSESSMENT — ENCOUNTER SYMPTOMS
SHORTNESS OF BREATH: 1
ALLERGIC/IMMUNOLOGIC NEGATIVE: 1
GASTROINTESTINAL NEGATIVE: 1
EYES NEGATIVE: 1

## 2021-02-11 ASSESSMENT — PATIENT HEALTH QUESTIONNAIRE - PHQ9
SUM OF ALL RESPONSES TO PHQ QUESTIONS 1-9: 0
SUM OF ALL RESPONSES TO PHQ QUESTIONS 1-9: 0

## 2021-02-11 NOTE — PROGRESS NOTES
21  Name: Diane Lew    : 1955    Sex: female    Age: 60 y.o. Subjective:  Chief Complaint: Patient is here for 2 mo  Ck  Re  Copd  diab        Feel ok  No  Cp or sob     Lab with [facility]  Dec  Wt up  Ten lbs in 4 ,o  Got sthos knee s  wek ago  stefancin ofc  shoudler sore an dseeing ortho  Has a dental  Abscess --needs ab      Review of Systems   Constitutional: Negative. HENT: Negative. Eyes: Negative. Respiratory: Positive for shortness of breath (no chg). Cardiovascular: Negative. Gastrointestinal: Negative. Endocrine: Negative. Genitourinary: Negative. Musculoskeletal: Negative. Skin: Negative. Allergic/Immunologic: Negative. Neurological: Negative. Hematological: Negative. Psychiatric/Behavioral: Negative. Current Outpatient Medications:     amoxicillin (AMOXIL) 500 MG capsule, Take 1 capsule by mouth 3 times daily for 7 days, Disp: 21 capsule, Rfl: 0    fluticasone-umeclidin-vilant (TRELEGY ELLIPTA) 100-62.5-25 MCG/INH AEPB, Inhale 1 puff into the lungs daily, Disp: , Rfl:     fluticasone-umeclidin-vilant (TRELEGY ELLIPTA) 100-62.5-25 MCG/INH AEPB, Inhale 1 puff into the lungs daily, Disp: 2 each, Rfl: 3    blood glucose test strips (ASCENSIA AUTODISC VI;ONE TOUCH ULTRA TEST VI) strip, 1 each by In Vitro route 4 times daily As needed. , Disp: 400 each, Rfl: 12    Lancets MISC, 1 each by Does not apply route 4 times daily, Disp: 400 each, Rfl: 5    celecoxib (CELEBREX) 200 MG capsule, Take 1 capsule by mouth daily, Disp: 90 capsule, Rfl: 1    insulin glargine (BASAGLAR KWIKPEN) 100 UNIT/ML injection pen, Inject 25 Units into the skin nightly, Disp: 5 pen, Rfl: 12    citalopram (CELEXA) 40 MG tablet, Take 1 tablet by mouth daily Instructed to take am of procedure, Disp: 90 tablet, Rfl: 5    metFORMIN (GLUCOPHAGE) 500 MG tablet, Take 2 tablets by mouth 2 times daily (with meals) Take 2 tablets 2 times a day, Disp: 360 tablet, Rfl: 5   fluticasone (FLONASE) 50 MCG/ACT nasal spray, 1 spray by Each Nostril route daily, Disp: 1 Bottle, Rfl: 12    levothyroxine (SYNTHROID) 50 MCG tablet, Take 1 tablet by mouth Daily, Disp: 90 tablet, Rfl: 5    hydroCHLOROthiazide (MICROZIDE) 12.5 MG capsule, Take 1 capsule by mouth daily, Disp: 90 capsule, Rfl: 5    glimepiride (AMARYL) 1 MG tablet, Take 1 tablet by mouth 2 times daily (before meals), Disp: 180 tablet, Rfl: 12    albuterol sulfate HFA (PROAIR HFA) 108 (90 Base) MCG/ACT inhaler, INHALE TWO PUFFS BY MOUTH FOUR TIMES A DAY AS NEEDED, Disp: 25.5 g, Rfl: 12    gabapentin (NEURONTIN) 100 MG capsule, Take 1 capsule by mouth nightly for 360 days. , Disp: 90 capsule, Rfl: 3    insulin aspart (NOVOLOG FLEXPEN) 100 UNIT/ML injection pen, Less than 120 no insulin, 121 -200 take 10 units, 201-300 take 15 units, over 301 take 20 units--- before each meal, Disp: 5 pen, Rfl: 3    Insulin Pen Needle (PEN NEEDLES 5/16\") 30G X 8 MM MISC, 1 each by Does not apply route daily Insulin 4 times a day.   May change to what ever type of needle is available, Disp: 100 each, Rfl: 3    aspirin 325 MG EC tablet, Take 1 tablet by mouth 2 times daily, Disp: 84 tablet, Rfl: 0    docusate sodium (COLACE) 100 MG capsule, Take 1 capsule by mouth 2 times daily as needed for Constipation, Disp: 30 capsule, Rfl: 1    TURMERIC PO, Take by mouth 2 times daily LD 2-14-20, Disp: , Rfl:     BIOTIN PO, Take by mouth daily LD 2-14-20, Disp: , Rfl:     ipratropium-albuterol (DUONEB) 0.5-2.5 (3) MG/3ML SOLN nebulizer solution, Inhale 1 vial into the lungs every 4 hours as needed for Shortness of Breath Instructed to take am of procedure if needed, Disp: , Rfl:     loratadine (CLARITIN) 10 MG capsule, Take 10 mg by mouth daily, Disp: , Rfl:     Cholecalciferol (VITAMIN D3) 2000 units CAPS, Take 5,000 Units by mouth daily Ld 2-14-20, Disp: , Rfl:     Coenzyme Q10 (COQ-10) 10 MG CAPS, Take by mouth daily Ld 2-14-20, Disp: , Rfl: Allergies   Allergen Reactions    Macrobid [Nitrofurantoin Macrocrystal] Other (See Comments)     Exacerbates asthma     Social History     Socioeconomic History    Marital status:      Spouse name: Not on file    Number of children: Not on file    Years of education: Not on file    Highest education level: Not on file   Occupational History    Not on file   Social Needs    Financial resource strain: Not on file    Food insecurity     Worry: Not on file     Inability: Not on file    Transportation needs     Medical: Not on file     Non-medical: Not on file   Tobacco Use    Smoking status: Former Smoker     Packs/day: 1.00     Years: 30.00     Pack years: 30.00     Quit date: 05/2017     Years since quitting: 3.7    Smokeless tobacco: Never Used   Substance and Sexual Activity    Alcohol use: Yes     Comment: rare    Drug use: Never    Sexual activity: Not on file   Lifestyle    Physical activity     Days per week: Not on file     Minutes per session: Not on file    Stress: Not on file   Relationships    Social connections     Talks on phone: Not on file     Gets together: Not on file     Attends Mu-ism service: Not on file     Active member of club or organization: Not on file     Attends meetings of clubs or organizations: Not on file     Relationship status: Not on file    Intimate partner violence     Fear of current or ex partner: Not on file     Emotionally abused: Not on file     Physically abused: Not on file     Forced sexual activity: Not on file   Other Topics Concern    Not on file   Social History Narrative        OBESITY    SMOKER----QUIT 11-09------BACK------ QUIT 2-18    [facility] CAD    LEFT SDIED [address] [place] 2006    OA KNEES    EDEMA LEGS    [facility] DM    LEUKOCYTOSIS--DR BARGER IN PAST--THEN DR Nathen Chapin 2005    DEPRESSION    NIDDM    HTN    ELEV CRP    EARLY HYPOTHYROID    ASTHMA    SLEEP APNEA------C-PAP    D AND C 1-09----DR PALMA TORN MENISCUS L KNEE OR 2000 DR DIEHL------SEVERE OA L KNEE ON X RAY 9-11    EMG 9-14 SEVERE CTS L MOD-SEV R---REFER TO DR STEPHENSON    INJECTIONS KNEE DR GTZ    LEFT TOTAL KNEE OR 2-15 DR GTZ--    R CARPAL TUNNEL SURGEYR 3-15 DR GTZ    VITROUS SEPARATION LEFT EYE 11-15    R SHOULDER OR TORN ROTATOR CUFF 6-16 DR SURESH---PT NOT HAPPY    PT MOVED BACK HOME 2-17    VENTRAL HERNIA OR DR Hernandez Dueremigio 9-17    EVAL DR MARROQUIN 8-18 COPD ADDED O2    RETIRED 9-18 DISABILITY 1-19    Left carpal tunnel  7-19 surgery    Right total shoulder arthroplasty   Dr Pito Kent      Past Medical History:   Diagnosis Date    Arthritis     Asthma     COPD (chronic obstructive pulmonary disease) (HCC)     uses O2 NC 2 L continuous     Depression     Diabetes mellitus (HCC)     Edema leg     Hypertension     Hypothyroidism     Leukocytosis     Obesity     Osteoarthritis     Sleep apnea     no CPAP     Thyroid disease     Type 2 diabetes mellitus without complication ([facility])      Family History   Problem Relation Age of Onset    Stroke Mother     Diabetes Type 1  Mother     Heart Attack Father 79      Past Surgical History:   Procedure Laterality Date    CARPAL TUNNEL RELEASE Left 7/1/2019    LEFT CARPAL TUNNEL RELEASE performed [facility] 3    DILATION AND CURETTAGE OF UTERUS      HERNIA REPAIR      ROTATOR CUFF REPAIR Right     SHOULDER SURGERY Right 2/21/2020    RIGHT SHOULDER TOTAL ARTHROPLASTY REVERSE WITH ISB performed by Dortha Landau, MD at [address] Left     TUBAL LIGATION        Vitals:    02/11/21 1331   BP: 134/78   Temp: 99.2 °F (37.3 °C)   TempSrc: Oral   Weight: 212 lb (96.2 kg)   Height: 4' 11\" (1.499 m)       Objective:    Physical Exam  Vitals signs reviewed. Constitutional:       Appearance: She is well-developed. HENT:      Head: Normocephalic. Eyes:      Pupils: Pupils are equal, round, and reactive to light. Neck:      Musculoskeletal: Normal range of motion. Cardiovascular:      Rate and Rhythm: Normal rate and regular rhythm. Pulmonary:      Effort: Pulmonary effort is normal.      Breath sounds: Wheezing (faint ) present. Abdominal:      Palpations: Abdomen is soft. Musculoskeletal: Normal range of motion. Skin:     General: Skin is warm. Neurological:      Mental Status: She is alert and oriented to person, place, and time. Psychiatric:         Behavior: Behavior normal.         Tiffany Colbert was seen today for discuss labs. Diagnoses and all orders for this visit:    Type 2 diabetes mellitus without complication, with long-term current use of insulin (HCC)    Essential hypertension    COPD without exacerbation (HCC)    Primary osteoarthritis of right shoulder    Dental abscess  -     amoxicillin (AMOXIL) 500 MG capsule; Take 1 capsule by mouth 3 times daily for 7 days        Comments: low fat and sguar diet   A great deal of time spent reviewing medications, diet, exercise, social issues. Also reviewing the chart before entering the room with patient and finishing charting after leaving patient's room. More than half of that time was spent face to face with the patient in counseling and coordinating care.    kck bp home daily  Ck bs iid  Call for [facility] when  On    [facility]   Ab for dental   fuwith orhto        Follow Up: Return in about 4 months (around 6/11/2021) for Lab Before.      Seen by:  Consuelo Kendrick DO

## 2021-03-08 DIAGNOSIS — Z79.4 TYPE 2 DIABETES MELLITUS WITHOUT COMPLICATION, WITH LONG-TERM CURRENT USE OF INSULIN (HCC): Primary | ICD-10-CM

## 2021-03-08 DIAGNOSIS — E11.9 TYPE 2 DIABETES MELLITUS WITHOUT COMPLICATION, WITH LONG-TERM CURRENT USE OF INSULIN (HCC): Primary | ICD-10-CM

## 2021-03-08 RX ORDER — GLUCOSAMINE HCL/CHONDROITIN SU 500-400 MG
CAPSULE ORAL
Qty: 400 STRIP | Refills: 0 | Status: SHIPPED
Start: 2021-03-08 | End: 2022-10-24 | Stop reason: SDUPTHER

## 2021-03-09 RX ORDER — DIPHENHYDRAMINE HYDROCHLORIDE 25 MG/1
CAPSULE, LIQUID FILLED ORAL
Qty: 1 KIT | Refills: 0 | Status: SHIPPED | OUTPATIENT
Start: 2021-03-09

## 2021-03-12 DIAGNOSIS — Z79.4 TYPE 2 DIABETES MELLITUS WITHOUT COMPLICATION, WITH LONG-TERM CURRENT USE OF INSULIN (HCC): ICD-10-CM

## 2021-03-12 DIAGNOSIS — E11.9 TYPE 2 DIABETES MELLITUS WITHOUT COMPLICATION, WITH LONG-TERM CURRENT USE OF INSULIN (HCC): ICD-10-CM

## 2021-03-12 NOTE — TELEPHONE ENCOUNTER
Patient needs refills of insulin pen needles. Pended. She also needs a new rx sent in for One touch ultra 2 lancets.  The lancets she was prescribed in December do not fit her new machine

## 2021-03-13 RX ORDER — PEN NEEDLE, DIABETIC 30 GX5/16"
1 NEEDLE, DISPOSABLE MISCELLANEOUS DAILY
Qty: 100 EACH | Refills: 3 | Status: SHIPPED
Start: 2021-03-13 | End: 2021-03-15 | Stop reason: SDUPTHER

## 2021-03-15 DIAGNOSIS — Z79.4 TYPE 2 DIABETES MELLITUS WITHOUT COMPLICATION, WITH LONG-TERM CURRENT USE OF INSULIN (HCC): ICD-10-CM

## 2021-03-15 DIAGNOSIS — E11.9 TYPE 2 DIABETES MELLITUS WITHOUT COMPLICATION, WITH LONG-TERM CURRENT USE OF INSULIN (HCC): ICD-10-CM

## 2021-03-15 RX ORDER — PEN NEEDLE, DIABETIC 30 GX5/16"
1 NEEDLE, DISPOSABLE MISCELLANEOUS DAILY
Qty: 100 EACH | Refills: 3 | Status: SHIPPED
Start: 2021-03-15 | End: 2021-08-23 | Stop reason: SDUPTHER

## 2021-03-16 RX ORDER — LANCETS 30 GAUGE
1 EACH MISCELLANEOUS 4 TIMES DAILY
Qty: 400 EACH | Refills: 5 | Status: SHIPPED
Start: 2021-03-16 | End: 2022-06-22 | Stop reason: SDUPTHER

## 2021-03-24 ENCOUNTER — FOLLOWUP TELEPHONE ENCOUNTER (OUTPATIENT)
Dept: DIABETES SERVICES | Age: 66
End: 2021-03-24

## 2021-03-24 NOTE — PROGRESS NOTES
Diabetes Self-Management Education Record    Participant Name: Prema Jimenez Page  Referring Provider: Krista Woodard DO  Assessment/Evaluation Ratings:  1=Needs Instruction   4=Demonstrates Understanding/Competency  2=Needs Review   NC=Not Covered    3=Comprehends Key Points  N/A=Not Applicable  Topics/Learning Objectives Pre-session Assess Date:  12/9/20 KMS Instr. Date Follow-up Date Post- session Eval Comments   Diabetes disease process & Treatment process:   -Define diabetes & prediabetes and ABCs of     diabetes   -Identify own type of diabetes  -Identify lifestyle changes/treatment options 2 12/9/20 KMS  4 Type 2 DM for about 10 years  A1C 11.1%    Developing strategies for Healthy coping/psychosocial issues:    -Describe feelings about living with diabetes  -Identify coping strategies;   -Identify support needed & support network available 1 12/9/20 KMS  3     12/9/20 KMS  PHQ-2 Depression Screen Score: 1  PHQ-9 Score: 4     Prevention, detection & treatment of Chronic complications:    -Identify the prevention, detection and treatment for complications including immunizations, preventive eye, foot, dental and renal exams as indicated per the participant's duration of diabetes and health status.  -Define the natural course of diabetes and the relationship of blood glucose levels to long term complications of diabetes.   2 12/9/20 KMS  4    Prevention, detection & treatment of acute complications:    -List symptoms of hyper & hypoglycemia, and prevention & treatment strategies.   -Describe sick day guidelines  DKA /indications for ketone testing &  when to call physician  1 12/9/20 KMS  4    Identify severe weather/situation crisis  & diabetes supplies management        Using medications safely:   -State effects of diabetes medicines on blood glucose levels;  -List diabetes medication taken, action & side effects 2 12/9/20 KMS  4 Basaglar 25 units at night (just increased)  Novolog sliding scale TID Insulin/Injectables  -Name appropriate injection sites; proper storage; supplies needed;  2 12/9/20 KMS  4     Demonstrate proper technique        Monitoring blood glucose, interpreting and using results:   -Identify recommended & personal blood glucose targets & HgbA1C target levels  -State the Importance of logging blood glucose levels for pattern recognition;   -State benefits of reading/using pt generated health data  -Verbalize safe lancet disposal 2 12/9/20 KMS  4 Monitors before meals and occasionally before bedtime. Keeps a glucose log.    -Demonstrate proper testing technique        Incorporating physical activity into lifestyle:   -State effect of exercise on blood glucose levels;   -State benefits of regular exercise;   -Define safety considerations;  -Describe contraindications/maintenance of activity. 1 12/9/20 KMS  4 Just started doing exercise videos involving stretching and light hand weights. Limited by knee pain. Will aim for 10 minutes of activity 3 times per day 5 days per week. Incorporating nutritional management into lifestyle:   -Describe effect of type, amount & timing of food on blood glucose  -Describe methods for preparing and planning healthy meals  Correctly read food labels 1 12/9/20 KMS  4 Verbalizes a good understanding of the plate method and label reading. Will work on spacing out meals and snacks with 2/2/2 carb servings at meals and 1 carb serving between meals and at HS--meal plan prepared by DEXTER Gaitan, RDN, LD. Plan personal carbohydrate-controlled meal based on individualized meal plan  Demonstrate CHO counting/portion control         Developing strategies for problem solving to promote health/change behavior. -Identify 7 self-care behaviors; Personal health risk factors; Benefits, challenges & strategies for behavioral change and set an individualized goal selection.  1 12/9/20 KMS  4 Goal:  I will follow my meal plan and increase activity to 10 minutes 3

## 2021-06-22 ENCOUNTER — TELEPHONE (OUTPATIENT)
Dept: PHARMACY | Facility: CLINIC | Age: 66
End: 2021-06-22

## 2021-06-22 NOTE — TELEPHONE ENCOUNTER
[facility] CLINICAL PHARMACY: STATIN THERAPY REVIEW  Identified statin use in persons with diabetes care gap per [employer]. Records dated: 6/9/21. Will route to PharmD candidate to review.     Atif Hooks, PharmD, [facility]  Direct: [phone]  Department, toll free [phone], option 7

## 2021-06-23 NOTE — TELEPHONE ENCOUNTER
[facility] CLINICAL PHARMACY: STATIN THERAPY REVIEW  Identified statin use in persons with diabetes care gap per [employer]. Records dated:06/05/21    Last Office Visit:04/06/21     Patient also appears to be taking: Insulin Glargine, metformin, glimepiride, insulin Novolog     Patient not found in Outcomes MTM   Pt is not on Insurance record     BP Readings from Last 3 Encounters:   04/06/21 (!) 140/80   02/11/21 134/78   01/26/21 (!) 154/88     CrCl cannot be calculated (Patient's most recent lab result is older than the maximum 120 days allowed. ). DIABETES ADHERENCE    Per Reconciled Dispense Report:  Metformin 500 mg  last filled on 04/02/21 for 30  days supply. Glimepiride 1 mg last filled on 11/20/20 for 90 days supply     Lab Results   Component Value Date    LABA1C 7.1 (H) 02/10/2021    LABA1C 11.1 (H) 10/28/2020    LABA1C 8.4 (H) 08/29/2019     NOTE A1c <9%    STATIN GAP IDENTIFIED    Per chart review, patient is not prescribed a statin    Lab Results   Component Value Date    CHOL 143 02/10/2021    TRIG 154 (H) 02/10/2021    HDL 50 02/10/2021    LDLCALC 62 02/10/2021     ALT   Date Value Ref Range Status   02/10/2021 13 0 - 32 U/L Final     AST   Date Value Ref Range Status   02/10/2021 14 0 - 31 U/L Final       The 10-year ASCVD risk score (Sarita Rico, et al., 2013) is: 14.5%    Values used to calculate the score:      Age: 73 years      Sex: Female      Is Non- : No      Diabetic: Yes      Tobacco smoker: No      Systolic Blood Pressure: 489 mmHg      Is BP treated: Yes      HDL Cholesterol: 50 mg/dL      Total Cholesterol: 143 mg/dL     Hyperlipidemia Goal: Patient has a 10-yr ASCVD risk of >7.5% with DM and is therefore a candidate for moderate-intensity statin therapy based on updated guidelines. 2019 ADA Guidelines Age:65    72>/= 36years old:   No history of ASCVD or 10-year ASCVD risk < 20% - moderate-intensity statin is recommended.      PLAN  Pt is a good candidate for moderate intensity statin, per cardiologist, moderate intensity statin was recommended to Pt on 10/03/2019  \"With her age and diagnosis of diabetes, most recent guidelines do recommend moderate intensity statin for primary prevention of ASCVD. She would like to further discuss this with Dr. Gracy Ortiz. \"     Per PCP, Pt has refused to take statin. \" d iet exer  patiet  Refuses  Statin therapy  lsoe wt  exer   A great deal of time spent reviewing medications, diet, exercise, social issues\"   Per chart review Pt has never been on statin. Will call patient and discuss benefit of statin. Pt has upcoming appointment in 06/29/21    I have called [facility], confirmed that metformin 500 mg  was last filled on 05/29/21 for 30 days supply. Glimepiride 1 mg was last filled on 05/20/21 for 90 days supply. Pharmacy has confirmed that [employer] was used for billing. Attempted to call Pt regarding statin therapy, Pt did not picked up. Will again later.      Kei CHRISTENSEN candidate 2022  [phone] opt7

## 2021-06-25 NOTE — TELEPHONE ENCOUNTER
Attempted to call Patient again, unable to reach her. She did not  her phone   Will route this case to pharmacist to sign off.     Lisandro CHRISTENSEN candidate 2022  [phone] opt7

## 2021-06-25 NOTE — TELEPHONE ENCOUNTER
Noted outreach attempts. Will continue to follow and will send statin recommendation on 6/29/21 prior to OV with PCP for consideration.      Angela Norton, PharmD, [facility]  Direct: [phone]  Department, toll free [phone], option 7

## 2021-06-28 DIAGNOSIS — M81.0 AGE-RELATED OSTEOPOROSIS WITHOUT CURRENT PATHOLOGICAL FRACTURE: ICD-10-CM

## 2021-06-28 DIAGNOSIS — E11.9 TYPE 2 DIABETES MELLITUS WITHOUT COMPLICATION, WITH LONG-TERM CURRENT USE OF INSULIN (HCC): Chronic | ICD-10-CM

## 2021-06-28 DIAGNOSIS — I10 ESSENTIAL HYPERTENSION: Chronic | ICD-10-CM

## 2021-06-28 DIAGNOSIS — Z79.4 TYPE 2 DIABETES MELLITUS WITHOUT COMPLICATION, WITH LONG-TERM CURRENT USE OF INSULIN (HCC): Chronic | ICD-10-CM

## 2021-06-28 LAB
ALBUMIN SERPL-MCNC: 4.1 G/DL (ref 3.5–5.2)
ALP BLD-CCNC: 89 U/L (ref 35–104)
ALT SERPL-CCNC: 12 U/L (ref 0–32)
ANION GAP SERPL CALCULATED.3IONS-SCNC: 22 MMOL/L (ref 7–16)
AST SERPL-CCNC: 15 U/L (ref 0–31)
BASOPHILS ABSOLUTE: 0.09 E9/L (ref 0–0.2)
BASOPHILS RELATIVE PERCENT: 0.6 % (ref 0–2)
BILIRUB SERPL-MCNC: 0.3 MG/DL (ref 0–1.2)
BUN BLDV-MCNC: 15 MG/DL (ref 6–23)
CALCIUM SERPL-MCNC: 9.4 MG/DL (ref 8.6–10.2)
CHLORIDE BLD-SCNC: 94 MMOL/L (ref 98–107)
CHOLESTEROL, TOTAL: 184 MG/DL (ref 0–199)
CO2: 22 MMOL/L (ref 22–29)
CREAT SERPL-MCNC: 0.9 MG/DL (ref 0.5–1)
EOSINOPHILS ABSOLUTE: 0.3 E9/L (ref 0.05–0.5)
EOSINOPHILS RELATIVE PERCENT: 2.1 % (ref 0–6)
GFR AFRICAN AMERICAN: >60
GFR NON-AFRICAN AMERICAN: >60 ML/MIN/1.73
GLUCOSE BLD-MCNC: 225 MG/DL (ref 74–99)
HBA1C MFR BLD: 6.5 % (ref 4–5.6)
HCT VFR BLD CALC: 40.1 % (ref 34–48)
HDLC SERPL-MCNC: 47 MG/DL
HEMOGLOBIN: 12.2 G/DL (ref 11.5–15.5)
IMMATURE GRANULOCYTES #: 0.16 E9/L
IMMATURE GRANULOCYTES %: 1.1 % (ref 0–5)
LDL CHOLESTEROL CALCULATED: 92 MG/DL (ref 0–99)
LYMPHOCYTES ABSOLUTE: 2.97 E9/L (ref 1.5–4)
LYMPHOCYTES RELATIVE PERCENT: 20.4 % (ref 20–42)
MCH RBC QN AUTO: 25.8 PG (ref 26–35)
MCHC RBC AUTO-ENTMCNC: 30.4 % (ref 32–34.5)
MCV RBC AUTO: 85 FL (ref 80–99.9)
MICROALBUMIN UR-MCNC: 34.2 MG/L
MONOCYTES ABSOLUTE: 0.74 E9/L (ref 0.1–0.95)
MONOCYTES RELATIVE PERCENT: 5.1 % (ref 2–12)
NEUTROPHILS ABSOLUTE: 10.3 E9/L (ref 1.8–7.3)
NEUTROPHILS RELATIVE PERCENT: 70.7 % (ref 43–80)
PDW BLD-RTO: 14.7 FL (ref 11.5–15)
PLATELET # BLD: 309 E9/L (ref 130–450)
PMV BLD AUTO: 12.3 FL (ref 7–12)
POTASSIUM SERPL-SCNC: 4.4 MMOL/L (ref 3.5–5)
RBC # BLD: 4.72 E12/L (ref 3.5–5.5)
SODIUM BLD-SCNC: 138 MMOL/L (ref 132–146)
TOTAL PROTEIN: 7.6 G/DL (ref 6.4–8.3)
TRIGL SERPL-MCNC: 225 MG/DL (ref 0–149)
VLDLC SERPL CALC-MCNC: 45 MG/DL
WBC # BLD: 14.6 E9/L (ref 4.5–11.5)

## 2021-06-29 ENCOUNTER — TELEPHONE (OUTPATIENT)
Dept: PRIMARY CARE CLINIC | Age: 66
End: 2021-06-29

## 2021-06-29 ENCOUNTER — OFFICE VISIT (OUTPATIENT)
Dept: PRIMARY CARE CLINIC | Age: 66
End: 2021-06-29
Payer: MEDICARE

## 2021-06-29 VITALS
HEIGHT: 64 IN | WEIGHT: 226.2 LBS | BODY MASS INDEX: 38.62 KG/M2 | HEART RATE: 104 BPM | DIASTOLIC BLOOD PRESSURE: 84 MMHG | OXYGEN SATURATION: 90 % | RESPIRATION RATE: 16 BRPM | TEMPERATURE: 97.1 F | SYSTOLIC BLOOD PRESSURE: 138 MMHG

## 2021-06-29 DIAGNOSIS — E66.01 SEVERE OBESITY (BMI 35.0-35.9 WITH COMORBIDITY) (HCC): ICD-10-CM

## 2021-06-29 DIAGNOSIS — E11.9 TYPE 2 DIABETES MELLITUS WITHOUT COMPLICATION, WITH LONG-TERM CURRENT USE OF INSULIN (HCC): Chronic | ICD-10-CM

## 2021-06-29 DIAGNOSIS — Z79.4 TYPE 2 DIABETES MELLITUS WITHOUT COMPLICATION, WITH LONG-TERM CURRENT USE OF INSULIN (HCC): Chronic | ICD-10-CM

## 2021-06-29 DIAGNOSIS — D72.828 OTHER ELEVATED WHITE BLOOD CELL (WBC) COUNT: ICD-10-CM

## 2021-06-29 DIAGNOSIS — G47.33 SLEEP APNEA, OBSTRUCTIVE: ICD-10-CM

## 2021-06-29 DIAGNOSIS — I45.10 RBBB: ICD-10-CM

## 2021-06-29 DIAGNOSIS — I10 ESSENTIAL HYPERTENSION: Chronic | ICD-10-CM

## 2021-06-29 DIAGNOSIS — J44.9 COPD WITHOUT EXACERBATION (HCC): Chronic | ICD-10-CM

## 2021-06-29 DIAGNOSIS — M81.0 AGE-RELATED OSTEOPOROSIS WITHOUT CURRENT PATHOLOGICAL FRACTURE: ICD-10-CM

## 2021-06-29 DIAGNOSIS — Z00.00 ROUTINE GENERAL MEDICAL EXAMINATION AT A HEALTH CARE FACILITY: Primary | ICD-10-CM

## 2021-06-29 DIAGNOSIS — R00.2 PALPITATIONS: ICD-10-CM

## 2021-06-29 LAB — VITAMIN D 25-HYDROXY: 100 NG/ML (ref 30–100)

## 2021-06-29 PROCEDURE — 99214 OFFICE O/P EST MOD 30 MIN: CPT | Performed by: FAMILY MEDICINE

## 2021-06-29 RX ORDER — BLOOD-GLUCOSE SENSOR
1 EACH MISCELLANEOUS
Qty: 1 EACH | Refills: 12 | Status: SHIPPED
Start: 2021-06-29 | End: 2021-07-29

## 2021-06-29 RX ORDER — BLOOD-GLUCOSE TRANSMITTER
1 EACH MISCELLANEOUS
Qty: 200 EACH | Refills: 12 | Status: SHIPPED
Start: 2021-06-29 | End: 2021-07-29

## 2021-06-29 ASSESSMENT — PATIENT HEALTH QUESTIONNAIRE - PHQ9
2. FEELING DOWN, DEPRESSED OR HOPELESS: 0
SUM OF ALL RESPONSES TO PHQ QUESTIONS 1-9: 0
SUM OF ALL RESPONSES TO PHQ QUESTIONS 1-9: 0
SUM OF ALL RESPONSES TO PHQ9 QUESTIONS 1 & 2: 0
SUM OF ALL RESPONSES TO PHQ QUESTIONS 1-9: 0
1. LITTLE INTEREST OR PLEASURE IN DOING THINGS: 0

## 2021-06-29 ASSESSMENT — LIFESTYLE VARIABLES
AUDIT-C TOTAL SCORE: INCOMPLETE
HOW OFTEN DO YOU HAVE A DRINK CONTAINING ALCOHOL: NEVER
HOW OFTEN DO YOU HAVE A DRINK CONTAINING ALCOHOL: 0
AUDIT TOTAL SCORE: INCOMPLETE

## 2021-06-29 NOTE — TELEPHONE ENCOUNTER
Jose Waite, DO - would patient benefit from statin therapy? Patient with DM, over 40, TG = 225 and ASCVD risk score = 16.8%. Per ADA guidelines, patient is a candidate for moderate-intensity statin therapy. Would recommend trial of atorvastatin 10 or 20 mg or rosuvastatin 5 or 10 mg daily. Patient has OV with you today.      Lab Results   Component Value Date    CHOL 184 06/28/2021    TRIG 225 (H) 06/28/2021    HDL 47 06/28/2021    LDLCALC 92 06/28/2021     ALT   Date Value Ref Range Status   06/28/2021 12 0 - 32 U/L Final     AST   Date Value Ref Range Status   06/28/2021 15 0 - 31 U/L Final       The 10-year ASCVD risk score (Belinda Noble, et al., 2013) is: 16.8%    Values used to calculate the score:      Age: 73 years      Sex: Female      Is Non- : No      Diabetic: Yes      Tobacco smoker: No      Systolic Blood Pressure: 706 mmHg      Is BP treated: Yes      HDL Cholesterol: 47 mg/dL      Total Cholesterol: 184 mg/dL     Atif Hooks, PharmD, [facility]  Direct: [phone]  Department, toll free [phone], option 7

## 2021-06-29 NOTE — PROGRESS NOTES
Medicare Annual Wellness Visit  Name: Stacia Floor Date: 2021   MRN: [id number] Sex: Female   Age: 73 y.o. Ethnicity: Non-/Non    : 1955 Race: Dorie Amador is here for Medicare AWV      Copd  diabets   Sleep apnea    She ask to chg pulm to sara   She  nto use  cpap in 20 yrs    Tired easy     Sob with exeriton  wering o 2 at   2 lpalp at tiems    Last ekg  With rbbb    Wbc up           Screenings for behavioral, psychosocial and functional/safety risks, and cognitive dysfunction are all negative except as indicated below. These results, as well as other patient data from the [address] form, are documented in Flowsheets linked to this Encounter. Allergies   Allergen Reactions    Macrobid [Nitrofurantoin Macrocrystal] Other (See Comments)     Exacerbates asthma         Prior to Visit Medications    Medication Sig Taking? Authorizing Provider   metFORMIN (GLUCOPHAGE) 500 MG tablet Take 1 tablet by mouth 2 times daily (with meals) Take 2 tablets 2 times a day Yes Adi Sesay, DO   Continuous Blood Gluc Sensor (DEXCOM G6 SENSOR) MISC 1 Units by Does not apply route every 2 hours Yes Adi Sesay, DO   Continuous Blood Gluc Transmit (DEXCOM G6 TRANSMITTER) MISC 1 Units by Does not apply route every 2 hours Yes Adi Sesay DO   Lancets MISC 1 each by Does not apply route 4 times daily Yes Adi Sesay DO   Insulin Pen Needle (PEN NEEDLES 5/16\") 30G X 8 MM MISC 1 each by Does not apply route daily Insulin 4 times a day. May change to what ever type of needle is available Yes Adi Sesay, DO   Blood Glucose Monitoring Suppl (BLOOD GLUCOSE MONITOR SYSTEM) w/Device KIT One Touch Ultra monitor Yes Adi Sesay, DO   blood glucose monitor strips Test 4 times a day & as needed for symptoms of irregular blood glucose.  Yes Adi Sesay, DO   fluticasone-umeclidin-vilant (TRELEGY ELLIPTA) 100-62.5-25 MCG/INH AEPB Inhale 1 puff into the lungs daily Yes Historical Provider, MD   fluticasone-umeclidin-vilant (TRELEGY ELLIPTA) 100-62.5-25 MCG/INH AEPB Inhale 1 puff into the lungs daily Yes ERIK Melendez - CNP   blood glucose test strips (ASCENSIA AUTODISC VI;ONE TOUCH ULTRA TEST VI) strip 1 each by In Vitro route 4 times daily As needed. Yes Adi Sesay DO   celecoxib (CELEBREX) 200 MG capsule Take 1 capsule by mouth daily Yes Adi Sesay DO   insulin glargine (BASAGLAR KWIKPEN) 100 UNIT/ML injection pen Inject 25 Units into the skin nightly Yes Adi Sesay DO   citalopram (CELEXA) 40 MG tablet Take 1 tablet by mouth daily Instructed to take am of procedure Yes Adi Sesay DO   fluticasone (FLONASE) 50 MCG/ACT nasal spray 1 spray by Each Nostril route daily Yes Adi Sesay DO   levothyroxine (SYNTHROID) 50 MCG tablet Take 1 tablet by mouth Daily Yes Adi Sesay DO   hydroCHLOROthiazide (MICROZIDE) 12.5 MG capsule Take 1 capsule by mouth daily Yes Adi Sesay DO   glimepiride (AMARYL) 1 MG tablet Take 1 tablet by mouth 2 times daily (before meals) Yes Adi Sesay DO   albuterol sulfate HFA (PROAIR HFA) 108 (90 Base) MCG/ACT inhaler INHALE TWO PUFFS BY MOUTH FOUR TIMES A DAY AS NEEDED Yes Adi Sesay DO   gabapentin (NEURONTIN) 100 MG capsule Take 1 capsule by mouth nightly for 360 days.  Yes Adi Sesay DO   insulin aspart (NOVOLOG FLEXPEN) 100 UNIT/ML injection pen Less than 120 no insulin, 121 -200 take 10 units, 201-300 take 15 units, over 301 take 20 units--- before each meal Yes Adi Sesay DO   docusate sodium (COLACE) 100 MG capsule Take 1 capsule by mouth 2 times daily as needed for Constipation Yes Rohini Rinaldi PA-C   TURMERIC PO Take by mouth 2 times daily LD 2-14-20 Yes Historical Provider, MD   BIOTIN PO Take by mouth daily LD 2-14-20 Yes Historical Provider, MD   ipratropium-albuterol (DUONEB) 0.5-2.5 (3) MG/3ML SOLN nebulizer solution Inhale 1 vial into the lungs every 4 hours as needed for Shortness of Breath Instructed to take am of procedure if needed Yes Historical Provider, MD   loratadine (CLARITIN) 10 MG capsule Take 10 mg by mouth daily Yes Historical Provider, MD   Cholecalciferol (VITAMIN D3) 2000 units CAPS Take 5,000 Units by mouth daily Ld 2-14-20 Yes Historical Provider, MD   Coenzyme Q10 (COQ-10) 10 MG CAPS Take by mouth daily Ld 2-14-20 Yes Historical Provider, MD   aspirin 325 MG EC tablet Take 1 tablet by mouth 2 times daily  Rohini Rinaldi PA-C         Past Medical History:   Diagnosis Date    Arthritis     Asthma     COPD [facility]chronic obstructive pulmonary disease) ([facility]     uses O2 NC 2 L continuous     Depression     Diabetes mellitus ([facility]     Edema leg     Hypertension     Hypothyroidism     Leukocytosis     Obesity     Osteoarthritis     Sleep apnea     no CPAP     Thyroid disease     Type 2 diabetes mellitus without complication ([facility]        Past Surgical History:   Procedure Laterality Date    CARPAL TUNNEL RELEASE Left 7/1/2019    LEFT CARPAL TUNNEL RELEASE [facility]    DILATION AND CURETTAGE OF UTERUS      HERNIA REPAIR      ROTATOR CUFF REPAIR Right     SHOULDER SURGERY Right 2/21/2020    RIGHT SHOULDER TOTAL ARTHROPLASTY REVERSE WITH ISB performed [facility]    TONSILLECTOMY AND ADENOIDECTOMY      TOTAL KNEE ARTHROPLASTY Left     TUBAL LIGATION           Family History   Problem Relation Age of Onset    Stroke Mother     Diabetes Type 1  Mother     Heart Attack Father 79       CareTeam (Including outside providers/suppliers regularly involved in providing care):   Patient Care Team:  Fabio Wall DO as PCP - General (Family Medicine)  Fabio Wall DO as PCP - [facility] Empaneled Provider  Charla White MD as Consulting Physician (Pulmonology)  Jon Valdivia MD as Consulting Physician (Pulmonology)    Wt Readings from Last 3 Encounters:   06/29/21 226 lb 3.2 oz (102.6 kg)   04/06/21 223 lb (101.2 kg)   02/11/21 212 lb (96.2 kg)     Vitals:    06/29/21 1431   BP: 138/84   Pulse: 104   Resp: 16   Temp: 97.1 °F (36.2 °C)   SpO2: 90%   Weight: 226 lb 3.2 oz (102.6 kg)   Height: 5' 4\" (1.626 m)     Body mass index is 38.83 kg/m². Based upon direct observation of the patient, evaluation of cognition reveals recent and remote memory intact. General Appearance: alert and oriented to person, place and time, well developed and well- nourished, in no acute distress  Skin: warm and dry, no rash or erythema  Head: normocephalic and atraumatic  Eyes: pupils equal, round, and reactive to light, extraocular eye movements intact, conjunctivae normal  ENT: tympanic membrane, external ear and ear canal normal bilaterally, nose without deformity, nasal mucosa and turbinates normal without polyps  Neck: supple and non-tender without mass, no thyromegaly or thyroid nodules, no cervical lymphadenopathy  Pulmonary/Chest: clear to auscultation bilaterally- no wheezes, rales or rhonchi, normal air movement, no respiratory distress  Cardiovascular: normal rate, regular rhythm, normal S1 and S2, no murmurs, rubs, clicks, or gallops, distal pulses intact, no carotid bruits  Abdomen: soft, non-tender, non-distended, normal bowel sounds, no masses or organomegaly  Extremities: no cyanosis, clubbing or edema  Musculoskeletal: normal range of motion, no joint swelling, deformity or tenderness  Neurologic: reflexes normal and symmetric, no cranial nerve deficit, gait, coordination and speech normal    Patient's complete Health Risk Assessment and screening values have been reviewed and are found in Flowsheets. The following problems were reviewed today and where indicated follow up appointments were made and/or referrals ordered.     Positive Risk Factor Screenings with Interventions:            General Health and ACP:  General  In general, how would you say your health is?: Good  In the past 7 days, have you experienced any of the following?  New or Increased Pain, New or Increased Fatigue, Loneliness, Social Isolation, Stress or Anger?: None of These  Do you get the social and emotional support that you need?: Yes  Do you have a Living Will?: Yes  Advance Directives     Power of [address] Will ACP-Advance Directive ACP-Power of     Not on File Not on File Not on File Not on File      General Health Risk Interventions:  · none    Health Habits/Nutrition:  Health Habits/Nutrition  Do you exercise for at least 20 minutes 2-3 times per week?: (!) No  Have you lost any weight without trying in the past 3 months?: No  Do you eat only one meal per day?: No  Have you seen the dentist within the past year?: Yes  Body mass index: (!) 38.82  Health Habits/Nutrition Interventions:  · Inadequate physical activity:  patient agrees to exercise for at least 150 minutes/week       Personalized Preventive Plan   Current Health Maintenance Status  Immunization History   Administered Date(s) Administered    Influenza, Quadv, IM, PF (6 mo and older Fluzone, Flulaval, Fluarix, and 3 yrs and older Afluria) 10/30/2020    Pneumococcal Polysaccharide ([id number]) 10/30/2020    Tdap (Boostrix, Adacel) 10/30/2013        Health Maintenance   Topic Date Due    Hepatitis C screen  Never done    Diabetic foot exam  Never done    Diabetic retinal exam  Never done    COVID-19 Vaccine (1) Never done    HIV screen  Never done    Cervical cancer screen  Never done    Shingles Vaccine (1 of 2) Never done    Colon cancer screen colonoscopy  Never done    DEXA (modify frequency per FRAX score)  Never done    Low dose CT lung screening  Never done    Breast cancer screen  06/20/2016    Annual Wellness Visit (AWV)  Never done    A1C test (Diabetic or Prediabetic)  06/28/2022    Diabetic microalbuminuria test  06/28/2022    Lipid Cardiology    RBBB  -     [facility]    Other elevated white blood cell (WBC) count  -     Fredi Aldridge MD, Medical Oncology, [facility]    Age-related osteoporosis without current pathological fracture  -     Vitamin D 25 Hydroxy; Future                 appt  belinda      Hem    Cardio  Sleep study             Dec metofmrin one  Bid      Diet exer  A great deal of time spent reviewing medications, diet, exercise, social issues. Also reviewing the chart before entering the room with patient and finishing charting after leaving patient's room. More than half of that time was spent face to face with the patient in counseling and coordinating care. Check blood pressure at home twice a day. Low-salt low caffeine diet. Call if systolic blood pressure is above 854 and diastolic blood pressures above 85. Only use a upper arm digital cuff. Check blood sugars 4 times a day. Aggressive low, sugar low carbohydrate diet. Call if blood sugar less than 70 or over 200. Avoid eating in between meals. Lose weight. Exercise. Check blood pressure at home twice a day. Low-salt low caffeine diet. Call if systolic blood pressure is above 798 and diastolic blood pressures above 85. Only use a upper arm digital cuff. A great deal of time spent reviewing medications, diet, exercise, social issues. Also reviewing the chart before entering the room with patient and finishing charting after leaving patient's room. More than half of that time was spent face to face with the patient in counseling and coordinating care.

## 2021-06-29 NOTE — TELEPHONE ENCOUNTER
[facility] is calling for 2 things 1. They received a script for the pt for a Dexcom G6 sensor and transmitter but that is not something that they carry there.  2. They will need a new script for the pt's metformin 500 mg because the one they received has 2 instructions Take 1 tablet by mouth 2 times daily (with meals) Take 2 tablets 2 times a day so they need it to be corrected and resent

## 2021-06-29 NOTE — PATIENT INSTRUCTIONS
Personalized Preventive Plan for Concepción Greenberg - 6/29/2021  Medicare offers a range of preventive health benefits. Some of the tests and screenings are paid in full while other may be subject to a deductible, co-insurance, and/or copay. Some of these benefits include a comprehensive review of your medical history including lifestyle, illnesses that may run in your family, and various assessments and screenings as appropriate. After reviewing your medical record and screening and assessments performed today your provider may have ordered immunizations, labs, imaging, and/or referrals for you. A list of these orders (if applicable) as well as your Preventive Care list are included within your After Visit Summary for your review. Other Preventive Recommendations:    · A preventive eye exam performed by an eye specialist is recommended every 1-2 years to screen for glaucoma; cataracts, macular degeneration, and other eye disorders. · A preventive dental visit is recommended every 6 months. · Try to get at least 150 minutes of exercise per week or 10,000 steps per day on a pedometer . · Order or download the FREE \"Exercise & Physical Activity: Your Everyday Guide\" from [employer]. Call 3-399.666.5322 or search [employer] online. · You need 9866-6230 mg of calcium and 8234-3871 IU of vitamin D per day. It is possible to meet your calcium requirement with diet alone, but a vitamin D supplement is usually necessary to meet this goal.  · When exposed to the sun, use a sunscreen that protects against both UVA and UVB radiation with an SPF of 30 or greater. Reapply every 2 to 3 hours or after sweating, drying off with a towel, or swimming. · Always wear a seat belt when traveling in a car. Always wear a helmet when riding a bicycle or motorcycle.

## 2021-06-30 ENCOUNTER — TELEPHONE (OUTPATIENT)
Dept: PRIMARY CARE CLINIC | Age: 66
End: 2021-06-30

## 2021-06-30 ENCOUNTER — TELEPHONE (OUTPATIENT)
Dept: ADMINISTRATIVE | Age: 66
End: 2021-06-30

## 2021-06-30 NOTE — TELEPHONE ENCOUNTER
[facility] called to confirm reason for referral.  Per office note patient SOB with exertion and last ekg showed rbbb

## 2021-06-30 NOTE — TELEPHONE ENCOUNTER
Cardiology referral placed by Dr. Dilan Adair for Palpitations, RBBB. Pt is established pt of Dr. Nithin Franklin. Please advise regarding scheduling.

## 2021-07-01 NOTE — TELEPHONE ENCOUNTER
Med sent-----tell patient she will have the go to a different drugstore for the [facility]

## 2021-07-01 NOTE — TELEPHONE ENCOUNTER
Left message for patient to contact office.     Patient to be scheduled with Dr. Steph Borrego on 7/29/2021 at 2:15 (slot blocked)

## 2021-07-06 ENCOUNTER — TELEPHONE (OUTPATIENT)
Dept: PRIMARY CARE CLINIC | Age: 66
End: 2021-07-06

## 2021-07-06 DIAGNOSIS — E11.9 TYPE 2 DIABETES MELLITUS WITHOUT COMPLICATION, WITH LONG-TERM CURRENT USE OF INSULIN (HCC): Primary | ICD-10-CM

## 2021-07-06 DIAGNOSIS — Z79.4 TYPE 2 DIABETES MELLITUS WITHOUT COMPLICATION, WITH LONG-TERM CURRENT USE OF INSULIN (HCC): Primary | ICD-10-CM

## 2021-07-06 RX ORDER — INSULIN ASPART 100 [IU]/ML
INJECTION, SOLUTION INTRAVENOUS; SUBCUTANEOUS
Qty: 10 PEN | Refills: 11 | Status: SHIPPED
Start: 2021-07-06 | End: 2022-08-12 | Stop reason: SDUPTHER

## 2021-07-06 RX ORDER — INSULIN GLARGINE 100 [IU]/ML
25 INJECTION, SOLUTION SUBCUTANEOUS NIGHTLY
Qty: 5 PEN | Refills: 12 | Status: SHIPPED
Start: 2021-07-06 | End: 2022-08-28 | Stop reason: SDUPTHER

## 2021-07-07 RX ORDER — FLASH GLUCOSE SENSOR
110 KIT MISCELLANEOUS
Qty: 100 EACH | Refills: 12 | Status: SHIPPED
Start: 2021-07-07 | End: 2021-07-29

## 2021-07-07 RX ORDER — FLASH GLUCOSE SCANNING READER
10 EACH MISCELLANEOUS
Qty: 10 DEVICE | Refills: 5 | Status: SHIPPED
Start: 2021-07-07 | End: 2021-07-29

## 2021-07-19 DIAGNOSIS — M19.011 PRIMARY OSTEOARTHRITIS OF RIGHT SHOULDER: ICD-10-CM

## 2021-07-19 RX ORDER — CELECOXIB 200 MG/1
200 CAPSULE ORAL DAILY
Qty: 90 CAPSULE | Refills: 1 | Status: SHIPPED
Start: 2021-07-19 | End: 2022-01-21 | Stop reason: SDUPTHER

## 2021-07-27 ENCOUNTER — HOSPITAL ENCOUNTER (OUTPATIENT)
Dept: INFUSION THERAPY | Age: 66
Discharge: HOME | End: 2021-07-27
Payer: MEDICARE

## 2021-07-27 ENCOUNTER — OFFICE VISIT (OUTPATIENT)
Dept: ONCOLOGY | Age: 66
End: 2021-07-27
Payer: MEDICARE

## 2021-07-27 VITALS
RESPIRATION RATE: 16 BRPM | TEMPERATURE: 98 F | SYSTOLIC BLOOD PRESSURE: 144 MMHG | DIASTOLIC BLOOD PRESSURE: 67 MMHG | WEIGHT: 222.7 LBS | HEIGHT: 59 IN | HEART RATE: 98 BPM | OXYGEN SATURATION: 94 % | BODY MASS INDEX: 44.89 KG/M2

## 2021-07-27 DIAGNOSIS — D72.829 LEUKOCYTOSIS, UNSPECIFIED TYPE: ICD-10-CM

## 2021-07-27 DIAGNOSIS — D72.829 LEUKOCYTOSIS, UNSPECIFIED TYPE: Primary | ICD-10-CM

## 2021-07-27 LAB
BASOPHILS ABSOLUTE: 0.08 E9/L (ref 0–0.2)
BASOPHILS RELATIVE PERCENT: 0.7 % (ref 0–2)
EOSINOPHILS ABSOLUTE: 0.22 E9/L (ref 0.05–0.5)
EOSINOPHILS RELATIVE PERCENT: 1.8 % (ref 0–6)
HCT VFR BLD CALC: 38 % (ref 34–48)
HEMOGLOBIN: 11.8 G/DL (ref 11.5–15.5)
IMMATURE GRANULOCYTES #: 0.13 E9/L
IMMATURE GRANULOCYTES %: 1.1 % (ref 0–5)
LYMPHOCYTES ABSOLUTE: 1.65 E9/L (ref 1.5–4)
LYMPHOCYTES RELATIVE PERCENT: 13.8 % (ref 20–42)
MCH RBC QN AUTO: 25.3 PG (ref 26–35)
MCHC RBC AUTO-ENTMCNC: 31.1 % (ref 32–34.5)
MCV RBC AUTO: 81.4 FL (ref 80–99.9)
MONOCYTES ABSOLUTE: 0.66 E9/L (ref 0.1–0.95)
MONOCYTES RELATIVE PERCENT: 5.5 % (ref 2–12)
NEUTROPHILS ABSOLUTE: 9.25 E9/L (ref 1.8–7.3)
NEUTROPHILS RELATIVE PERCENT: 77.1 % (ref 43–80)
PDW BLD-RTO: 14.4 FL (ref 11.5–15)
PLATELET # BLD: 300 E9/L (ref 130–450)
PMV BLD AUTO: 11.6 FL (ref 7–12)
RBC # BLD: 4.67 E12/L (ref 3.5–5.5)
WBC # BLD: 12 E9/L (ref 4.5–11.5)

## 2021-07-27 PROCEDURE — 99214 OFFICE O/P EST MOD 30 MIN: CPT | Performed by: INTERNAL MEDICINE

## 2021-07-27 PROCEDURE — 36415 COLL VENOUS BLD VENIPUNCTURE: CPT

## 2021-07-27 PROCEDURE — 99205 OFFICE O/P NEW HI 60 MIN: CPT | Performed by: INTERNAL MEDICINE

## 2021-07-27 NOTE — PROGRESS NOTES
[address] [facility]  [address] [place] [id number]  Dept: [phone]  Attending Consult Note      Reason for Visit:   Leukocytosis. Referring Physician:  Sirisha Luciano DO    PCP:  Sirisha Luciano DO    History of Present Illness:      Mrs. Nohemi Giron is a very pleasant 78-year-old lady, with a past medical history significant for tobacco use disorder, COPD, asthma, anxiety and depression, osteoarthritis, type II DM, hypertension, hypothyroidism, and sleep apnea, who was referred to the hematology office for evaluation of leukocytosis and neutrophilia. The patient's CBCD from 6/28/2021 was remarkable for a white count of 14.6, ANC 1030, hemoglobin 12.2, hematocrit 40.1, platelet count 607O. From 8/29/2019, white count was 20.5, ANC 1690, normal hemoglobin hematocrit and platelet count. The patient denies any recurrent infections, fever, unexplained weight loss or night sweats. Review of Systems;  CONSTITUTIONAL: No fever, chills. Good appetite, feeling tired. ENMT: Eyes: No diplopia; Nose: No epistaxis. Mouth: No sore throat. RESPIRATORY: No hemoptysis, positive for shortness of breath, cough. She is scheduled to see pulmonary. CARDIOVASCULAR: No chest pain, palpitations. GASTROINTESTINAL: No nausea/vomiting, abdominal pain, diarrhea/constipation. GENITOURINARY: No dysuria, urinary frequency, hematuria. NEURO: No syncope, presyncope, headache.   Remainder:  ROS NEGATIVE    Past Medical History:      Diagnosis Date    Anxiety     Arthritis     Asthma     COPD (chronic obstructive pulmonary disease) ([address]     uses O2 NC 2 L continuous     Depression     Diabetes mellitus (HCC)     Edema leg     GERD (gastroesophageal reflux disease)     Hypertension     Hypothyroidism     Leukocytosis     Obesity     Osteoarthritis     Sleep apnea     no CPAP     Thyroid disease     Type 2 diabetes mellitus without complication ([address]      Patient Active Problem List   Diagnosis    Essential hypertension    [facility]    Type 2 diabetes mellitus without complication, with long-term current use of insulin ([facility]    Primary osteoarthritis of right shoulder    S/P reverse total shoulder arthroplasty, right    Dental abscess    Severe obesity (BMI 35.0-35.9 with comorbidity) ([facility]    Sleep apnea, obstructive    Palpitations    RBBB        Past Surgical History:      Procedure Laterality Date    CARPAL TUNNEL RELEASE Left 2019    LEFT CARPAL TUNNEL RELEASE performed by Jitendra Sparks MD at [place] [address]    DILATION AND CURETTAGE OF UTERUS      HERNIA REPAIR      ROTATOR CUFF REPAIR Right     SHOULDER SURGERY Right 2020    RIGHT SHOULDER TOTAL ARTHROPLASTY REVERSE WITH ISB performed [facility]    TONSILLECTOMY AND ADENOIDECTOMY      TOTAL KNEE ARTHROPLASTY Left     TUBAL LIGATION         Family History:  Family History   Problem Relation Age of Onset    Stroke Mother     Diabetes Type 1  Mother     Heart Attack Father 79    Alcohol Abuse Father     Breast Cancer Sister     Cancer Sister     No Known Problems Brother     High Blood Pressure Sister        Medications:  Reviewed and reconciled.     Social History:  Social History     Socioeconomic History    Marital status:      Spouse name: Not on file    Number of children: Not on file    Years of education: Not on file    Highest education level: Not on file   Occupational History    Not on file   Tobacco Use    Smoking status: Former Smoker     Packs/day: 1.00     Years: 30.00     Pack years: 30.00     Quit date: 2017     Years since quittin.2    Smokeless tobacco: Never Used   Vaping Use    Vaping Use: Former    Start date: 2015   Misty Sanchez Quit date: 8/10/2015    Substances: Nicotine    Devices: Refillable tank   Substance and Sexual Activity    Alcohol use: Yes     Comment: rare Physically Abused:     Sexually Abused: Allergies: Allergies   Allergen Reactions    Macrobid [Nitrofurantoin Macrocrystal] Other (See Comments)     Exacerbates asthma       Physical Exam:  BP (!) 144/67 (Site: Left Lower Arm, Position: Sitting, Cuff Size: Medium Adult)   Pulse 98   Temp 98 °F (36.7 °C)   Resp 16   Ht 4' 11\" (1.499 m)   Wt 222 lb 11.2 oz (101 kg)   SpO2 94%   BMI 44.98 kg/m²   GENERAL: Alert, oriented x 3, not in acute distress. HEENT: PERRLA; EOMI. Oropharynx clear. NECK: Supple. No palpable cervical or supraclavicular lymphadenopathy. LUNGS: Decreased air entry bilaterally  CARDIOVASCULAR: Regular rate. No murmurs, rubs or gallops. ABDOMEN: Soft. Non-tender, non-distended. Positive bowel sounds. EXTREMITIES: Without clubbing, cyanosis, or edema. NEUROLOGIC: No focal deficits. LYMPHATICS: No palpable peripheral adenopathy. ECOG PS 1    Impression/Plan:       Mrs. Marcy White is a very pleasant 29-year-old lady, with a past medical history significant for tobacco use disorder, COPD, asthma, anxiety and depression, osteoarthritis, type II DM, hypertension, hypothyroidism, and sleep apnea, who was referred to the hematology office for evaluation of leukocytosis and neutrophilia. The patient's CBCD from 6/28/2021 was remarkable for a white count of 14.6, ANC 1030, hemoglobin 12.2, hematocrit 40.1, platelet count 629V. From 8/29/2019, white count was 20.5, ANC 1690, normal hemoglobin hematocrit and platelet count. The patient denies any recurrent infections, fever, unexplained weight loss or night sweats. The patient has leukocytosis and neutrophilia, no erythrocytosis or thrombocytosis, most likely the hematological abnormalities are reactive, could be secondary to stress, the steroids inhalers/nebs, the patient had a chest x-ray done on 6/29/2021, was unremarkable.   We will order a work-up to rule out a primary bone marrow disorder, such as MPN, the patient will have a

## 2021-07-27 NOTE — PROGRESS NOTES
Jase Flair Page  1955 73 y.o. Referring Physician:     PCP: Derek Goldstein DO    Vitals:    21 1134   BP: (!) 144/67   Pulse: 98   Resp: 16   Temp: 98 °F (36.7 °C)   SpO2: 94%        Wt Readings from Last 3 Encounters:   21 222 lb 11.2 oz (101 kg)   21 226 lb 3.2 oz (102.6 kg)   21 223 lb (101.2 kg)        Body mass index is 44.98 kg/m². Chief Complaint:   Chief Complaint   Patient presents with    New Patient         Cancer Staging  No matching staging information was found for the patient. Prior Radiation Therapy? NO    Concurrent Chemo/radiation? NO    Prior Chemotherapy? NO    Prior Hormonal Therapy? NO    Head and Neck Cancer? No, patient does NOT have HN cancer. LMP:     Age at first Menses: 5    : 3    Para: 3          Current Outpatient Medications:     celecoxib (CELEBREX) 200 MG capsule, Take 1 capsule by mouth daily, Disp: 90 capsule, Rfl: 1    blood glucose test strips (ASCENSIA AUTODISC VI;ONE TOUCH ULTRA TEST VI) strip, 1 each by In Vitro route 4 times daily As needed. , Disp: 400 each, Rfl: 12    insulin aspart (NOVOLOG FLEXPEN) 100 UNIT/ML injection pen, Inject insulin 3 times daily before meals according to sliding scale with a max dose of 20 units, Disp: 10 pen, Rfl: 11    insulin glargine (BASAGLAR KWIKPEN) 100 UNIT/ML injection pen, Inject 25 Units into the skin nightly, Disp: 5 pen, Rfl: 12    metFORMIN (GLUCOPHAGE) 500 MG tablet, Take 1 tablet by mouth 2 times daily (with meals), Disp: 180 tablet, Rfl: 5    Lancets MISC, 1 each by Does not apply route 4 times daily, Disp: 400 each, Rfl: 5    Insulin Pen Needle (PEN NEEDLES \") 30G X 8 MM MISC, 1 each by Does not apply route daily Insulin 4 times a day.   May change to what ever type of needle is available, Disp: 100 each, Rfl: 3    Blood Glucose Monitoring Suppl (BLOOD GLUCOSE MONITOR SYSTEM) w/Device KIT, One Touch Ultra monitor, Disp: 1 kit, Rfl: 0    blood glucose monitor strips, Test 4 times a day & as needed for symptoms of irregular blood glucose., Disp: 400 strip, Rfl: 0    fluticasone-umeclidin-vilant (TRELEGY ELLIPTA) 100-62.5-25 MCG/INH AEPB, Inhale 1 puff into the lungs daily, Disp: , Rfl:     citalopram (CELEXA) 40 MG tablet, Take 1 tablet by mouth daily Instructed to take am of procedure, Disp: 90 tablet, Rfl: 5    fluticasone (FLONASE) 50 MCG/ACT nasal spray, 1 spray by Each Nostril route daily, Disp: 1 Bottle, Rfl: 12    levothyroxine (SYNTHROID) 50 MCG tablet, Take 1 tablet by mouth Daily, Disp: 90 tablet, Rfl: 5    hydroCHLOROthiazide (MICROZIDE) 12.5 MG capsule, Take 1 capsule by mouth daily, Disp: 90 capsule, Rfl: 5    glimepiride (AMARYL) 1 MG tablet, Take 1 tablet by mouth 2 times daily (before meals), Disp: 180 tablet, Rfl: 12    albuterol sulfate HFA (PROAIR HFA) 108 (90 Base) MCG/ACT inhaler, INHALE TWO PUFFS BY MOUTH FOUR TIMES A DAY AS NEEDED, Disp: 25.5 g, Rfl: 12    gabapentin (NEURONTIN) 100 MG capsule, Take 1 capsule by mouth nightly for 360 days. , Disp: 90 capsule, Rfl: 3    TURMERIC PO, Take by mouth 2 times daily LD 2-14-20, Disp: , Rfl:     ipratropium-albuterol (DUONEB) 0.5-2.5 (3) MG/3ML SOLN nebulizer solution, Inhale 1 vial into the lungs every 4 hours as needed for Shortness of Breath Instructed to take am of procedure if needed, Disp: , Rfl:     Cholecalciferol (VITAMIN D3) 2000 units CAPS, Take 5,000 Units by mouth daily Ld 2-14-20, Disp: , Rfl:     Coenzyme Q10 (COQ-10) 10 MG CAPS, Take by mouth daily Ld 2-14-20, Disp: , Rfl:     Continuous Blood Gluc Sensor (FREESTYLE JOSÉ MIGUEL 14 DAY SENSOR) MISC, 110 Units by Does not apply route every 2 hours (Patient not taking: Reported on 7/27/2021), Disp: 100 each, Rfl: 12    Continuous Blood Gluc  (FREESTYLE JOSÉ MIGUEL 14 DAY READER) MYRA, 10 Units by Does not apply route every 2 hours (Patient not taking: Reported on 7/27/2021), Disp: 10 Device, Rfl: 5    Continuous Blood Gluc History    Marital status:      Spouse name: Not on file    Number of children: Not on file    Years of education: Not on file    Highest education level: Not on file   Occupational History    Not on file   Tobacco Use    Smoking status: Former Smoker     Packs/day: 1.00     Years: 30.00     Pack years: 30.00     Quit date: 2017     Years since quittin.2    Smokeless tobacco: Never Used   Vaping Use    Vaping Use: Never used   Substance and Sexual Activity    Alcohol use: Yes     Comment: rare    Drug use: Never    Sexual activity: Not on file   Other Topics Concern    Not on file   Social History Narrative        OBESITY    SMOKER----QUIT ------BACK------ QUIT     FH CAD    LEFT SDIED [address] Cassy Corcoran     OA KNEES    EDEMA LEGS    FH DM    LEUKOCYTOSIS--DR BARGER IN PAST--THEN DR BOWMAN     DEPRESSION    NIDDM    HTN    ELEV CRP    EARLY HYPOTHYROID    ASTHMA    SLEEP APNEA------C-PAP------pt quit using    D AND C ----DR PALMA    TORN MENISCUS L KNEE OR  DR DIEHL------SEVERE OA L KNEE ON X RAY     EMG  SEVERE CTS L MOD-SEV R---REFER TO DR STEPHENSON    INJECTIONS KNEE DR GTZ    LEFT TOTAL KNEE OR 2-15 DR GTZ--    R CARPAL TUNNEL SURGEYR 3-15 DR GTZ    VITROUS SEPARATION LEFT EYE 11-15    R SHOULDER OR TORN ROTATOR CUFF  DR SURESH---PT NOT HAPPY    PT MOVED BACK HOME     VENTRAL HERNIA OR DR Arlene Wells     EVAL DR MARROQUIN  COPD ADDED O2--------------------dr Chapin    RETIRED  DISABILITY     Left carpal tunnel   surgery    Right total shoulder arthroplasty   Dr Capo Cat     Social Determinants of Health     Financial Resource Strain:     Difficulty of Paying Living Expenses:    Food Insecurity:     Worried About [address] in the Last Year:     [address] in the Last Year:    Transportation Needs:     Lack of Transportation (Medical):      Lack of Transportation greater:  · For Non-Diabetic Patient: Recommend adding Ensure Enlive 2 x daily and provide patient with Ensure wellness bag with coupons  · For Diabetic Patient: Recommend adding Glucerna Shake 2 x daily and provide patient with Glucerna Wellness bag with coupons  · Route to the dietitian via Rekha Clark RN

## 2021-07-28 LAB — PATHOLOGIST REVIEW: NORMAL

## 2021-08-03 LAB
BCR-ABL1,  T(9;22) SOURCE: NORMAL
BCR/ABL T(9,22): NOT DETECTED

## 2021-08-17 LAB
Lab: NORMAL
REPORT: NORMAL
THIS TEST SENT TO: NORMAL

## 2021-08-20 ENCOUNTER — VIRTUAL VISIT (OUTPATIENT)
Dept: ONCOLOGY | Age: 66
End: 2021-08-20
Payer: MEDICARE

## 2021-08-20 DIAGNOSIS — D72.829 LEUKOCYTOSIS, UNSPECIFIED TYPE: Primary | ICD-10-CM

## 2021-08-20 PROCEDURE — 99213 OFFICE O/P EST LOW 20 MIN: CPT | Performed by: INTERNAL MEDICINE

## 2021-08-20 NOTE — PROGRESS NOTES
[address] MED ONCOLOGY  [address] [facility] [address]  Dept: [phone]  Attending Progress Note      A virtual (Doxy) visit was performed due to the COVID-19 pandemic.  The patient had consented to the encounter.  Patient identification was verified at the start of the visit, including her telephone number and physical location, the patient was at home, I was in the office. Reason for Visit:   Leukocytosis. Referring Physician:  Nicolas Dempsey DO    PCP:  Nicolas Dempsey DO    History of Present Illness:      Mrs. Brenda Calderon is a very pleasant 44-year-old lady, with a past medical history significant for tobacco use disorder, COPD, asthma, anxiety and depression, osteoarthritis, type II DM, hypertension, hypothyroidism, and sleep apnea, who was referred to the hematology office for evaluation of leukocytosis and neutrophilia. The patient's CBCD from 6/28/2021 was remarkable for a white count of 14.6, ANC 1030, hemoglobin 12.2, hematocrit 40.1, platelet count 015A. From 8/29/2019, white count was 20.5, ANC 1690, normal hemoglobin hematocrit and platelet count. The patient denies recurrent infections, but since her last visit she thinks she had RSV infection like several other family members. She was seen by pulmonary, a chest CT scan has been ordered. Review of Systems;  CONSTITUTIONAL: No fever, chills. Good appetite, feeling tired. ENMT: Eyes: No diplopia; Nose: No epistaxis. Mouth: No sore throat. RESPIRATORY: No hemoptysis, positive for shortness of breath, cough. She was seen by pulmonary, she had a chest CT scan ordered. CARDIOVASCULAR: No chest pain, palpitations. GASTROINTESTINAL: No nausea/vomiting, abdominal pain, diarrhea/constipation. GENITOURINARY: No dysuria, urinary frequency, hematuria. NEURO: No syncope, presyncope, headache.   Remainder:  ROS NEGATIVE    Past Medical History:      Diagnosis Date    Anxiety     Arthritis     Asthma     COPD [facility]chronic obstructive pulmonary disease) [facility]     uses O2 NC 2 L continuous     Depression     Diabetes mellitus ([address]     Edema leg     GERD (gastroesophageal reflux disease)     Hypertension     Hypothyroidism     Leukocytosis     Obesity     Osteoarthritis     Sleep apnea     no CPAP     Thyroid disease     Type 2 diabetes mellitus without complication ([facility]      Patient Active Problem List   Diagnosis    Essential hypertension    COPD without exacerbation ([facility]    Type 2 diabetes mellitus without complication, with long-term current use of insulin ([facility]    Primary osteoarthritis of right shoulder    S/P reverse total shoulder arthroplasty, right    Dental abscess    Severe obesity (BMI 35.0-35.9 with comorbidity) ([facility]    Sleep apnea, obstructive    Palpitations    RBBB        Past Surgical History:      Procedure Laterality Date    CARPAL TUNNEL RELEASE Left 7/1/2019    LEFT CARPAL TUNNEL RELEASE performed by Misty Bartlett MD at [place] [address]    DILATION AND CURETTAGE OF UTERUS      HERNIA REPAIR      ROTATOR CUFF REPAIR Right     SHOULDER SURGERY Right 2/21/2020    RIGHT SHOULDER TOTAL ARTHROPLASTY REVERSE WITH ISB performed [facility] OR    TONSILLECTOMY AND ADENOIDECTOMY      TOTAL KNEE ARTHROPLASTY Left     TUBAL LIGATION         Family History:  Family History   Problem Relation Age of Onset    Stroke Mother     Diabetes Type 1  Mother     Heart Attack Father 79    Alcohol Abuse Father     Breast Cancer Sister     Cancer Sister     No Known Problems Brother     High Blood Pressure Sister        Medications:  Reviewed and reconciled.     Social History:  Social History     Socioeconomic History    Marital status:      Spouse name: Not on file    Number of children: Not on file    Years of education: Not on file    Highest education level: Not on file   Occupational History    Not on file   Tobacco Use    Smoking status: Former Smoker     Packs/day: 1.00     Years: 30.00     Pack years: 30.00     Quit date: 2017     Years since quittin.3    Smokeless tobacco: Never Used   Vaping Use    Vaping Use: Former    Start date: 2015   [facility] Quit date: 8/10/2015    Substances: Nicotine    Devices: Refillable tank   Substance and Sexual Activity    Alcohol use: Yes     Comment: rare    Drug use: Never    Sexual activity: Not Currently   Other Topics Concern    Not on file   Social History Narrative        OBESITY    SMOKER----QUIT ------BACK------ QUIT     FH CAD    LEFT SDIED INCISIONAL HERNIA REPAIR DR Huong [place]     OA KNEES    EDEMA LEGS    FH DM    LEUKOCYTOSIS--DR BARGER IN PAST--THEN DR BOWMAN     DEPRESSION    NIDDM    HTN    ELEV CRP    EARLY HYPOTHYROID    ASTHMA    SLEEP APNEA------C-PAP------pt quit using    D AND C ----DR PALMA    TORN MENISCUS L KNEE OR  DR DIEHL------SEVERE OA L KNEE ON X RAY     EMG  SEVERE CTS L MOD-SEV R---REFER TO DR STEPHENSON    INJECTIONS KNEE DR GTZ    LEFT TOTAL KNEE OR 2-15 DR GTZ--    R CARPAL TUNNEL SURGEYR 3-15 DR GTZ    VITROUS SEPARATION LEFT EYE 11-15    R SHOULDER OR TORN ROTATOR CUFF  DR SURESH---PT NOT HAPPY    PT MOVED BACK HOME     VENTRAL HERNIA OR DR Carmella Mckeon     EVAL DR MARROQUIN  COPD ADDED O2--------------------dr Chapin    RETIRED  DISABILITY     Left carpal tunnel   surgery    Right total shoulder arthroplasty   Dr Estelita Collet     Social Determinants of Health     Financial Resource Strain:     Difficulty of Paying Living Expenses:    Food Insecurity:     Worried About Running Out of Food in the Last Year:     Ran Out of Food in the Last Year:    Transportation Needs:     Lack of Transportation (Medical):      Lack of Transportation (Non-Medical):    Physical Activity:     Days of Exercise per Week:     Minutes of Exercise per Session:    Stress: decreased to 12, ANC was 9250, most likely the leukocytosis of benign etiology. At this time there is no indication for bone marrow biopsy and aspirate, will continue monitor her CBCD. Follow-up in 3 months. Thank you for allowing us to participate in the care of Mrs. Amador.     Kristy Murray MD   HEMATOLOGY/MEDICAL ONCOLOGY  [address] [place] [address] ONCOLOGY  [address] [facility] [address]  [facility] [address]  Dept: [phone]

## 2021-08-23 DIAGNOSIS — Z79.4 TYPE 2 DIABETES MELLITUS WITHOUT COMPLICATION, WITH LONG-TERM CURRENT USE OF INSULIN (HCC): ICD-10-CM

## 2021-08-23 DIAGNOSIS — E11.9 TYPE 2 DIABETES MELLITUS WITHOUT COMPLICATION, WITH LONG-TERM CURRENT USE OF INSULIN (HCC): ICD-10-CM

## 2021-08-23 RX ORDER — PEN NEEDLE, DIABETIC 30 GX5/16"
1 NEEDLE, DISPOSABLE MISCELLANEOUS DAILY
Qty: 100 EACH | Refills: 3 | Status: SHIPPED
Start: 2021-08-23 | End: 2022-08-12 | Stop reason: SDUPTHER

## 2021-09-01 ENCOUNTER — HOSPITAL ENCOUNTER (OUTPATIENT)
Dept: CT IMAGING | Age: 66
Discharge: HOME | End: 2021-09-03
Payer: MEDICARE

## 2021-09-01 DIAGNOSIS — R06.02 SHORTNESS OF BREATH: ICD-10-CM

## 2021-09-01 PROCEDURE — 71250 CT THORAX DX C-: CPT

## 2021-09-02 RX ORDER — IPRATROPIUM BROMIDE AND ALBUTEROL SULFATE 2.5; .5 MG/3ML; MG/3ML
1 SOLUTION RESPIRATORY (INHALATION) EVERY 4 HOURS
COMMUNITY
End: 2021-09-02 | Stop reason: SDUPTHER

## 2021-09-02 RX ORDER — IPRATROPIUM BROMIDE AND ALBUTEROL SULFATE 2.5; .5 MG/3ML; MG/3ML
1 SOLUTION RESPIRATORY (INHALATION) EVERY 4 HOURS
Qty: 360 ML | Refills: 5 | Status: SHIPPED
Start: 2021-09-02 | End: 2022-05-04

## 2021-09-15 ENCOUNTER — OFFICE VISIT (OUTPATIENT)
Dept: CARDIOLOGY CLINIC | Age: 66
End: 2021-09-15
Payer: MEDICARE

## 2021-09-15 VITALS
SYSTOLIC BLOOD PRESSURE: 122 MMHG | DIASTOLIC BLOOD PRESSURE: 82 MMHG | BODY MASS INDEX: 46.28 KG/M2 | HEIGHT: 59 IN | RESPIRATION RATE: 16 BRPM | OXYGEN SATURATION: 98 % | HEART RATE: 101 BPM | WEIGHT: 229.6 LBS

## 2021-09-15 DIAGNOSIS — R06.09 DOE (DYSPNEA ON EXERTION): ICD-10-CM

## 2021-09-15 DIAGNOSIS — I45.10 RBBB: Primary | ICD-10-CM

## 2021-09-15 PROCEDURE — 93000 ELECTROCARDIOGRAM COMPLETE: CPT | Performed by: INTERNAL MEDICINE

## 2021-09-15 PROCEDURE — 99214 OFFICE O/P EST MOD 30 MIN: CPT | Performed by: INTERNAL MEDICINE

## 2021-09-15 RX ORDER — ASPIRIN 81 MG/1
81 TABLET ORAL DAILY
COMMUNITY

## 2021-09-15 NOTE — PROGRESS NOTES
OUTPATIENT CARDIOLOGY FOLLOW-UP    Name: Taras Contreras    Age: 73 y.o. Primary Care Physician: Linda Pizano DO    Date of Service: 9/15/2021    Chief Complaint:   Chief Complaint   Patient presents with    Heart Problem     RBBB, SOB, racing heart, swelling in ankles,         Interim History:   Here for reevaluation at the request of Dr. Dawson Barron regarding right bundle branch block and palpitations. She has morbid obesity, untreated sleep apnea, COPD on chronic oxygen, hypertension. States he gets racing of the heart when she walks along with shortness of breath, but resolves when she rests. Also notices his oxygen level dropped to the 80s with ambulation. Denies exertional chest pain. Gets occasional nonexertional chest discomfort relieved with belching. She does have GERD. Minimal lower extremity edema. Currently using 2 L nasal cannula.     Review of Systems:   Negative except as described above    Past Medical History:  Past Medical History:   Diagnosis Date    Anxiety     Arthritis     Asthma     COPD (chronic obstructive pulmonary disease) ([address]     uses O2 NC 2 L continuous     Depression     Diabetes mellitus ([address]     Edema leg     GERD (gastroesophageal reflux disease)     Hypertension     Hypothyroidism     Leukocytosis     Obesity     Osteoarthritis     Sleep apnea     no CPAP     Thyroid disease     Type 2 diabetes mellitus without complication ([address]        Past Surgical History:  Past Surgical History:   Procedure Laterality Date    CARPAL TUNNEL RELEASE Left 7/1/2019    LEFT CARPAL TUNNEL RELEASE performed [facility] at [facility]    DILATION AND CURETTAGE OF UTERUS      HERNIA REPAIR      ROTATOR CUFF REPAIR Right     SHOULDER SURGERY Right 2/21/2020    RIGHT SHOULDER TOTAL ARTHROPLASTY REVERSE WITH ISB performed by Tyra Gardiner MD at [address] [place] [address] ARTHROPLASTY Left     TUBAL LIGATION         Family History:  Family History   Problem Relation Age of Onset   Chinle Self Stroke Mother     Diabetes Type 1  Mother     Heart Attack Father 79    Alcohol Abuse Father     Breast Cancer Sister     Cancer Sister     No Known Problems Brother     High Blood Pressure Sister        Social History:  Social History     Tobacco Use    Smoking status: Former Smoker     Packs/day: 1.00     Years: 30.00     Pack years: 30.00     Quit date: 2017     Years since quittin.3    Smokeless tobacco: Never Used   Vaping Use    Vaping Use: Former    Start date: 2015   [facility] Self Quit date: 8/10/2015    Substances: Nicotine    Devices: i-Opticsble tank   Substance Use Topics    Alcohol use: Yes     Comment: rare    Drug use: Never        Allergies: Allergies   Allergen Reactions    Macrobid [Nitrofurantoin Macrocrystal] Other (See Comments)     Exacerbates asthma       Current Medications:    Current Outpatient Medications:     aspirin 81 MG EC tablet, Take 81 mg by mouth daily, Disp: , Rfl:     ipratropium-albuterol (DUONEB) 0.5-2.5 (3) MG/3ML SOLN nebulizer solution, Inhale 3 mLs into the lungs every 4 hours, Disp: 360 mL, Rfl: 5    Fluticasone-Umeclidin-Vilant (TRELEGY ELLIPTA) 200-62.5-25 MCG/INH AEPB, Inhale 1 puff into the lungs daily, Disp: 1 each, Rfl: 5    Insulin Pen Needle (PEN NEEDLES 5/16\") 30G X 8 MM MISC, 1 each by Does not apply route daily Insulin 4 times a day. May change to what ever type of needle is available, Disp: 100 each, Rfl: 3    celecoxib (CELEBREX) 200 MG capsule, Take 1 capsule by mouth daily, Disp: 90 capsule, Rfl: 1    blood glucose test strips (ASCENSIA AUTODISC VI;ONE TOUCH ULTRA TEST VI) strip, 1 each by In Vitro route 4 times daily As needed. , Disp: 400 each, Rfl: 12    insulin aspart (NOVOLOG FLEXPEN) 100 UNIT/ML injection pen, Inject insulin 3 times daily before meals according to sliding scale with a max dose of 20 units, Disp: 10 pen, Rfl: 11    insulin glargine (BASAGLAR KWIKPEN) 100 UNIT/ML injection pen, Inject 25 Units into the skin nightly, Disp: 5 pen, Rfl: 12    metFORMIN (GLUCOPHAGE) 500 MG tablet, Take 1 tablet by mouth 2 times daily (with meals), Disp: 180 tablet, Rfl: 5    Lancets MISC, 1 each by Does not apply route 4 times daily, Disp: 400 each, Rfl: 5    Blood Glucose Monitoring Suppl (BLOOD GLUCOSE MONITOR SYSTEM) w/Device KIT, One Touch Ultra monitor, Disp: 1 kit, Rfl: 0    blood glucose monitor strips, Test 4 times a day & as needed for symptoms of irregular blood glucose., Disp: 400 strip, Rfl: 0    citalopram (CELEXA) 40 MG tablet, Take 1 tablet by mouth daily Instructed to take am of procedure, Disp: 90 tablet, Rfl: 5    fluticasone (FLONASE) 50 MCG/ACT nasal spray, 1 spray by Each Nostril route daily, Disp: 1 Bottle, Rfl: 12    levothyroxine (SYNTHROID) 50 MCG tablet, Take 1 tablet by mouth Daily, Disp: 90 tablet, Rfl: 5    hydroCHLOROthiazide (MICROZIDE) 12.5 MG capsule, Take 1 capsule by mouth daily, Disp: 90 capsule, Rfl: 5    glimepiride (AMARYL) 1 MG tablet, Take 1 tablet by mouth 2 times daily (before meals), Disp: 180 tablet, Rfl: 12    albuterol sulfate HFA (PROAIR HFA) 108 (90 Base) MCG/ACT inhaler, INHALE TWO PUFFS BY MOUTH FOUR TIMES A DAY AS NEEDED, Disp: 25.5 g, Rfl: 12    gabapentin (NEURONTIN) 100 MG capsule, Take 1 capsule by mouth nightly for 360 days. , Disp: 90 capsule, Rfl: 3    TURMERIC PO, Take by mouth daily LD 2-14-20, Disp: , Rfl:     ipratropium-albuterol (DUONEB) 0.5-2.5 (3) MG/3ML SOLN nebulizer solution, Inhale 1 vial into the lungs every 4 hours as needed for Shortness of Breath Instructed to take am of procedure if needed, Disp: , Rfl:     Cholecalciferol (VITAMIN D3) 2000 units CAPS, Take 5,000 Units by mouth daily Ld 2-14-20, Disp: , Rfl:     Coenzyme Q10 (COQ-10) 10 MG CAPS, Take by mouth daily Ld 2-14-20, Disp: , Rfl:     aspirin 325 MG EC tablet, Take 1 tablet by mouth 2 times daily, Disp: 84 tablet, Rfl: 0    loratadine (CLARITIN) 10 MG capsule, Take 10 mg by mouth daily (Patient not taking: Reported on 7/27/2021), Disp: , Rfl:     Physical Exam:  /82 (Site: Right Upper Arm, Position: Sitting, Cuff Size: Large Adult)   Pulse 101   Resp 16   Ht 4' 11\" (1.499 m)   Wt 229 lb 9.6 oz (104.1 kg)   SpO2 98%   BMI 46.37 kg/m²   Wt Readings from Last 3 Encounters:   09/15/21 229 lb 9.6 oz (104.1 kg)   07/27/21 229 lb (103.9 kg)   07/27/21 222 lb 11.2 oz (101 kg)     SpO2 initially 86%, then 98% after resting    Appearance: Morbidly obese, awake, alert and oriented x 3, on nasal cannula  Skin: Intact, no rash  Head: Normocephalic, atraumatic  Eyes: EOMI, no conjunctival erythema  ENMT: No pharyngeal erythema, MMM, no rhinorrhea  Neck: Supple, no elevated JVP, no carotid bruits  Lungs: Clear to auscultation bilaterally. No wheezes, rales, or rhonchi.   Cardiac: Regular rate and rhythm, +S1S2, no murmurs apparent  Abdomen: Soft, nontender, +bowel sounds  Extremities: Moves all extremities x 4, trace lower extremity edema  Neurologic: No focal motor deficits apparent, normal mood and affect, alert and oriented x 3  Peripheral Pulses: Intact posterior tibial pulses bilaterally    Laboratory Tests:  Lab Results   Component Value Date    CREATININE 0.9 06/28/2021    BUN 15 06/28/2021     06/28/2021    K 4.4 06/28/2021    CL 94 (L) 06/28/2021    CO2 22 06/28/2021     No results found for: MG  Lab Results   Component Value Date    WBC 12.0 (H) 07/27/2021    HGB 11.8 07/27/2021    HCT 38.0 07/27/2021    MCV 81.4 07/27/2021     07/27/2021     Lab Results   Component Value Date    ALT 12 06/28/2021    AST 15 06/28/2021    ALKPHOS 89 06/28/2021    BILITOT 0.3 06/28/2021     No results found for: CKTOTAL, CKMB, CKMBINDEX, TROPONINI  No results found for: INR, PROTIME  Lab Results   Component Value Date    TSH 3.740 10/28/2020     Lab Results   Component Value Date    LABA1C 6.5 needs higher oxygen supplementation with exertion, needs to follow-up with Dr. Yoana Estrella for further recommendations  · Increase physical activity as able  · Consider pulmonary rehab  · Recommend moderate intensity statin given presence of diabetes  · Aggressive risk factor modification with weight loss recommended  · Follow-up in 6 months or sooner if need arises    The patient's current medication list, allergies, problem list and results of all previously ordered testing were reviewed at today's visit.     Sandie Patterson MD, [facility] Cardiology

## 2021-10-25 NOTE — TELEPHONE ENCOUNTER
Patient called and stated she had Dr. Vi Morgan fill out a form stating she was cleared for surgery about 1 month ago. Patient stated she lost the form and would like to know if Dr. Vi Morgan will type a letter stating she is cleared for surgery. Nothing scanned into patients chart. Patient I scheduled to have right shoulder surgery on 02/21/20 with Dr. Nithya Fajardo. Fax [phone]. Will you write letter clearing patient for surgery? Please advise. show

## 2021-11-01 DIAGNOSIS — M19.011 PRIMARY OSTEOARTHRITIS OF RIGHT SHOULDER: Chronic | ICD-10-CM

## 2021-11-01 RX ORDER — GABAPENTIN 100 MG/1
100 CAPSULE ORAL NIGHTLY
Qty: 90 CAPSULE | Refills: 3 | Status: SHIPPED
Start: 2021-11-01 | End: 2021-11-01 | Stop reason: SDUPTHER

## 2021-11-01 RX ORDER — GABAPENTIN 100 MG/1
100 CAPSULE ORAL NIGHTLY
Qty: 90 CAPSULE | Refills: 3 | Status: SHIPPED
Start: 2021-11-01 | End: 2022-10-24 | Stop reason: SDUPTHER

## 2021-11-06 ENCOUNTER — HOSPITAL ENCOUNTER (OUTPATIENT)
Dept: SLEEP CENTER | Age: 66
Discharge: HOME | End: 2021-11-06
Payer: MEDICARE

## 2021-11-06 PROCEDURE — 95811 POLYSOM 6/>YRS CPAP 4/> PARM: CPT

## 2021-11-06 PROCEDURE — 2700000000 HC OXYGEN THERAPY PER DAY

## 2021-11-07 VITALS — WEIGHT: 220 LBS | OXYGEN SATURATION: 95 % | HEART RATE: 98 BPM | BODY MASS INDEX: 44.35 KG/M2 | HEIGHT: 59 IN

## 2021-11-07 ASSESSMENT — SLEEP AND FATIGUE QUESTIONNAIRES
HOW LIKELY ARE YOU TO NOD OFF OR FALL ASLEEP WHILE WATCHING TV: 3
HOW LIKELY ARE YOU TO NOD OFF OR FALL ASLEEP WHEN YOU ARE A PASSENGER IN A CAR FOR AN HOUR WITHOUT A BREAK: 0
HOW LIKELY ARE YOU TO NOD OFF OR FALL ASLEEP WHILE SITTING AND TALKING TO SOMEONE: 0
HOW LIKELY ARE YOU TO NOD OFF OR FALL ASLEEP WHILE SITTING QUIETLY AFTER LUNCH WITHOUT ALCOHOL: 2
HOW LIKELY ARE YOU TO NOD OFF OR FALL ASLEEP WHILE SITTING INACTIVE IN A PUBLIC PLACE: 0
HOW LIKELY ARE YOU TO NOD OFF OR FALL ASLEEP WHILE LYING DOWN TO REST IN THE AFTERNOON WHEN CIRCUMSTANCES PERMIT: 3
HOW LIKELY ARE YOU TO NOD OFF OR FALL ASLEEP WHILE SITTING AND READING: 2
HOW LIKELY ARE YOU TO NOD OFF OR FALL ASLEEP IN A CAR, WHILE STOPPED FOR A FEW MINUTES IN TRAFFIC: 0
ESS TOTAL SCORE: 10

## 2021-11-16 ENCOUNTER — TELEPHONE (OUTPATIENT)
Dept: INFUSION THERAPY | Age: 66
End: 2021-11-16

## 2021-11-16 NOTE — TELEPHONE ENCOUNTER
This RN spoke with pt this afternoon about need to reschedule appt next Wednesday 11/24 d/t dr Rashard Delgado being out of office.  Pt verbalized understanding that virtual visit has been moved to 12/8 at 3 pm.

## 2021-11-18 DIAGNOSIS — I10 ESSENTIAL HYPERTENSION: Chronic | ICD-10-CM

## 2021-11-18 DIAGNOSIS — E03.9 ACQUIRED HYPOTHYROIDISM: ICD-10-CM

## 2021-11-18 RX ORDER — LEVOTHYROXINE SODIUM 0.05 MG/1
50 TABLET ORAL DAILY
Qty: 90 TABLET | Refills: 5 | Status: SHIPPED
Start: 2021-11-18 | End: 2021-11-18 | Stop reason: SDUPTHER

## 2021-11-18 RX ORDER — HYDROCHLOROTHIAZIDE 12.5 MG/1
12.5 CAPSULE, GELATIN COATED ORAL DAILY
Qty: 90 CAPSULE | Refills: 5 | Status: SHIPPED
Start: 2021-11-18 | End: 2022-10-24 | Stop reason: SDUPTHER

## 2021-11-19 RX ORDER — LEVOTHYROXINE SODIUM 0.05 MG/1
50 TABLET ORAL DAILY
Qty: 90 TABLET | Refills: 3 | Status: SHIPPED | OUTPATIENT
Start: 2021-11-19

## 2021-11-20 NOTE — PROGRESS NOTES
[address] [place] [address]                               SLEEP STUDY REPORT    PATIENT NAME: Hart Paget                        :        1955  MED REC NO:   [id number]                            ROOM:  ACCOUNT NO:   [de-identified]                           ADMIT DATE: 2021  PROVIDER:     Brandi Freire MD    DATE OF PROCEDURE:  2021    SPLIT POLYSOMNOGRAPHY    REFERRING PROVIDER:  Blaine Franco DO    INDICATION FOR TESTING:  The patient with excessive daytime sleepiness,  restless sleep, trouble with memory, leg jerks, crawling sensation in  legs, heartburn, awakened to urinate, trouble staying asleep, and racing  thoughts. PAST MEDICAL HISTORY:  Significant for COPD, hypertension, diabetes,  thyroid disease, arthritis, chronic pain, depression, postmenopausal,  known SAROJ, noncompliance with machine. CURRENT MEDICATIONS:  Include metformin, fluticasone, Trelegy, Celebrex,  Celexa, Flonase, Synthroid, hydrochlorothiazide, Amaryl, albuterol  sulfate, Neurontin, NovoLog, turmeric peel, Biotin, DuoNeb, Claritin,  vitamin 3, co-enzyme Q10, and Paty Ivonne. The patient has a BMI of 38.8, has a neck circumference of 18.75 inches  and Alleman sleepiness scale of 10/24. DIAGNOSTIC PORTION OF THE STUDY:  SLEEP ARCHITECTURE:  Total recording time was 138 minutes. Total sleep  time was 121.5 minutes. Sleep efficacy was 88.1%. Sleep latency was  10.9. SLEEP STAGES:  The patient was awake for 5.6 minutes. Spent 9.1% in N1,  39.3% in N2, 1.6% in N3.    RESPIRATORY EVENTS:  During the study, the patient did not have any  apneas. Had 130 hypopneas. The longest duration of the hypopneic event  was 24.9 seconds. The apnea-hypopnea index was 64.2. AROUSALS DURING THE STUDY:  The patient had 29 arousals, out of which of  those 19 were spontaneous and 10 were associated with respiratory  events.   The arousal index was 14.3. LEG MOVEMENT SUMMARY:  During the study, the patient did not have any  leg movements. OXYGEN SATURATION:  The patient's mean oxygen saturation while awake was  87.4%. The patient's oxygen saturation during steady sleep was 84.2%. The patient spent 121 minutes with oxygen saturations below 88 and the  lowest oxygen saturation was 75%. HEART RATE SUMMARY:  The patient's average heart rate while awake was  99.8 beats per minute. The patient's average heart rate during steady  sleep was 99.7 beats per minute. TREATMENT PORTION OF THE STUDY:  SLEEP ARCHITECTURE:  Total recording time was 220 minutes. Total sleep  time was only 10 minutes. Sleep efficacy was 4.5 minutes. Sleep  latency was 0.4 minutes. The patient was awake for most of this portion of the study which was  210 minutes. Spent only 5.5 minutes in N1 and 4.5 minutes in N2.    DIAGNOSTIC PORTION:  The patient did not sleep at all, therefore there  was not an adequate titration. IMPRESSION:  1. Very severe sleep apnea. 2.  Significant nocturnal oxygen desaturation. RECOMMENDATIONS:  We recommend the patient to go back to the sleep lab  for just a titration study. Thank you very much.         Destinee Tanner MD    D: 11/19/2021 14:56:33       T: 11/19/2021 22:45:17     [id number]  Job#: [id number]     Doc#: [id number]    CC:

## 2021-12-01 ENCOUNTER — TELEPHONE (OUTPATIENT)
Dept: INFUSION THERAPY | Age: 66
End: 2021-12-01

## 2021-12-01 NOTE — TELEPHONE ENCOUNTER
This RN spoke to pt at this time, pt would like to cancel upcoming appt with Dr Kacy Cartagena until she sees her PCP. States will call to reschedule if PCP thinks its necessary.

## 2021-12-02 DIAGNOSIS — E11.9 TYPE 2 DIABETES MELLITUS WITHOUT COMPLICATION, WITH LONG-TERM CURRENT USE OF INSULIN (HCC): ICD-10-CM

## 2021-12-02 DIAGNOSIS — Z79.4 TYPE 2 DIABETES MELLITUS WITHOUT COMPLICATION, WITH LONG-TERM CURRENT USE OF INSULIN (HCC): ICD-10-CM

## 2021-12-02 RX ORDER — GLIMEPIRIDE 1 MG/1
1 TABLET ORAL
Qty: 180 TABLET | Refills: 3 | Status: SHIPPED
Start: 2021-12-02 | End: 2022-06-18

## 2021-12-09 DIAGNOSIS — E11.9 TYPE 2 DIABETES MELLITUS WITHOUT COMPLICATION, WITH LONG-TERM CURRENT USE OF INSULIN (HCC): Chronic | ICD-10-CM

## 2021-12-09 DIAGNOSIS — I10 ESSENTIAL HYPERTENSION: Chronic | ICD-10-CM

## 2021-12-09 DIAGNOSIS — Z79.4 TYPE 2 DIABETES MELLITUS WITHOUT COMPLICATION, WITH LONG-TERM CURRENT USE OF INSULIN (HCC): Chronic | ICD-10-CM

## 2021-12-09 DIAGNOSIS — M81.0 AGE-RELATED OSTEOPOROSIS WITHOUT CURRENT PATHOLOGICAL FRACTURE: ICD-10-CM

## 2021-12-09 LAB
ALBUMIN SERPL-MCNC: 3.6 G/DL (ref 3.5–5.2)
ALP BLD-CCNC: 92 U/L (ref 35–104)
ALT SERPL-CCNC: 11 U/L (ref 0–32)
ANION GAP SERPL CALCULATED.3IONS-SCNC: 19 MMOL/L (ref 7–16)
AST SERPL-CCNC: 15 U/L (ref 0–31)
BASOPHILS ABSOLUTE: 0.11 E9/L (ref 0–0.2)
BASOPHILS RELATIVE PERCENT: 0.8 % (ref 0–2)
BILIRUB SERPL-MCNC: 0.3 MG/DL (ref 0–1.2)
BUN BLDV-MCNC: 16 MG/DL (ref 6–23)
CALCIUM SERPL-MCNC: 9.4 MG/DL (ref 8.6–10.2)
CHLORIDE BLD-SCNC: 98 MMOL/L (ref 98–107)
CHOLESTEROL, TOTAL: 161 MG/DL (ref 0–199)
CO2: 22 MMOL/L (ref 22–29)
CREAT SERPL-MCNC: 0.9 MG/DL (ref 0.5–1)
EOSINOPHILS ABSOLUTE: 0.44 E9/L (ref 0.05–0.5)
EOSINOPHILS RELATIVE PERCENT: 3.2 % (ref 0–6)
GFR AFRICAN AMERICAN: >60
GFR NON-AFRICAN AMERICAN: >60 ML/MIN/1.73
GLUCOSE BLD-MCNC: 154 MG/DL (ref 74–99)
HBA1C MFR BLD: 7 % (ref 4–5.6)
HCT VFR BLD CALC: 39 % (ref 34–48)
HDLC SERPL-MCNC: 43 MG/DL
HEMOGLOBIN: 11.8 G/DL (ref 11.5–15.5)
IMMATURE GRANULOCYTES #: 0.14 E9/L
IMMATURE GRANULOCYTES %: 1 % (ref 0–5)
LDL CHOLESTEROL CALCULATED: 85 MG/DL (ref 0–99)
LYMPHOCYTES ABSOLUTE: 4.02 E9/L (ref 1.5–4)
LYMPHOCYTES RELATIVE PERCENT: 29.2 % (ref 20–42)
MCH RBC QN AUTO: 23.8 PG (ref 26–35)
MCHC RBC AUTO-ENTMCNC: 30.3 % (ref 32–34.5)
MCV RBC AUTO: 78.6 FL (ref 80–99.9)
MICROALBUMIN UR-MCNC: <12 MG/L
MONOCYTES ABSOLUTE: 0.94 E9/L (ref 0.1–0.95)
MONOCYTES RELATIVE PERCENT: 6.8 % (ref 2–12)
NEUTROPHILS ABSOLUTE: 8.14 E9/L (ref 1.8–7.3)
NEUTROPHILS RELATIVE PERCENT: 59 % (ref 43–80)
PDW BLD-RTO: 15.2 FL (ref 11.5–15)
PLATELET # BLD: 371 E9/L (ref 130–450)
PMV BLD AUTO: 12.5 FL (ref 7–12)
POTASSIUM SERPL-SCNC: 4.7 MMOL/L (ref 3.5–5)
RBC # BLD: 4.96 E12/L (ref 3.5–5.5)
SODIUM BLD-SCNC: 139 MMOL/L (ref 132–146)
TOTAL PROTEIN: 7.2 G/DL (ref 6.4–8.3)
TRIGL SERPL-MCNC: 166 MG/DL (ref 0–149)
VITAMIN D 25-HYDROXY: 108 NG/ML (ref 30–100)
VLDLC SERPL CALC-MCNC: 33 MG/DL
WBC # BLD: 13.8 E9/L (ref 4.5–11.5)

## 2021-12-13 ENCOUNTER — OFFICE VISIT (OUTPATIENT)
Dept: PRIMARY CARE CLINIC | Age: 66
End: 2021-12-13
Payer: MEDICARE

## 2021-12-13 VITALS
TEMPERATURE: 96.8 F | SYSTOLIC BLOOD PRESSURE: 135 MMHG | WEIGHT: 234 LBS | OXYGEN SATURATION: 93 % | DIASTOLIC BLOOD PRESSURE: 83 MMHG | BODY MASS INDEX: 47.17 KG/M2 | HEART RATE: 120 BPM | HEIGHT: 59 IN

## 2021-12-13 DIAGNOSIS — J44.9 COPD WITHOUT EXACERBATION (HCC): Chronic | ICD-10-CM

## 2021-12-13 DIAGNOSIS — E03.9 ACQUIRED HYPOTHYROIDISM: ICD-10-CM

## 2021-12-13 DIAGNOSIS — Z23 NEED FOR INFLUENZA VACCINATION: ICD-10-CM

## 2021-12-13 DIAGNOSIS — M81.0 AGE-RELATED OSTEOPOROSIS WITHOUT CURRENT PATHOLOGICAL FRACTURE: ICD-10-CM

## 2021-12-13 DIAGNOSIS — I10 ESSENTIAL HYPERTENSION: Chronic | ICD-10-CM

## 2021-12-13 DIAGNOSIS — E11.9 TYPE 2 DIABETES MELLITUS WITHOUT COMPLICATION, WITH LONG-TERM CURRENT USE OF INSULIN (HCC): Chronic | ICD-10-CM

## 2021-12-13 DIAGNOSIS — E78.2 MIXED HYPERLIPIDEMIA: ICD-10-CM

## 2021-12-13 DIAGNOSIS — Z79.4 TYPE 2 DIABETES MELLITUS WITHOUT COMPLICATION, WITH LONG-TERM CURRENT USE OF INSULIN (HCC): Chronic | ICD-10-CM

## 2021-12-13 DIAGNOSIS — Z00.00 ROUTINE GENERAL MEDICAL EXAMINATION AT A HEALTH CARE FACILITY: Primary | ICD-10-CM

## 2021-12-13 PROCEDURE — G0439 PPPS, SUBSEQ VISIT: HCPCS | Performed by: FAMILY MEDICINE

## 2021-12-13 PROCEDURE — 3051F HG A1C>EQUAL 7.0%<8.0%: CPT | Performed by: FAMILY MEDICINE

## 2021-12-13 PROCEDURE — 90694 VACC AIIV4 NO PRSRV 0.5ML IM: CPT | Performed by: FAMILY MEDICINE

## 2021-12-13 PROCEDURE — G0008 ADMIN INFLUENZA VIRUS VAC: HCPCS | Performed by: FAMILY MEDICINE

## 2021-12-13 ASSESSMENT — PATIENT HEALTH QUESTIONNAIRE - PHQ9
2. FEELING DOWN, DEPRESSED OR HOPELESS: 0
SUM OF ALL RESPONSES TO PHQ QUESTIONS 1-9: 0
1. LITTLE INTEREST OR PLEASURE IN DOING THINGS: 0
SUM OF ALL RESPONSES TO PHQ9 QUESTIONS 1 & 2: 0

## 2021-12-13 ASSESSMENT — LIFESTYLE VARIABLES
AUDIT TOTAL SCORE: INCOMPLETE
HOW OFTEN DO YOU HAVE A DRINK CONTAINING ALCOHOL: NEVER
AUDIT-C TOTAL SCORE: INCOMPLETE
HOW OFTEN DO YOU HAVE A DRINK CONTAINING ALCOHOL: 0

## 2021-12-13 NOTE — PATIENT INSTRUCTIONS
Personalized Preventive Plan for Ngozi Golden - 12/13/2021  Medicare offers a range of preventive health benefits. Some of the tests and screenings are paid in full while other may be subject to a deductible, co-insurance, and/or copay. Some of these benefits include a comprehensive review of your medical history including lifestyle, illnesses that may run in your family, and various assessments and screenings as appropriate. After reviewing your medical record and screening and assessments performed today your provider may have ordered immunizations, labs, imaging, and/or referrals for you. A list of these orders (if applicable) as well as your Preventive Care list are included within your After Visit Summary for your review. Other Preventive Recommendations:    · A preventive eye exam performed by an eye specialist is recommended every 1-2 years to screen for glaucoma; cataracts, macular degeneration, and other eye disorders. · A preventive dental visit is recommended every 6 months. · Try to get at least 150 minutes of exercise per week or 10,000 steps per day on a pedometer . · Order or download the FREE \"Exercise & Physical Activity: Your Everyday Guide\" from [employer]. Call 9-446.985.6152 or search [employer] online. · You need 0172-3103 mg of calcium and 2963-3290 IU of vitamin D per day. It is possible to meet your calcium requirement with diet alone, but a vitamin D supplement is usually necessary to meet this goal.  · When exposed to the sun, use a sunscreen that protects against both UVA and UVB radiation with an SPF of 30 or greater. Reapply every 2 to 3 hours or after sweating, drying off with a towel, or swimming. · Always wear a seat belt when traveling in a car. Always wear a helmet when riding a bicycle or motorcycle.

## 2021-12-13 NOTE — PROGRESS NOTES
Medicare Annual Wellness Visit  Name: Margarita He Date: 2021   MRN: [id number] Sex: Female   Age: 73 y.o. Ethnicity: Non- / Non    : 1955 Race: White (non-)      Ernst Dose Page is here for Medicare AWV     and  6 mock  Re    breating    Copd  slep apnea  Chol thyroid  Diabetes  Weight    diab  rbbb    Saw cdio and for echo and she not hear yet    seiegn dr Albert Inks with     [facility]  Sl       ghb sl inc    o2 set at  3 l  Here   Wt up   Very ianctive       Screenings for behavioral, psychosocial and functional/safety risks, and cognitive dysfunction are all negative except as indicated below. These results, as well as other patient data from the [address] form, are documented in Flowsheets linked to this Encounter. Allergies   Allergen Reactions    Macrobid [Nitrofurantoin Macrocrystal] Other (See Comments)     Exacerbates asthma         Prior to Visit Medications    Medication Sig Taking? Authorizing Provider   glimepiride (AMARYL) 1 MG tablet Take 1 tablet by mouth 2 times daily (before meals)  Adi Sesay DO   hydroCHLOROthiazide (MICROZIDE) 12.5 MG capsule Take 1 capsule by mouth daily  Adi Sesay DO   gabapentin (NEURONTIN) 100 MG capsule Take 1 capsule by mouth nightly for 360 days. Adi Sesay DO   levothyroxine (SYNTHROID) 50 MCG tablet Take 1 tablet by mouth Daily  Adi Sesay DO   aspirin 81 MG EC tablet Take 81 mg by mouth daily  Historical Provider, MD   ipratropium-albuterol (DUONEB) 0.5-2.5 (3) MG/3ML SOLN nebulizer solution Inhale 3 mLs into the lungs every 4 hours  Adi Sesay DO   Fluticasone-Umeclidin-Vilant (TRELEGY ELLIPTA) 200-62.5-25 MCG/INH AEPB Inhale 1 puff into the lungs daily  ERIK Chambers - CNP   Insulin Pen Needle (PEN NEEDLES \") 30G X 8 MM MISC 1 each by Does not apply route daily Insulin 4 times a day.   May change to what ever type of needle is available  [employer], DO celecoxib (CELEBREX) 200 MG capsule Take 1 capsule by mouth daily  Adi Sesay DO   blood glucose test strips (ASCENSIA AUTODISC VI;ONE TOUCH ULTRA TEST VI) strip 1 each by In Vitro route 4 times daily As needed. Adi Sesay DO   insulin aspart (NOVOLOG FLEXPEN) 100 UNIT/ML injection pen Inject insulin 3 times daily before meals according to sliding scale with a max dose of 20 units  Adi Sesay DO   insulin glargine (BASAGLAR KWIKPEN) 100 UNIT/ML injection pen Inject 25 Units into the skin nightly  Adi Sesay DO   metFORMIN (GLUCOPHAGE) 500 MG tablet Take 1 tablet by mouth 2 times daily (with meals)  Adi Sesay DO   Lancets MISC 1 each by Does not apply route 4 times daily  Adi Sesay DO   Blood Glucose Monitoring Suppl (BLOOD GLUCOSE MONITOR SYSTEM) w/Device KIT One Touch Ultra monitor  Adi Sesay DO   blood glucose monitor strips Test 4 times a day & as needed for symptoms of irregular blood glucose.   Adi Sesay DO   citalopram (CELEXA) 40 MG tablet Take 1 tablet by mouth daily Instructed to take am of procedure  Adi Sesay DO   fluticasone (FLONASE) 50 MCG/ACT nasal spray 1 spray by Each Nostril route daily  Adi Sesay DO   albuterol sulfate HFA (PROAIR HFA) 108 (90 Base) MCG/ACT inhaler INHALE TWO PUFFS BY MOUTH FOUR TIMES A DAY AS NEEDED  Adi Sesay DO   aspirin 325 MG EC tablet Take 1 tablet by mouth 2 times daily  Dee Malcolm PA-C   TURMERIC PO Take by mouth daily LD 2-14-20  Historical Provider, MD   ipratropium-albuterol (DUONEB) 0.5-2.5 (3) MG/3ML SOLN nebulizer solution Inhale 1 vial into the lungs every 4 hours as needed for Shortness of Breath Instructed to take am of procedure if needed  Historical Provider, MD   loratadine (CLARITIN) 10 MG capsule Take 10 mg by mouth daily  Patient not taking: Reported on 7/27/2021  Historical Provider, MD   Cholecalciferol (VITAMIN D3) 2000 units CAPS Take 5,000 Units by mouth daily Ld 2-14-20  Historical Provider, MD   Coenzyme Q10 (COQ-10) 10 MG CAPS Take by mouth daily Ld 2-14-20  Historical Provider, MD         Past Medical History:   Diagnosis Date    Anxiety     Arthritis     Asthma     COPD (chronic obstructive pulmonary disease) ([facility] [address]     uses O2 NC 2 L continuous     Depression     Diabetes mellitus ([facility] [address] [facility]     Edema leg     GERD (gastroesophageal reflux disease)     Hypertension     Hypothyroidism     Leukocytosis     Obesity     Osteoarthritis     Sleep apnea     no CPAP     Thyroid disease     Type 2 diabetes mellitus without complication (HCC)        Past Surgical History:   Procedure Laterality Date    CARPAL TUNNEL RELEASE Left 7/1/2019    LEFT CARPAL TUNNEL RELEASE performed by Tasha Abdalla MD at [place] [address]    DILATION AND CURETTAGE OF UTERUS      HERNIA REPAIR      ROTATOR CUFF REPAIR Right     SHOULDER SURGERY Right 2/21/2020    RIGHT SHOULDER TOTAL ARTHROPLASTY REVERSE WITH ISB performed [facility] at [facility]    TONSILLECTOMY AND ADENOIDECTOMY      TOTAL KNEE ARTHROPLASTY Left     TUBAL LIGATION           Family History   Problem Relation Age of Onset    Stroke Mother     Diabetes Type 1  Mother     Heart Attack Father 79    Alcohol Abuse Father     Breast Cancer Sister     Cancer Sister     No Known Problems Brother     High Blood Pressure Sister        CareTeam (Including outside providers/suppliers regularly involved in providing care):   Patient Care Team:  Shawanda Aparicio DO as PCP - General (Family Medicine)  Shawanda Aparicio DO as PCP - [facility] Empaneled Provider  Modesta Cameron MD as Consulting Physician (Pulmonology)  Nikhil Elaine MD as Consulting Physician (Pulmonology)    Wt Readings from Last 3 Encounters:   12/13/21 234 lb (106.1 kg)   11/07/21 220 lb (99.8 kg)   09/15/21 229 lb 9.6 oz (104.1 kg)     Vitals:    12/13/21 1417   BP: 135/83   Pulse: 120   Temp: 96.8 °F (36 °C)   TempSrc: Temporal   SpO2: 93%   Weight: 234 lb (106.1 kg)   Height: 4' 11\" (1.499 m)     Body mass index is 47.26 kg/m². Based upon direct observation of the patient, evaluation of cognition reveals recent and remote memory intact. General Appearance: alert and oriented to person, place and time, well developed and well- nourished, in no acute distress  Skin: warm and dry, no rash or erythema  Head: normocephalic and atraumatic  Eyes: pupils equal, round, and reactive to light, extraocular eye movements intact, conjunctivae normal  ENT: tympanic membrane, external ear and ear canal normal bilaterally, nose without deformity, nasal mucosa and turbinates normal without polyps  Neck: supple and non-tender without mass, no thyromegaly or thyroid nodules, no cervical lymphadenopathy  Pulmonary/Chest: clear to auscultation bilaterally- no wheezes, rales or rhonchi, normal air movement, no respiratory distress  Cardiovascular: normal rate, regular rhythm, normal S1 and S2, no murmurs, rubs, clicks, or gallops, distal pulses intact, no carotid bruits  Abdomen: soft, non-tender, non-distended, normal bowel sounds, no masses or organomegaly  Extremities: no cyanosis, clubbing or edema  Musculoskeletal: normal range of motion, no joint swelling, deformity or tenderness  Neurologic: reflexes normal and symmetric, no cranial nerve deficit, gait, coordination and speech normal    Patient's complete Health Risk Assessment and screening values have been reviewed and are found in Flowsheets. The following problems were reviewed today and where indicated follow up appointments were made and/or referrals ordered. Positive Risk Factor Screenings with Interventions:            General Health and ACP:  General  In general, how would you say your health is?: Good  In the past 7 days, have you experienced any of the following?  New or Increased Pain, New or Increased Fatigue, Loneliness, Social Isolation, Stress or Lipid screen  12/09/2022    Potassium monitoring  12/09/2022    Creatinine monitoring  12/09/2022    DTaP/Tdap/Td vaccine (2 - Td or Tdap) 10/30/2023    Pneumococcal 65+ years Vaccine (1 of 1 - PPSV23) 10/30/2025    Flu vaccine  Completed    Hepatitis A vaccine  Aged Out    Hib vaccine  Aged Out    Meningococcal (ACWY) vaccine  Aged Out     Recommendations for [employer] Due: see orders and patient instructions/AVS.  . Recommended screening schedule for the next 5-10 years is provided to the patient in written form: see Patient Instructions/AVS.    Neil Hernandez was seen today for medicare awv. Diagnoses and all orders for this visit:    Routine general medical examination at a health care facility    Need for influenza vaccination  -     INFLUENZA, QUADV, ADJUVANTED, 72 YRS =, IM, PF, PREFILL SYR, 0.5ML (FLUAD)    Essential hypertension    Type 2 diabetes mellitus without complication, with long-term current use of insulin (HCC)    COPD without exacerbation (HCC)    Mixed hyperlipidemia             lsoe wt    Diet exer  Dec vit d  Qod    fuwith pulm  Cardio hem    lsoe wt           Check blood pressure at home twice a day. Low-salt low caffeine diet. Call if systolic blood pressure is above 755 and diastolic blood pressures above 85. Only use a upper arm digital cuff. Aggressive low-fat diet. Avoid red meats, greasy fried foods, dairy products. Avoid processed foods. Take cholesterol medications without food. Check blood sugars 4 times a day. Aggressive low, sugar low carbohydrate diet. Call if blood sugar less than 70 or over 200. Avoid eating in between meals. Lose weight. Exercise. I educated the patient about all medications. Make sure they were correct and educated  on the risk associated with missing meds or taking them incorrectly. A list of medications is being sent home with patient today.     I informed patient about the risk associated with noncompliance of medication and taking medications incorrectly. Appropriate follow-up with myself and all specialist.  Encourage family members to take active role in assisting with medications and medical care. If any confusion should develop to notify my office immediately to avoid risk of worsening medical condition    A great deal of time spent reviewing medications, diet, exercise, social issues. Also reviewing the chart before entering the room with patient and finishing charting after leaving patient's room. More than half of that time was spent face to face with the patient in counseling and coordinating care.

## 2021-12-17 DIAGNOSIS — Z79.4 TYPE 2 DIABETES MELLITUS WITHOUT COMPLICATION, WITH LONG-TERM CURRENT USE OF INSULIN (HCC): Chronic | ICD-10-CM

## 2021-12-17 DIAGNOSIS — E11.9 TYPE 2 DIABETES MELLITUS WITHOUT COMPLICATION, WITH LONG-TERM CURRENT USE OF INSULIN (HCC): Chronic | ICD-10-CM

## 2021-12-17 NOTE — TELEPHONE ENCOUNTER
Pt stating her blood sugar readings have been high with the decrease in metformin to 1 tab BID. She is requesting to return to her previous dose of 2 tabs BID. New rx pended.

## 2022-01-06 DIAGNOSIS — J43.9 PULMONARY EMPHYSEMA, UNSPECIFIED EMPHYSEMA TYPE (HCC): Chronic | ICD-10-CM

## 2022-01-07 DIAGNOSIS — J43.9 PULMONARY EMPHYSEMA, UNSPECIFIED EMPHYSEMA TYPE (HCC): Chronic | ICD-10-CM

## 2022-01-07 RX ORDER — ALBUTEROL SULFATE 90 UG/1
AEROSOL, METERED RESPIRATORY (INHALATION)
Qty: 25.5 G | Refills: 12 | Status: SHIPPED
Start: 2022-01-07 | End: 2022-01-07 | Stop reason: SDUPTHER

## 2022-01-07 RX ORDER — ALBUTEROL SULFATE 90 UG/1
AEROSOL, METERED RESPIRATORY (INHALATION)
Qty: 25.5 G | Refills: 12 | Status: SHIPPED | OUTPATIENT
Start: 2022-01-07

## 2022-01-07 NOTE — TELEPHONE ENCOUNTER
----- Message from Ophelia Ford sent at 1/6/2022  2:59 PM EST -----  Subject: Refill Request    QUESTIONS  Name of Medication? albuterol sulfate HFA (PROAIR HFA) 108 (90 Base)   MCG/ACT inhaler  Patient-reported dosage and instructions? 2 puffs four times a day as   needed   How many days do you have left? 6  Preferred Pharmacy? [address] [place] [address] phone number (if available)? [phone]  ---------------------------------------------------------------------------  --------------  CALL BACK INFO  What is the best way for the office to contact you? OK to leave message on   voicemail  Preferred Call Back Phone Number?  [phone]

## 2022-01-21 DIAGNOSIS — F41.9 ANXIETY: ICD-10-CM

## 2022-01-21 DIAGNOSIS — M19.011 PRIMARY OSTEOARTHRITIS OF RIGHT SHOULDER: ICD-10-CM

## 2022-01-21 RX ORDER — CELECOXIB 200 MG/1
200 CAPSULE ORAL DAILY
Qty: 90 CAPSULE | Refills: 1 | Status: SHIPPED
Start: 2022-01-21 | End: 2022-07-25 | Stop reason: SDUPTHER

## 2022-01-21 RX ORDER — CITALOPRAM 40 MG/1
40 TABLET ORAL DAILY
Qty: 90 TABLET | Refills: 1 | Status: SHIPPED
Start: 2022-01-21 | End: 2022-08-29 | Stop reason: SDUPTHER

## 2022-01-28 ENCOUNTER — TELEPHONE (OUTPATIENT)
Dept: CARDIOLOGY | Age: 67
End: 2022-01-28

## 2022-01-28 NOTE — TELEPHONE ENCOUNTER
Echo scheduled. Reviewed Covid-19 checklist with the patient.     Electronically signed by Ed Oliva on 1/28/2022 at 10:57 AM

## 2022-01-28 NOTE — TELEPHONE ENCOUNTER
CALLED PATIENT AND LEFT MESSAGE TO SCHEDULE ECHO    Electronically signed by Lina Louis on 1/28/2022 at 9:13 AM

## 2022-02-10 ENCOUNTER — TELEPHONE (OUTPATIENT)
Dept: CARDIOLOGY | Age: 67
End: 2022-02-10

## 2022-02-21 ENCOUNTER — HOSPITAL ENCOUNTER (OUTPATIENT)
Dept: SLEEP CENTER | Age: 67
Discharge: HOME | End: 2022-02-21
Payer: MEDICARE

## 2022-02-21 DIAGNOSIS — G47.33 OSA (OBSTRUCTIVE SLEEP APNEA): ICD-10-CM

## 2022-02-21 PROCEDURE — 95811 POLYSOM 6/>YRS CPAP 4/> PARM: CPT

## 2022-02-22 VITALS
SYSTOLIC BLOOD PRESSURE: 166 MMHG | OXYGEN SATURATION: 88 % | DIASTOLIC BLOOD PRESSURE: 88 MMHG | BODY MASS INDEX: 38.58 KG/M2 | HEIGHT: 64 IN | WEIGHT: 226 LBS | HEART RATE: 99 BPM

## 2022-03-07 ENCOUNTER — HOSPITAL ENCOUNTER (OUTPATIENT)
Dept: CARDIOLOGY | Age: 67
Discharge: HOME | End: 2022-03-07
Payer: MEDICARE

## 2022-03-07 DIAGNOSIS — R06.09 DOE (DYSPNEA ON EXERTION): ICD-10-CM

## 2022-03-07 LAB
LV EF: 68 %
LVEF MODALITY: NORMAL

## 2022-03-07 PROCEDURE — 93306 TTE W/DOPPLER COMPLETE: CPT | Performed by: PSYCHIATRY & NEUROLOGY

## 2022-03-08 NOTE — PROGRESS NOTES
[address] [address]                               SLEEP STUDY REPORT    PATIENT NAME: Inessa Ward                        :        1955  MED REC NO:   [id number]                            ROOM:  ACCOUNT NO:   [de-identified]                           ADMIT DATE: 2022  PROVIDER:     Ignacio Kwok MD    DATE OF STUDY:  2022    PAP TITRATION    INDICATION FOR STUDY:  The patient had a previous study done on  11/10/2021 and an apnea-hypopnea index of 39.5 with hypoxia and lowest  oxygen saturation 75%. PAST MEDICAL HISTORY:  Significant for COPD, hypertension, diabetes,  thyroid disease, arthritis, chronic pain, depression. CURRENT MEDICATIONS:  Include metformin, fluticasone, TRELEGY, Celebrex,  Basaglar KwikPen, Celexa, Flonase, Synthroid, hydrochlorothiazide,  Amaryl, albuterol, Neurontin, NovoLog, Colace, turmeric, biotin,  DuoNebs, Claritin, vitamin D, and CoQ10. The patient had an Sylvester Sleepiness Scale 10/24 and neck circumference  of 18-3/4 inches and a BMI of 38.8. FINDINGS:  SLEEP ARCHITECTURE:  Total recording time was 458.5 minutes. Total  sleep time was 353.5 minutes. Sleep efficacy was 77%. Sleep latency  was 24.2 minutes. SLEEP STAGES:  The patient was awake for 81 minutes. Spent 1.7% in N1,  69% in N2, 9.6% in N3, and 19.7% in REM sleep. RESPIRATORY EVENTS:  During the study, the patient had total of 13  apneas which of those one was central and 12 were obstructive. The  longest duration of the apneic event was 16.5 seconds. HYPOPNEAS:  During the study, the patient had 170 hypopneas. The  longest duration of the hypopneic event was 62.2 seconds. The hypopnea  index was 28.9. The apnea-hypopnea index was 31.1.     AROUSALS:  During the study, the patient had total of 155 arousals which  of those 125 were spontaneous and 28 of those were associated with  respiratory events. LIMB MOVEMENTS:  During the study, the patient had total of 73 limb  movements which of those only three were periodic leg movement variety. The leg movement index was 12.4. OXYGEN SATURATIONS:  The patient's mean oxygen saturation while awake  was 88.7%. The patient's mean oxygen saturation during steady sleep was  87.5%. The patient spent 238.9 minutes with oxygen saturation below 88  and the lowest oxygen saturation was 80%. HEART RATE SUMMARY:  The patient's average heart rate while awake was 97  beats per minute. The patient's average heart rate during steady sleep  was 90.6 beats per minute. TITRATION STUDY:  The patent initially was started on a CPAP of 5 and  then was switched to bilevel for elimination of events. With increasing  bilevels, it appears that the patient's bilevel of 21/16 to 22/16 and  only one apnea and 4 hypopneas. The patient still had significant  oxygen desaturations. RECOMMENDATIONS:  We will recommend an auto PAP with IPAP levels of 15  to 25 and EPAP levels of 10 to 18 with a 20-minute ramp and a small  ResMed AirFit F20 mask. I will also recommend to bleed in 2 liters of  supplemental oxygen. I would recommend to repeat an overnight oximetry  while the patient is wearing her auto BiPAP with 3 liters oxygen bleed  in.         Leyla Martin MD    D: 03/07/2022 23:17:35       T: 03/07/2022 23:22:06     [email or web address]  Job#: [id number]     Doc#: [id number]

## 2022-03-22 ENCOUNTER — TELEPHONE (OUTPATIENT)
Dept: CARDIOLOGY CLINIC | Age: 67
End: 2022-03-22

## 2022-03-22 NOTE — TELEPHONE ENCOUNTER
Patient returned our call and was given echo results per Dr. Aileen Woodruff. She verbalized understanding and will keep scheduled appointment on 3/29/2022.

## 2022-03-22 NOTE — TELEPHONE ENCOUNTER
Left message for patient to contact office. ----- Message from Joann Dick MD sent at 3/21/2022 10:53 AM EDT -----  Overall pumping function and valves appear normal.  Follow up as scheduled.

## 2022-03-29 ENCOUNTER — OFFICE VISIT (OUTPATIENT)
Dept: CARDIOLOGY CLINIC | Age: 67
End: 2022-03-29
Payer: MEDICARE

## 2022-03-29 VITALS
SYSTOLIC BLOOD PRESSURE: 142 MMHG | BODY MASS INDEX: 49.75 KG/M2 | WEIGHT: 237 LBS | DIASTOLIC BLOOD PRESSURE: 72 MMHG | HEIGHT: 58 IN | HEART RATE: 111 BPM | RESPIRATION RATE: 18 BRPM

## 2022-03-29 DIAGNOSIS — I10 ESSENTIAL HYPERTENSION: Primary | ICD-10-CM

## 2022-03-29 PROCEDURE — 93000 ELECTROCARDIOGRAM COMPLETE: CPT | Performed by: INTERNAL MEDICINE

## 2022-03-29 PROCEDURE — 99214 OFFICE O/P EST MOD 30 MIN: CPT | Performed by: INTERNAL MEDICINE

## 2022-03-29 NOTE — PROGRESS NOTES
OUTPATIENT CARDIOLOGY FOLLOW-UP    Name: Nell Farooq    Age: 73 y.o. Primary Care Physician: Ashley Corral DO    Date of Service: 3/29/2022    Chief Complaint:   Chief Complaint   Patient presents with    6 Month Follow-Up    Shortness of Breath        Interim History:   Here for follow-up of dyspnea. Finally had her echo done recently which was difficult study but overall showed normal LV function and no significant valvular disease. RVSP unable to be assessed. Had her sleep study and CPAP titration, and is being set up to have BiPAP. States her shortness of breath is very minimally improved and is able to walk a little bit more than she was. Feels her heart racing when she exerts herself. Denies chest pain. Uses 2 L of oxygen continuously. Occasional lower extremity edema when she salty foods.     Review of Systems:   Negative except as described above    Past Medical History:  Past Medical History:   Diagnosis Date    Anxiety     Arthritis     Asthma     COPD (chronic obstructive pulmonary disease) ([address]     uses O2 NC 2 L continuous     Depression     Diabetes mellitus ([address]     Edema leg     GERD (gastroesophageal reflux disease)     Hypertension     Hypothyroidism     Leukocytosis     Obesity     Osteoarthritis     Sleep apnea     no CPAP     Thyroid disease     Type 2 diabetes mellitus without complication ([address]        Past Surgical History:  Past Surgical History:   Procedure Laterality Date    CARPAL TUNNEL RELEASE Left 7/1/2019    LEFT CARPAL TUNNEL RELEASE performed by Lynder Mohs, [facility] at [facility]    DILATION AND CURETTAGE OF UTERUS      HERNIA REPAIR      ROTATOR CUFF REPAIR Right     SHOULDER SURGERY Right 2/21/2020    RIGHT SHOULDER TOTAL ARTHROPLASTY REVERSE WITH ISB performed by Lynder Mohs, MD at [address] [place] [address] ARTHROPLASTY Left     TUBAL LIGATION         Family History:  Family History   Problem Relation Age of Onset    Stroke Mother     Diabetes Type 1  Mother     Heart Attack Father 79    Alcohol Abuse Father     Breast Cancer Sister     Cancer Sister     No Known Problems Brother     High Blood Pressure Sister        Social History:  Social History     Tobacco Use    Smoking status: Former Smoker     Packs/day: 1.00     Years: 30.00     Pack years: 30.00     Quit date: 2017     Years since quittin.9    Smokeless tobacco: Never Used   Vaping Use    Vaping Use: Former    Start date: 2015   [facility] Quit date: 8/10/2015    Substances: Nicotine    Devices: Refillable tank   Substance Use Topics    Alcohol use: Yes     Comment: rare    Drug use: Never        Allergies: Allergies   Allergen Reactions    Macrobid [Nitrofurantoin Macrocrystal] Other (See Comments)     Exacerbates asthma       Current Medications:    Current Outpatient Medications:     Insulin Pen Needle 31G X 8 MM MISC, Use to inject insulin 4 times daily, Disp: 400 each, Rfl: 3    celecoxib (CELEBREX) 200 MG capsule, Take 1 capsule by mouth daily, Disp: 90 capsule, Rfl: 1    citalopram (CELEXA) 40 MG tablet, Take 1 tablet by mouth daily Instructed to take am of procedure, Disp: 90 tablet, Rfl: 1    albuterol sulfate HFA (PROAIR HFA) 108 (90 Base) MCG/ACT inhaler, INHALE TWO PUFFS BY MOUTH FOUR TIMES A DAY AS NEEDED, Disp: 25.5 g, Rfl: 12    metFORMIN (GLUCOPHAGE) 500 MG tablet, Take 2 tablets by mouth 2 times daily (with meals), Disp: 360 tablet, Rfl: 3    glimepiride (AMARYL) 1 MG tablet, Take 1 tablet by mouth 2 times daily (before meals), Disp: 180 tablet, Rfl: 3    levothyroxine (SYNTHROID) 50 MCG tablet, Take 1 tablet by mouth Daily, Disp: 90 tablet, Rfl: 3    hydroCHLOROthiazide (MICROZIDE) 12.5 MG capsule, Take 1 capsule by mouth daily, Disp: 90 capsule, Rfl: 5    gabapentin (NEURONTIN) 100 MG capsule, Take 1 capsule by mouth nightly for 360 days. , Disp: 90 capsule, Rfl: 3    aspirin 81 MG EC tablet, Take 81 mg by mouth daily, Disp: , Rfl:     Fluticasone-Umeclidin-Vilant (TRELEGY ELLIPTA) 200-62.5-25 MCG/INH AEPB, Inhale 1 puff into the lungs daily, Disp: 1 each, Rfl: 5    Insulin Pen Needle (PEN NEEDLES 5/16\") 30G X 8 MM MISC, 1 each by Does not apply route daily Insulin 4 times a day. May change to what ever type of needle is available, Disp: 100 each, Rfl: 3    blood glucose test strips (ASCENSIA AUTODISC VI;ONE TOUCH ULTRA TEST VI) strip, 1 each by In Vitro route 4 times daily As needed. , Disp: 400 each, Rfl: 12    insulin aspart (NOVOLOG FLEXPEN) 100 UNIT/ML injection pen, Inject insulin 3 times daily before meals according to sliding scale with a max dose of 20 units, Disp: 10 pen, Rfl: 11    insulin glargine (BASAGLAR KWIKPEN) 100 UNIT/ML injection pen, Inject 25 Units into the skin nightly, Disp: 5 pen, Rfl: 12    Lancets MISC, 1 each by Does not apply route 4 times daily, Disp: 400 each, Rfl: 5    Blood Glucose Monitoring Suppl (BLOOD GLUCOSE MONITOR SYSTEM) w/Device KIT, One Touch Ultra monitor, Disp: 1 kit, Rfl: 0    blood glucose monitor strips, Test 4 times a day & as needed for symptoms of irregular blood glucose., Disp: 400 strip, Rfl: 0    TURMERIC PO, Take by mouth daily LD 2-14-20, Disp: , Rfl:     ipratropium-albuterol (DUONEB) 0.5-2.5 (3) MG/3ML SOLN nebulizer solution, Inhale 1 vial into the lungs every 4 hours as needed for Shortness of Breath Instructed to take am of procedure if needed, Disp: , Rfl:     loratadine (CLARITIN) 10 MG capsule, Take 10 mg by mouth daily , Disp: , Rfl:     Cholecalciferol (VITAMIN D3) 2000 units CAPS, Take 5,000 Units by mouth daily Ld 2-14-20, Disp: , Rfl:     Coenzyme Q10 (COQ-10) 10 MG CAPS, Take by mouth daily Ld 2-14-20, Disp: , Rfl:     ipratropium-albuterol (DUONEB) 0.5-2.5 (3) MG/3ML SOLN nebulizer solution, Inhale 3 mLs into the lungs every 4 hours (Patient not taking: Reported on 3/29/2022), Disp: 360 mL, Rfl: 5    fluticasone (FLONASE) 50 MCG/ACT nasal spray, 1 spray by Each Nostril route daily (Patient not taking: Reported on 3/29/2022), Disp: 1 Bottle, Rfl: 12    aspirin 325 MG EC tablet, Take 1 tablet by mouth 2 times daily, Disp: 84 tablet, Rfl: 0    Physical Exam:  BP (!) 142/72   Pulse 111   Resp 18   Ht 4' 10\" (1.473 m)   Wt 237 lb (107.5 kg)   BMI 49.53 kg/m²   Wt Readings from Last 3 Encounters:   03/29/22 237 lb (107.5 kg)   02/24/22 246 lb (111.6 kg)   02/22/22 226 lb (102.5 kg)     Appearance: Obese female, awake, alert and oriented x 3, no acute respiratory distress  Skin: Intact, no rash  Head: Normocephalic, atraumatic  Eyes: EOMI, no conjunctival erythema  ENMT: No pharyngeal erythema, MMM, no rhinorrhea  Neck: Supple, no elevated JVP, no carotid bruits  Lungs: Clear to auscultation bilaterally. No wheezes, rales, or rhonchi.   Cardiac: Regular rhythm with a tachycardic rate, +S1S2, no murmurs apparent  Abdomen: Soft, nontender, +bowel sounds  Extremities: Moves all extremities x 4, no lower extremity edema  Neurologic: No focal motor deficits apparent, normal mood and affect, alert and oriented x 3  Peripheral Pulses: Intact posterior tibial pulses bilaterally    Laboratory Tests:  Lab Results   Component Value Date    CREATININE 0.9 12/09/2021    BUN 16 12/09/2021     12/09/2021    K 4.7 12/09/2021    CL 98 12/09/2021    CO2 22 12/09/2021     No results found for: MG  Lab Results   Component Value Date    WBC 13.8 (H) 12/09/2021    HGB 11.8 12/09/2021    HCT 39.0 12/09/2021    MCV 78.6 (L) 12/09/2021     12/09/2021     Lab Results   Component Value Date    ALT 11 12/09/2021    AST 15 12/09/2021    ALKPHOS 92 12/09/2021    BILITOT 0.3 12/09/2021     No results found for: CKTOTAL, CKMB, CKMBINDEX, TROPONINI  No results found for: INR, PROTIME  Lab Results   Component Value Date    TSH 3.740 10/28/2020     Lab Results   Component Value Date    LABA1C 7.0 (H) 12/09/2021     No results found for: EAG  Lab Results   Component Value Date    CHOL 161 12/09/2021    CHOL 184 06/28/2021    CHOL 143 02/10/2021     Lab Results   Component Value Date    TRIG 166 (H) 12/09/2021    TRIG 225 (H) 06/28/2021    TRIG 154 (H) 02/10/2021     Lab Results   Component Value Date    HDL 43 12/09/2021    HDL 47 06/28/2021    HDL 50 02/10/2021     Lab Results   Component Value Date    LDLCALC 85 12/09/2021    LDLCALC 92 06/28/2021    LDLCALC 62 02/10/2021     Lab Results   Component Value Date    LABVLDL 33 12/09/2021    LABVLDL 45 06/28/2021    LABVLDL 31 02/10/2021     No results found for: CHOLHDLRATIO  No results for input(s): PROBNP in the last 72 hours. Cardiac Tests:  ECG:   3/29/2022: Sinus tachycardia 111 bpm.  Normal axis. Right bundle branch block QRS duration 126 ms. Echocardiogram:   TTE 3/7/2021     Summary   Technically difficult study - limited visualization. Tachycardia noted throughout study. Normal left ventricular size and systolic function. Ejection fraction is visually estimated at 65-70%. Indeterminate diastolic function. No regional wall motion abnormalities seen, but endocardial border not   well visualized and contrast agent not used. Normal right ventricular size and function. No significant valvular abnormalities. Unable to estimate RSVP    Stress test:      Cardiac catheterization:     Orders Placed This Encounter   Procedures    EKG 12 lead        Requested Prescriptions      No prescriptions requested or ordered in this encounter        ASSESSMENT / PLAN:  1. Sinus tachycardia. Likely driven by hypoxia  2. Dyspnea with exertion, multifactorial.  COPD/obesity/probable pulmonary hypertension  3. Right bundle branch block, chronic  4. Hypertension, well controlled  5. Type 2 diabetes, insulin requiring, A1c 7 %  6. COPD/emphysema with chronic hypoxic respiratory failure on continuous O2  7.  SAROJ recently had CPAP titration, being set up for BiPAP with O2  8. Hypothyroidism  9. Morbid obesity, BMI 50 kg/m²  10. Osteoarthritis  11. Depression  12. GERD    Recommendations:  Stable from a cardiac standpoint. Tachycardia due to underlying lung disease and deconditioning. · No specific cardiac treatment required at this time  · Continue to follow-up with pulmonary, treatment of SAROJ with BiPAP planned  · May benefit from pulmonary rehab  · Moderate intensity statin recommended the presence of diabetes  · Increase physical activity as able  · Aggressive risk factor modification including weight loss recommended  · Follow-up in 1 year or sooner if need arises    The patient's current medication list, allergies, problem list and results of all previously ordered testing were reviewed at today's visit.     Abhishek Smith MD, [facility] Cardiology

## 2022-05-04 ENCOUNTER — OFFICE VISIT (OUTPATIENT)
Dept: PRIMARY CARE CLINIC | Age: 67
End: 2022-05-04
Payer: MEDICARE

## 2022-05-04 VITALS
TEMPERATURE: 96.6 F | OXYGEN SATURATION: 90 % | WEIGHT: 240 LBS | DIASTOLIC BLOOD PRESSURE: 82 MMHG | HEIGHT: 58 IN | SYSTOLIC BLOOD PRESSURE: 162 MMHG | BODY MASS INDEX: 50.38 KG/M2 | HEART RATE: 127 BPM

## 2022-05-04 DIAGNOSIS — I10 ESSENTIAL HYPERTENSION: Chronic | ICD-10-CM

## 2022-05-04 DIAGNOSIS — E11.65 UNCONTROLLED TYPE 2 DIABETES MELLITUS WITH HYPERGLYCEMIA (HCC): ICD-10-CM

## 2022-05-04 DIAGNOSIS — G47.33 SLEEP APNEA, OBSTRUCTIVE: ICD-10-CM

## 2022-05-04 DIAGNOSIS — G89.29 CHRONIC TOE PAIN, RIGHT FOOT: ICD-10-CM

## 2022-05-04 DIAGNOSIS — J44.9 COPD WITHOUT EXACERBATION (HCC): Primary | Chronic | ICD-10-CM

## 2022-05-04 DIAGNOSIS — M79.674 CHRONIC TOE PAIN, RIGHT FOOT: ICD-10-CM

## 2022-05-04 PROCEDURE — 99214 OFFICE O/P EST MOD 30 MIN: CPT | Performed by: FAMILY MEDICINE

## 2022-05-04 RX ORDER — VALSARTAN 40 MG/1
40 TABLET ORAL DAILY
Qty: 30 TABLET | Refills: 3 | Status: SHIPPED
Start: 2022-05-04 | End: 2022-06-20 | Stop reason: SDUPTHER

## 2022-05-04 RX ORDER — PREDNISONE 1 MG/1
TABLET ORAL
Qty: 18 TABLET | Refills: 0 | Status: SHIPPED
Start: 2022-05-04 | End: 2022-06-17

## 2022-05-04 ASSESSMENT — PATIENT HEALTH QUESTIONNAIRE - PHQ9
SUM OF ALL RESPONSES TO PHQ QUESTIONS 1-9: 0
SUM OF ALL RESPONSES TO PHQ QUESTIONS 1-9: 0
SUM OF ALL RESPONSES TO PHQ9 QUESTIONS 1 & 2: 0
SUM OF ALL RESPONSES TO PHQ QUESTIONS 1-9: 0
1. LITTLE INTEREST OR PLEASURE IN DOING THINGS: 0
2. FEELING DOWN, DEPRESSED OR HOPELESS: 0
SUM OF ALL RESPONSES TO PHQ QUESTIONS 1-9: 0

## 2022-05-04 ASSESSMENT — ENCOUNTER SYMPTOMS
ALLERGIC/IMMUNOLOGIC NEGATIVE: 1
EYES NEGATIVE: 1
GASTROINTESTINAL NEGATIVE: 1
SHORTNESS OF BREATH: 1

## 2022-05-04 NOTE — PROGRESS NOTES
22  Name: Noreen Dave    : 1955    Sex: female    Age: 78 y.o. Subjective:  Chief Complaint: Patient is here for  bp   Sleep apnea  Copd  arth      elev bp home   170's  To  90's  jus tstarted  Ck bp  Due to ha and   Found to be up  No  Cp or sob  o2 at   2  lier nc  Sees reilly   8-24  falre copd   f ew days  No  Cp    o2   Stays  same      Review of Systems   Constitutional: Positive for fatigue. HENT: Negative. Eyes: Negative. Respiratory: Positive for shortness of breath (with eertion). Cardiovascular: Negative. Gastrointestinal: Negative. Endocrine: Negative. Genitourinary: Negative. Musculoskeletal: Negative. Skin: Negative. Allergic/Immunologic: Negative. Neurological: Negative. Hematological: Negative. Psychiatric/Behavioral: Negative.           Current Outpatient Medications:     valsartan (DIOVAN) 40 MG tablet, Take 1 tablet by mouth daily, Disp: 30 tablet, Rfl: 3    predniSONE (DELTASONE) 5 MG tablet, ONE TID FOR THREE DAYS, ONE BID FOR THREE DAYS, ONE QD FOR THREE DAYS   FOOD  Stop if bs over 200, Disp: 18 tablet, Rfl: 0    Insulin Pen Needle 31G X 8 MM MISC, Use to inject insulin 4 times daily, Disp: 400 each, Rfl: 3    celecoxib (CELEBREX) 200 MG capsule, Take 1 capsule by mouth daily, Disp: 90 capsule, Rfl: 1    citalopram (CELEXA) 40 MG tablet, Take 1 tablet by mouth daily Instructed to take am of procedure, Disp: 90 tablet, Rfl: 1    albuterol sulfate HFA (PROAIR HFA) 108 (90 Base) MCG/ACT inhaler, INHALE TWO PUFFS BY MOUTH FOUR TIMES A DAY AS NEEDED, Disp: 25.5 g, Rfl: 12    metFORMIN (GLUCOPHAGE) 500 MG tablet, Take 2 tablets by mouth 2 times daily (with meals), Disp: 360 tablet, Rfl: 3    glimepiride (AMARYL) 1 MG tablet, Take 1 tablet by mouth 2 times daily (before meals), Disp: 180 tablet, Rfl: 3    levothyroxine (SYNTHROID) 50 MCG tablet, Take 1 tablet by mouth Daily, Disp: 90 tablet, Rfl: 3    hydroCHLOROthiazide (MICROZIDE) 12.5 MG capsule, Take 1 capsule by mouth daily, Disp: 90 capsule, Rfl: 5    gabapentin (NEURONTIN) 100 MG capsule, Take 1 capsule by mouth nightly for 360 days. , Disp: 90 capsule, Rfl: 3    aspirin 81 MG EC tablet, Take 81 mg by mouth daily, Disp: , Rfl:     Fluticasone-Umeclidin-Vilant (TRELEGY ELLIPTA) 200-62.5-25 MCG/INH AEPB, Inhale 1 puff into the lungs daily, Disp: 1 each, Rfl: 5    Insulin Pen Needle (PEN NEEDLES 5/16\") 30G X 8 MM MISC, 1 each by Does not apply route daily Insulin 4 times a day. May change to what ever type of needle is available, Disp: 100 each, Rfl: 3    blood glucose test strips (ASCENSIA AUTODISC VI;ONE TOUCH ULTRA TEST VI) strip, 1 each by In Vitro route 4 times daily As needed. , Disp: 400 each, Rfl: 12    insulin aspart (NOVOLOG FLEXPEN) 100 UNIT/ML injection pen, Inject insulin 3 times daily before meals according to sliding scale with a max dose of 20 units, Disp: 10 pen, Rfl: 11    insulin glargine (BASAGLAR KWIKPEN) 100 UNIT/ML injection pen, Inject 25 Units into the skin nightly, Disp: 5 pen, Rfl: 12    Lancets MISC, 1 each by Does not apply route 4 times daily, Disp: 400 each, Rfl: 5    Blood Glucose Monitoring Suppl (BLOOD GLUCOSE MONITOR SYSTEM) w/Device KIT, One Touch Ultra monitor, Disp: 1 kit, Rfl: 0    blood glucose monitor strips, Test 4 times a day & as needed for symptoms of irregular blood glucose., Disp: 400 strip, Rfl: 0    fluticasone (FLONASE) 50 MCG/ACT nasal spray, 1 spray by Each Nostril route daily (Patient not taking: Reported on 3/29/2022), Disp: 1 Bottle, Rfl: 12    aspirin 325 MG EC tablet, Take 1 tablet by mouth 2 times daily, Disp: 84 tablet, Rfl: 0    TURMERIC PO, Take by mouth daily LD 2-14-20, Disp: , Rfl:     ipratropium-albuterol (DUONEB) 0.5-2.5 (3) MG/3ML SOLN nebulizer solution, Inhale 1 vial into the lungs every 4 hours as needed for Shortness of Breath Instructed to take am of procedure if needed, Disp: , Rfl:     loratadine (CLARITIN) 10 MG capsule, Take 10 mg by mouth daily , Disp: , Rfl:     Cholecalciferol (VITAMIN D3) 2000 units CAPS, Take 5,000 Units by mouth daily Ld 20, Disp: , Rfl:     Coenzyme Q10 (COQ-10) 10 MG CAPS, Take by mouth daily Ld 20, Disp: , Rfl:   Allergies   Allergen Reactions    Macrobid [Nitrofurantoin Macrocrystal] Other (See Comments)     Exacerbates asthma     Social History     Socioeconomic History    Marital status:      Spouse name: Not on file    Number of children: Not on file    Years of education: Not on file    Highest education level: Not on file   Occupational History    Not on file   Tobacco Use    Smoking status: Former Smoker     Packs/day: 1.00     Years: 30.00     Pack years: 30.00     Quit date: 2017     Years since quittin.0    Smokeless tobacco: Never Used   Vaping Use    Vaping Use: Former    Start date: 2015   [facility] Quit date: 8/10/2015    Substances: Nicotine    Devices: [employer] Pharmable tank   Substance and Sexual Activity    Alcohol use: Yes     Comment: rare    Drug use: Never    Sexual activity: Not Currently   Other Topics Concern    Not on file   Social History Narrative        OBESITY    SMOKER----QUIT ------BACK------ QUIT     FH CAD    LEFT SDIED INCISIONAL HERNIA REPAIR DR Kenney Collier     OA KNEES    EDEMA LEGS    FH DM    LEUKOCYTOSIS--DR BARGER IN PAST--THEN DR BOWMAN ----------------------------dr dawna to    DEPRESSION    NIDDM    HTN    ELEV CRP    EARLY HYPOTHYROID    ASTHMA    SLEEP APNEA------C-PAP------pt quit using    D AND C ----DR PALMA    TORN MENISCUS L KNEE OR  DR DIEHL------SEVERE OA L KNEE ON X RAY     EMG  SEVERE CTS L MOD-SEV R---REFER TO DR STEPHENSON    INJECTIONS KNEE DR GTZ    LEFT TOTAL KNEE OR 2-15 DR GTZ--    R CARPAL TUNNEL SURGEYR 3-15 DR GTZ    VITROUS SEPARATION LEFT EYE 11-15    R SHOULDER OR TORN ROTATOR CUFF  DR SURESH---PT NOT HAPPY    [id number] [place]    VENTRAL HERNIA OR DR Abigail Woods 9-17    EVAL DR MARROQUIN 8-18 COPD ADDED O2--------------------dr Chapin    RETIREFERMIN 9-18 DISABILITY 1-19    Left carpal tunnel  7-19 surgery    Right total shoulder arthroplasty   Dr Omar Weeks ----------------------------------------------------------dr Jose Bush     Social Determinants of Health     Financial Resource Strain:     Difficulty of Paying Living Expenses: Not on file   Food Insecurity:     Worried About Running Out of Food in the Last Year: Not on file    Luis Alberto of Food in the Last Year: Not on file   Transportation Needs:     Lack of Transportation (Medical): Not on file    Lack of Transportation (Non-Medical):  Not on file   Physical Activity:     Days of Exercise per Week: Not on file    Minutes of Exercise per Session: Not on file   Stress:     Feeling of Stress : Not on file   Social Connections:     Frequency of Communication with Friends and Family: Not on file    Frequency of Social Gatherings with Friends and Family: Not on file    Attends Restorationist Services: Not on file    Active Member of [address] [place] [address] [employer] or Organizations: Not on file    Attends Club or Organization Meetings: Not on file    Marital Status: Not on file   Intimate Partner Violence:     Fear of Current or Ex-Partner: Not on file    Emotionally Abused: Not on file    Physically Abused: Not on file    Sexually Abused: Not on file   Housing Stability:     Unable to Pay for Housing in the Last Year: Not on file    Number of [place] in the Last Year: Not on file    Unstable Housing in the Last Year: Not on file      Past Medical History:   Diagnosis Date    Anxiety     Arthritis     Asthma     COPD (chronic obstructive [facility] disease) [facility]     uses O2 NC 2 L continuous     Depression     Diabetes mellitus ([facility]     Edema leg     GERD (gastroesophageal reflux disease)     Hypertension     Hypothyroidism     Leukocytosis     Obesity     Osteoarthritis     Sleep apnea     no CPAP     Thyroid disease     Type 2 diabetes mellitus without complication ([facility]      Family History   Problem Relation Age of Onset    Stroke Mother     Diabetes Type 1  Mother     Heart Attack Father 79    Alcohol Abuse Father     Breast Cancer Sister     Cancer Sister     No Known Problems Brother     High Blood Pressure Sister       Past Surgical History:   Procedure Laterality Date    CARPAL TUNNEL RELEASE Left 7/1/2019    LEFT CARPAL TUNNEL RELEASE performed by [facility]hl [facility] at [facility]      times 3    DILATION AND CURETTAGE OF UTERUS      HERNIA REPAIR      ROTATOR CUFF REPAIR Right     SHOULDER SURGERY Right 2/21/2020    RIGHT SHOULDER TOTAL ARTHROPLASTY REVERSE WITH ISB performed by Shayy Diehl MD at [address]     TUBAL LIGATION        Vitals:    05/04/22 1239   BP: (!) 162/82   Pulse: 127   Temp: 96.6 °F (35.9 °C)   TempSrc: Infrared   SpO2: 90%   Weight: 240 lb (108.9 kg)   Height: 4' 10\" (1.473 m)       Objective:    Physical Exam  Vitals reviewed. Constitutional:       Appearance: She is well-developed. She is obese. HENT:      Head: Normocephalic. Eyes:      Pupils: Pupils are equal, round, and reactive to light. Cardiovascular:      Rate and Rhythm: Normal rate and regular rhythm. Pulmonary:      Effort: Pulmonary effort is normal.      Breath sounds: Normal breath sounds. Abdominal:      Palpations: Abdomen is soft. Musculoskeletal:         General: Normal range of motion. Cervical back: Normal range of motion. Skin:     General: Skin is warm. Neurological:      Mental Status: She is alert and oriented to person, place, and time. Psychiatric:         Behavior: Behavior normal.         Edmund Torres was seen today for hypertension.     Diagnoses and all orders for this visit:    COPD without exacerbation ([address]  -     predniSONE (Lakia Blunt) 5 MG tablet; ONE TID FOR THREE DAYS, ONE BID FOR THREE DAYS, ONE QD FOR THREE DAYS   FOOD  Stop if bs over 200    Uncontrolled type 2 diabetes mellitus with hyperglycemia (HCC)    Essential hypertension  -     valsartan (DIOVAN) 40 MG tablet; Take 1 tablet by mouth daily    Sleep apnea, obstructive    Body mass index (BMI) 50.0-59.9, adult (HCC)    Chronic toe pain, right foot  -     [id number] Brandyn Stevenson DPM, Podiatry, [employer]        Comments: Add divovan        pred  shrot course  fuwith dr Natividad goldsmith the    Auto cpap------appt podaotry       I educated the patient about all medications. Make sure they were correct and educated  on the risk associated with missing meds or taking them incorrectly. A list of medications is being sent home with patient today. Check blood pressure at home twice a day. Low-salt low caffeine diet. Call if systolic blood pressure is above 785 and diastolic blood pressures above 85. Only use a upper arm digital cuff. ,Aggressive low-fat diet. Avoid red meats, greasy fried foods, dairy products. Avoid processed foods. Take cholesterol medications without food. Check blood sugars 4 times a day. Aggressive low, sugar low carbohydrate diet. Call if blood sugar less than 70 or over 200. Avoid eating in between meals. Lose weight. Exercise. I informed patient about the risk associated with noncompliance of medication and taking medications incorrectly. Appropriate follow-up with myself and all specialist.  Encourage family members to take active role in assisting with medications and medical care. If any confusion should develop to notify my office immediately to avoid risk of worsening medical condition    A great deal of time spent reviewing medications, diet, exercise, social issues. Also reviewing the chart before entering the room with patient and finishing charting after leaving patient's room.  More than half of that time was spent face to face with the patient in counseling and coordinating care. Follow Up: Return for Reg Appt.      Seen by:  Tulio Ny DO

## 2022-06-06 DIAGNOSIS — I10 ESSENTIAL HYPERTENSION: Chronic | ICD-10-CM

## 2022-06-06 NOTE — TELEPHONE ENCOUNTER
[facility] CLINICAL PHARMACY: STATIN THERAPY REVIEW  Identified statin use in persons with diabetes care gap per [employer]. Records dated: 5/15/22. Last Office Visit: 5/4/22    Patient also appears to be taking: ARB, DM    Patient found in Outcomes MTM and is not currently eligible for CMR/TIP    ASSESSMENT  ACE/ARB ADHERENCE    Insurance Records claims through 5/15/22 (YTD Rohith Childers = Filled only once; Potential Fail Date: 7/20/22): Valsartan 40mg last filled on 5/4/22 for 30 day supply. Next refill due: 6/3/22    Per [employer] Portal and Outcomes MTM Records: last filled on 5/27/22 for 30 day supply. Per chart, new rx 5/4/22 for #30, 3 refills    BP Readings from Last 3 Encounters:   05/04/22 (!) 162/82   03/29/22 (!) 142/72   02/24/22 130/70     Estimated Creatinine Clearance: 70 mL/min (based on SCr of 0.9 mg/dL). DIABETES ADHERENCE    Insurance Records claims through 5/15/22: not reported (insulin)    Lab Results   Component Value Date    LABA1C 7.0 (H) 12/09/2021    LABA1C 6.5 (H) 06/28/2021    LABA1C 7.1 (H) 02/10/2021     NOTE A1c not yet completed this calendar year - 6/13 [facility] IDENTIFIED    Lab Results   Component Value Date    CHOL 161 12/09/2021    TRIG 166 (H) 12/09/2021    HDL 43 12/09/2021    LDLCALC 85 12/09/2021     ALT   Date Value Ref Range Status   12/09/2021 11 0 - 32 U/L Final     AST   Date Value Ref Range Status   12/09/2021 15 0 - 31 U/L Final       The 10-year ASCVD risk score (Mary Cobos, et al., 2013) is: 23.4%    Values used to calculate the score:      Age: 78 years      Sex: Female      Is Non- : No      Diabetic: Yes      Tobacco smoker: No      Systolic Blood Pressure: 053 mmHg      Is BP treated: Yes      HDL Cholesterol: 43 mg/dL      Total Cholesterol: 161 mg/dL     Hyperlipidemia Goal: Patient has a 10-yr ASCVD risk of @23% with DM, HTN; likely a candidate for statin therapy.  Noted baseline LDL <100 - if targeting LDL, could consider goal <70 d/t DM, HTN and 10-year risk >20%. · Per 3/29/22 cardiology progress note: \"Moderate intensity statin recommended the presence of diabetes\"  · Per 2/14/2020 PCP progress note: \"Refuses  Statin therapy\"    PLAN  The following are interventions that have been identified:   - Patient eligible for 90 day supply of valsartan (new rx @5/4 - tolerating? 6/13 appt to assess BP?)  - Patient identified as having SUPD gap - appears patient has refused statin therapy in past (recommended to patient by both PCP and cardiology)    Attempting to reach patient to review.  Left message asking for return call.     Future Appointments   Date Time Provider Caron Bautista   6/13/2022  2:15 PM DO XUAN Barber [facility]   6/14/2022  4:15 PM CHARLEEN Tim [facility]   8/24/2022  3:00 PM ERIK Baumann - NP [facility]       Isaura Champion, PharmD, [facility]  Department, toll free: [phone], option 1

## 2022-06-07 NOTE — TELEPHONE ENCOUNTER
Patient returning call, discussed:  · Statin use/recommendation - states she's previously declined because she doesn't want a medication if she doesn't need one and her cholesterol #s indicate she doesn't need one. Reviewed potential LDL goal and the focus on 10-year ASCVD risk score of why PCP and cardiologist have recommended one. Patient states she will further discuss with PCP at next week's appt.    · Reviewed eligible for 90-ds valsartan if taking/tolerating and continued after f/u appt next week - patient prefers and will ask

## 2022-06-14 ENCOUNTER — OFFICE VISIT (OUTPATIENT)
Dept: PODIATRY | Age: 67
End: 2022-06-14
Payer: MEDICARE

## 2022-06-14 VITALS
TEMPERATURE: 97.8 F | WEIGHT: 235 LBS | BODY MASS INDEX: 49.12 KG/M2 | DIASTOLIC BLOOD PRESSURE: 78 MMHG | SYSTOLIC BLOOD PRESSURE: 144 MMHG

## 2022-06-14 DIAGNOSIS — E11.9 TYPE 2 DIABETES MELLITUS WITHOUT COMPLICATION, WITH LONG-TERM CURRENT USE OF INSULIN (HCC): ICD-10-CM

## 2022-06-14 DIAGNOSIS — M79.675 PAIN IN LEFT TOE(S): ICD-10-CM

## 2022-06-14 DIAGNOSIS — M81.0 AGE-RELATED OSTEOPOROSIS WITHOUT CURRENT PATHOLOGICAL FRACTURE: ICD-10-CM

## 2022-06-14 DIAGNOSIS — M20.11 HALLUX VALGUS OF RIGHT FOOT: Primary | ICD-10-CM

## 2022-06-14 DIAGNOSIS — E03.9 ACQUIRED HYPOTHYROIDISM: ICD-10-CM

## 2022-06-14 DIAGNOSIS — E78.2 MIXED HYPERLIPIDEMIA: ICD-10-CM

## 2022-06-14 DIAGNOSIS — I73.9 PERIPHERAL VASCULAR DISEASE, UNSPECIFIED (HCC): ICD-10-CM

## 2022-06-14 DIAGNOSIS — M79.674 PAIN IN TOE OF RIGHT FOOT: ICD-10-CM

## 2022-06-14 DIAGNOSIS — E11.9 ENCOUNTER FOR DIABETIC FOOT EXAM (HCC): ICD-10-CM

## 2022-06-14 DIAGNOSIS — R26.2 DIFFICULTY WALKING: ICD-10-CM

## 2022-06-14 DIAGNOSIS — I10 ESSENTIAL HYPERTENSION: Chronic | ICD-10-CM

## 2022-06-14 DIAGNOSIS — B35.1 TINEA UNGUIUM: ICD-10-CM

## 2022-06-14 DIAGNOSIS — E11.9 TYPE 2 DIABETES MELLITUS WITHOUT COMPLICATION, WITH LONG-TERM CURRENT USE OF INSULIN (HCC): Chronic | ICD-10-CM

## 2022-06-14 DIAGNOSIS — Z79.4 TYPE 2 DIABETES MELLITUS WITHOUT COMPLICATION, WITH LONG-TERM CURRENT USE OF INSULIN (HCC): ICD-10-CM

## 2022-06-14 DIAGNOSIS — Z79.4 TYPE 2 DIABETES MELLITUS WITHOUT COMPLICATION, WITH LONG-TERM CURRENT USE OF INSULIN (HCC): Chronic | ICD-10-CM

## 2022-06-14 LAB
ALBUMIN SERPL-MCNC: 4.2 G/DL (ref 3.5–5.2)
ALP BLD-CCNC: 88 U/L (ref 35–104)
ALT SERPL-CCNC: 15 U/L (ref 0–32)
ANION GAP SERPL CALCULATED.3IONS-SCNC: 25 MMOL/L (ref 7–16)
ANISOCYTOSIS: ABNORMAL
AST SERPL-CCNC: 19 U/L (ref 0–31)
BACTERIA: ABNORMAL /HPF
BASOPHILS ABSOLUTE: 0.1 E9/L (ref 0–0.2)
BASOPHILS RELATIVE PERCENT: 0.6 % (ref 0–2)
BILIRUB SERPL-MCNC: 0.3 MG/DL (ref 0–1.2)
BILIRUBIN URINE: NEGATIVE
BLOOD, URINE: NEGATIVE
BUN BLDV-MCNC: 19 MG/DL (ref 6–23)
BURR CELLS: ABNORMAL
CALCIUM SERPL-MCNC: 9.7 MG/DL (ref 8.6–10.2)
CHLORIDE BLD-SCNC: 92 MMOL/L (ref 98–107)
CHOLESTEROL, TOTAL: 182 MG/DL (ref 0–199)
CLARITY: ABNORMAL
CO2: 19 MMOL/L (ref 22–29)
COLOR: YELLOW
CREAT SERPL-MCNC: 1 MG/DL (ref 0.5–1)
EOSINOPHILS ABSOLUTE: 0.26 E9/L (ref 0.05–0.5)
EOSINOPHILS RELATIVE PERCENT: 1.6 % (ref 0–6)
EPITHELIAL CELLS, UA: ABNORMAL /HPF
GFR AFRICAN AMERICAN: >60
GFR NON-AFRICAN AMERICAN: 55 ML/MIN/1.73
GLUCOSE BLD-MCNC: 241 MG/DL (ref 74–99)
GLUCOSE URINE: NEGATIVE MG/DL
HBA1C MFR BLD: 6.9 % (ref 4–5.6)
HCT VFR BLD CALC: 43.3 % (ref 34–48)
HDLC SERPL-MCNC: 52 MG/DL
HEMOGLOBIN: 12.3 G/DL (ref 11.5–15.5)
HYPOCHROMIA: ABNORMAL
IMMATURE GRANULOCYTES #: 0.17 E9/L
IMMATURE GRANULOCYTES %: 1 % (ref 0–5)
KETONES, URINE: ABNORMAL MG/DL
LDL CHOLESTEROL CALCULATED: 96 MG/DL (ref 0–99)
LEUKOCYTE ESTERASE, URINE: NEGATIVE
LYMPHOCYTES ABSOLUTE: 3.74 E9/L (ref 1.5–4)
LYMPHOCYTES RELATIVE PERCENT: 22.6 % (ref 20–42)
MCH RBC QN AUTO: 23.7 PG (ref 26–35)
MCHC RBC AUTO-ENTMCNC: 28.4 % (ref 32–34.5)
MCV RBC AUTO: 83.4 FL (ref 80–99.9)
MICROALBUMIN UR-MCNC: 118.7 MG/L
MONOCYTES ABSOLUTE: 0.9 E9/L (ref 0.1–0.95)
MONOCYTES RELATIVE PERCENT: 5.4 % (ref 2–12)
NEUTROPHILS ABSOLUTE: 11.41 E9/L (ref 1.8–7.3)
NEUTROPHILS RELATIVE PERCENT: 68.8 % (ref 43–80)
NITRITE, URINE: NEGATIVE
OVALOCYTES: ABNORMAL
PDW BLD-RTO: 16.7 FL (ref 11.5–15)
PH UA: 6 (ref 5–9)
PLATELET # BLD: 356 E9/L (ref 130–450)
PMV BLD AUTO: 12.2 FL (ref 7–12)
POIKILOCYTES: ABNORMAL
POLYCHROMASIA: ABNORMAL
POTASSIUM SERPL-SCNC: 5.5 MMOL/L (ref 3.5–5)
PROTEIN UA: ABNORMAL MG/DL
RBC # BLD: 5.19 E12/L (ref 3.5–5.5)
RBC UA: ABNORMAL /HPF (ref 0–2)
SODIUM BLD-SCNC: 136 MMOL/L (ref 132–146)
SPECIFIC GRAVITY UA: 1.02 (ref 1–1.03)
T4 TOTAL: 9.8 MCG/DL (ref 4.5–11.7)
TOTAL PROTEIN: 7.8 G/DL (ref 6.4–8.3)
TRIGL SERPL-MCNC: 172 MG/DL (ref 0–149)
TSH SERPL DL<=0.05 MIU/L-ACNC: 2.71 UIU/ML (ref 0.27–4.2)
UROBILINOGEN, URINE: 0.2 E.U./DL
VLDLC SERPL CALC-MCNC: 34 MG/DL
WBC # BLD: 16.6 E9/L (ref 4.5–11.5)
WBC UA: ABNORMAL /HPF (ref 0–5)

## 2022-06-14 PROCEDURE — 11721 DEBRIDE NAIL 6 OR MORE: CPT | Performed by: PODIATRIST

## 2022-06-14 PROCEDURE — 1123F ACP DISCUSS/DSCN MKR DOCD: CPT | Performed by: PODIATRIST

## 2022-06-14 PROCEDURE — 99203 OFFICE O/P NEW LOW 30 MIN: CPT | Performed by: PODIATRIST

## 2022-06-14 NOTE — PROGRESS NOTES
1 [facility] PODIATRY  [address]  Dept: [phone]  Dept Fax: [phone]  Loc: [phone]    DIABETIC NAIL PROGRESS NOTE  Date of patient's visit: 6/14/2022  Patient's Name:  Carmie Nissen YOB: 1955            Patient Care Team:  Ric Chance DO as PCP - General (Family Medicine)  Ric Chance DO as PCP - [facility]  Aura Bowen MD as Consulting Physician (Pulmonology)  Jannie Evans MD as Consulting Physician (Pulmonology)  Meghan Kendall MD as Consulting Physician (Cardiology)          Chief Complaint   Patient presents with    New Patient     referred by Anais Horta last visit 5/4/22    Referral - General    Diabetes     dm foot examination and dm nail care     Bunions     right foot and arthritis right foot        Subjective:   Dada Amador comes to clinic for New Patient (referred by Anais Horta last visit 5/4/22), Referral - General, Diabetes (dm foot examination and dm nail care ), and Bunions (right foot and arthritis right foot )    she is a diabetic and states that diabetic foot exam nail care and a painful right bunion. Patient relates that the bunion has been bothering her off and on for many years progressively getting worse keeping her up at night. No trauma noted. .  Pt currently has complaint of thickened, elongated nails that they cannot manage by themselves. Pt's primary care physician is Ric Chance DO    . Lab Results   Component Value Date    LABA1C 7.0 (H) 12/09/2021      Complains of numbness in the feet bilat.   Past Medical History:   Diagnosis Date    Anxiety     Arthritis     Asthma     COPD (chronic obstructive pulmonary disease) ([facility] [address]     uses O2 NC 2 L continuous     Depression     Diabetes mellitus (HCC)     Edema leg     GERD (gastroesophageal reflux disease)     Hypertension     Hypothyroidism     Leukocytosis     Obesity     Osteoarthritis     Sleep apnea     no CPAP     Thyroid disease     Type 2 diabetes mellitus without complication (HCC)        Allergies   Allergen Reactions    Macrobid [Nitrofurantoin Macrocrystal] Other (See Comments)     Exacerbates asthma     Current Outpatient Medications on File Prior to Visit   Medication Sig Dispense Refill    valsartan (DIOVAN) 40 MG tablet Take 1 tablet by mouth daily 30 tablet 3    Insulin Pen Needle 31G X 8 MM MISC Use to inject insulin 4 times daily 400 each 3    celecoxib (CELEBREX) 200 MG capsule Take 1 capsule by mouth daily 90 capsule 1    citalopram (CELEXA) 40 MG tablet Take 1 tablet by mouth daily Instructed to take am of procedure 90 tablet 1    albuterol sulfate HFA (PROAIR HFA) 108 (90 Base) MCG/ACT inhaler INHALE TWO PUFFS BY MOUTH FOUR TIMES A DAY AS NEEDED 25.5 g 12    metFORMIN (GLUCOPHAGE) 500 MG tablet Take 2 tablets by mouth 2 times daily (with meals) 360 tablet 3    glimepiride (AMARYL) 1 MG tablet Take 1 tablet by mouth 2 times daily (before meals) 180 tablet 3    levothyroxine (SYNTHROID) 50 MCG tablet Take 1 tablet by mouth Daily 90 tablet 3    hydroCHLOROthiazide (MICROZIDE) 12.5 MG capsule Take 1 capsule by mouth daily 90 capsule 5    gabapentin (NEURONTIN) 100 MG capsule Take 1 capsule by mouth nightly for 360 days. 90 capsule 3    aspirin 81 MG EC tablet Take 81 mg by mouth daily      Fluticasone-Umeclidin-Vilant (TRELEGY ELLIPTA) 200-62.5-25 MCG/INH AEPB Inhale 1 puff into the lungs daily 1 each 5    Insulin Pen Needle (PEN NEEDLES 5/16\") 30G X 8 MM MISC 1 each by Does not apply route daily Insulin 4 times a day.   May change to what ever type of needle is available 100 each 3    insulin aspart (NOVOLOG FLEXPEN) 100 UNIT/ML injection pen Inject insulin 3 times daily before meals according to sliding scale with a max dose of 20 units 10 pen 11    insulin glargine (BASAGLAR KWIKPEN) 100 UNIT/ML injection pen Inject 25 Units into the skin nightly 5 pen 12    Lancets MISC 1 each by Does not apply route 4 times daily 400 each 5    Blood Glucose Monitoring Suppl (BLOOD GLUCOSE MONITOR SYSTEM) w/Device KIT One Touch Ultra monitor 1 kit 0    blood glucose monitor strips Test 4 times a day & as needed for symptoms of irregular blood glucose. 400 strip 0    fluticasone (FLONASE) 50 MCG/ACT nasal spray 1 spray by Each Nostril route daily 1 Bottle 12    TURMERIC PO Take by mouth daily LD 2-14-20      ipratropium-albuterol (DUONEB) 0.5-2.5 (3) MG/3ML SOLN nebulizer solution Inhale 1 vial into the lungs every 4 hours as needed for Shortness of Breath Instructed to take am of procedure if needed      loratadine (CLARITIN) 10 MG capsule Take 10 mg by mouth daily       Cholecalciferol (VITAMIN D3) 2000 units CAPS Take 5,000 Units by mouth daily Ld 2-14-20      Coenzyme Q10 (COQ-10) 10 MG CAPS Take by mouth daily Ld 2-14-20      predniSONE (DELTASONE) 5 MG tablet ONE TID FOR THREE DAYS, ONE BID FOR THREE DAYS, ONE QD FOR THREE DAYS   FOOD  Stop if bs over 200 18 tablet 0    blood glucose test strips (ASCENSIA AUTODISC VI;ONE TOUCH ULTRA TEST VI) strip 1 each by In Vitro route 4 times daily As needed. 400 each 12    aspirin 325 MG EC tablet Take 1 tablet by mouth 2 times daily 84 tablet 0     No current facility-administered medications on file prior to visit. Review of Systems    Review of Systems:   History obtained from chart review and the patient  General ROS: negative for - chills, fatigue, fever, night sweats or weight gain  Constitutional: Negative for chills, diaphoresis, fatigue, fever and unexpected weight change. Musculoskeletal: Positive for arthralgias, gait problem and joint swelling. Neurological ROS: negative for - behavioral changes, confusion, headaches or seizures. Negative for weakness and numbness. Dermatological ROS: negative for - mole changes, rash  Cardiovascular: Negative for leg swelling. Gastrointestinal: Negative for constipation, diarrhea, nausea and vomiting. Objective:  General: AAO x 3 in NAD. Derm  Toenail Description nails are thick and mycotic yellow incurvated causing pain with shoe gear  Sites of Onychomycosis Involvement (Check affected area)  [x] [x] [x] [x] [x] [x] [x] [x] [x] [x]  5 4 3 2 1 1 2 3 4 5                          Right                                        Left    Thickness  [x] [x] [x] [x] [x] [x] [x] [x] [x] [x]  5 4 3 2 1 1 2 3 4 5                         Right                                        Left    Dystrophic Changes   [x] [x] [x] [x] [x] [x] [x] [x] [x] [x]  5 4 3 2 1 1 2 3 4 5                         Right                                        Left    Color   [x] [x] [x] [x] [x] [x] [x] [x] [x] [x]  5 4 3 2 1 1 2 3 4 5                          Right                                        Left    Incurvation/Ingrowin   [x] [x] [x] [x] [x] [x] [x] [x] [x] [x]  5 4 3 2 1 1 2 3 4 5                         Right                                        Left    Inflammation/Pain   [x] [x] [x] [x] [x] [x] [x] [x] [x] [x]  5 4 3 2 1 1 2 3 4 5                         Right                                        Left      Dermatologic Exam:  Skin lesion/ulceration negative.    Skin skin has no ulcer  Loss of hair  lower extremity      Musculoskeletal:   Hallux abductovalgus noted first digit right foot  1st MPJ ROM decreased, Bilateral.  Muscle Bilateral.  Pain present upon palpation of toenails 1 through 5 bilaterally extremely long thick pitting mycotic incurvated again skin, Bilateral. decreased medial longitudinal arch, Bilateral.  Ankle ROM decreased,Bilateral.    Dorsally contracted digits negative    Vascular: DP and PT pulses pulses are DP palpable PT absent CFT <3 seconds, Bilateral.  Hair growth absent to the level of the digits, Bilateral.  Edema negative bilateral.  Varicosities negative bilateral.     Neurological: Sensation diminshed to light touch to level of digits, Bilateral.  Protective sensation intact sites via 5.07/10g [place] Monofilament, Bilateral.  negative Tinel's, Bilateral.  negative Valleix sign, Bilateral.      Integument: nails   thickened > 3.0 mm, dystrophic and crumbly, discolored with subungual debris. Toes cool to touch    Visual inspection:  Deformity: hammertoe deformity becca feet  amputation: absent    Edema:   Sensory exam:  Monofilament sensation:- 6/10 via SW 5.07/10g monofilament to the plantar foot bilateral feet    X-ray of the right foot shows severe hallux abductovalgus with collapsed first metatarsophalangeal joint joint. DM with PVD       [x]Yes    []no    Foot Exam     Ortho Exam      Assessment:  78 y.o. female with:  1. Hallux valgus of right foot    2. Type 2 diabetes mellitus without complication, with long-term current use of insulin ([address]    3. Tinea unguium    4. Pain in left toe(s)    5. Pain in toe of right foot    6. Peripheral vascular disease, unspecified ([facility] [address]    7. Difficulty walking    8. Encounter for diabetic foot exam ([facility] [address]       Orders Placed This Encounter   Procedures    Diabetic Shoe    XR FOOT RIGHT (MIN 3 VIEWS)     Standing Status:   Future     Number of Occurrences:   1     Standing Expiration Date:   6/14/2023     Order Specific Question:   Reason for exam:     Answer:   pain    HM DIABETES FOOT EXAM           Q7   []Yes    []No                Q8   [x]Yes    []No                     Q9   []Yes    []No    Plan: Today I reviewed the x-rays with the patient will start off conservatively diabetic shoes as well as Voltaren gel 4 times a day. Pt was evaluated and examined. Patient was given personalized discharge instructions. Nails 1-10 were debrided sharply in length and thickness with a nipper and , without incident. Pt will follow up in 3 months or sooner if any problems arise. Diagnosis was discussed with the pt and all of their questions were answered in detail.

## 2022-06-15 LAB — VITAMIN D 25-HYDROXY: 100 NG/ML (ref 30–100)

## 2022-06-16 NOTE — TELEPHONE ENCOUNTER
Adi Sesay DO, from below, appt with you 6/17/22  - Discussed statin use/recommendation with patient who has refused/declined multiple times in past. Patient was open to further discussing at appt with you.   - Patient eligible for up to 100-day supply of valsartan if rx continued with BP review at tomorrow's appt.      Thank you,  Justine Champion, PharmD, [facility]  Department, toll free: [phone], option 1

## 2022-06-17 ENCOUNTER — OFFICE VISIT (OUTPATIENT)
Dept: PRIMARY CARE CLINIC | Age: 67
End: 2022-06-17
Payer: MEDICARE

## 2022-06-17 DIAGNOSIS — J44.9 COPD WITHOUT EXACERBATION (HCC): Primary | Chronic | ICD-10-CM

## 2022-06-17 DIAGNOSIS — E11.9 TYPE 2 DIABETES MELLITUS WITHOUT COMPLICATION, WITH LONG-TERM CURRENT USE OF INSULIN (HCC): Chronic | ICD-10-CM

## 2022-06-17 DIAGNOSIS — E78.2 MIXED HYPERLIPIDEMIA: ICD-10-CM

## 2022-06-17 DIAGNOSIS — I10 ESSENTIAL HYPERTENSION: Chronic | ICD-10-CM

## 2022-06-17 DIAGNOSIS — Z79.4 TYPE 2 DIABETES MELLITUS WITHOUT COMPLICATION, WITH LONG-TERM CURRENT USE OF INSULIN (HCC): Chronic | ICD-10-CM

## 2022-06-17 DIAGNOSIS — G47.33 SLEEP APNEA, OBSTRUCTIVE: ICD-10-CM

## 2022-06-17 DIAGNOSIS — N18.31 STAGE 3A CHRONIC KIDNEY DISEASE (HCC): ICD-10-CM

## 2022-06-17 DIAGNOSIS — K64.8 OTHER HEMORRHOIDS: ICD-10-CM

## 2022-06-17 DIAGNOSIS — M81.0 AGE-RELATED OSTEOPOROSIS WITHOUT CURRENT PATHOLOGICAL FRACTURE: ICD-10-CM

## 2022-06-17 PROBLEM — N18.30 CHRONIC RENAL DISEASE, STAGE III (HCC): Status: ACTIVE | Noted: 2022-06-17

## 2022-06-17 PROCEDURE — 1123F ACP DISCUSS/DSCN MKR DOCD: CPT | Performed by: FAMILY MEDICINE

## 2022-06-17 PROCEDURE — 3044F HG A1C LEVEL LT 7.0%: CPT | Performed by: FAMILY MEDICINE

## 2022-06-17 PROCEDURE — 99214 OFFICE O/P EST MOD 30 MIN: CPT | Performed by: FAMILY MEDICINE

## 2022-06-17 NOTE — PROGRESS NOTES
22  Name: Talia Salmeron    : 1955    Sex: female    Age: 78 y.o. Subjective:  Chief Complaint: Patient is here for 6-month checkup regarding diabetes cholesterol COPD sleep apnea thyroid weight right bundle branch block. Hemorrhoids. Patient presents accompanied by daughter. Her breathing is stable. She is wearing her oxygen.  2 L. She did not get her CPAP due to backorder. She sees pulmonary on . This is her first visit on [place] because her blood pressure is elevated a month ago. Her white count is elevated she will seen twice by hematology in the past is a chronic condition. She should also she was told by pathology Dr.el to he has needed. [facility] refill for hemorrhoid cream.  They are stable with no pain or bleeding      Review of Systems   Constitutional: Negative. HENT: Negative. Eyes: Negative. Respiratory: Positive for shortness of breath (Stable). Cardiovascular: Negative. Gastrointestinal: Negative. Endocrine: Negative. Genitourinary: Negative. Musculoskeletal: Negative. Skin: Negative. Allergic/Immunologic: Negative. Neurological: Negative. Hematological: Negative. Psychiatric/Behavioral: Negative. Current Outpatient Medications:     rosuvastatin (CRESTOR) 10 MG tablet, Take 1 tablet by mouth daily, Disp: 90 tablet, Rfl: 5    hydrocortisone 2.5 % cream, Apply topically 4  times daily. , Disp: 120 g, Rfl: 2    diclofenac sodium (VOLTAREN) 1 % GEL, Apply 4 g topically 4 times daily, Disp: 350 g, Rfl: 2    valsartan (DIOVAN) 40 MG tablet, Take 1 tablet by mouth daily, Disp: 30 tablet, Rfl: 3    Insulin Pen Needle 31G X 8 MM MISC, Use to inject insulin 4 times daily, Disp: 400 each, Rfl: 3    celecoxib (CELEBREX) 200 MG capsule, Take 1 capsule by mouth daily, Disp: 90 capsule, Rfl: 1    citalopram (CELEXA) 40 MG tablet, Take 1 tablet by mouth daily Instructed to take am of procedure, Disp: 90 tablet, Rfl: 1   albuterol sulfate HFA (PROAIR HFA) 108 (90 Base) MCG/ACT inhaler, INHALE TWO PUFFS BY MOUTH FOUR TIMES A DAY AS NEEDED, Disp: 25.5 g, Rfl: 12    metFORMIN (GLUCOPHAGE) 500 MG tablet, Take 2 tablets by mouth 2 times daily (with meals), Disp: 360 tablet, Rfl: 3    levothyroxine (SYNTHROID) 50 MCG tablet, Take 1 tablet by mouth Daily, Disp: 90 tablet, Rfl: 3    hydroCHLOROthiazide (MICROZIDE) 12.5 MG capsule, Take 1 capsule by mouth daily, Disp: 90 capsule, Rfl: 5    gabapentin (NEURONTIN) 100 MG capsule, Take 1 capsule by mouth nightly for 360 days. , Disp: 90 capsule, Rfl: 3    aspirin 81 MG EC tablet, Take 81 mg by mouth daily, Disp: , Rfl:     Fluticasone-Umeclidin-Vilant (TRELEGY ELLIPTA) 200-62.5-25 MCG/INH AEPB, Inhale 1 puff into the lungs daily, Disp: 1 each, Rfl: 5    Insulin Pen Needle (PEN NEEDLES 5/16\") 30G X 8 MM MISC, 1 each by Does not apply route daily Insulin 4 times a day. May change to what ever type of needle is available, Disp: 100 each, Rfl: 3    blood glucose test strips (ASCENSIA AUTODISC VI;ONE TOUCH ULTRA TEST VI) strip, 1 each by In Vitro route 4 times daily As needed. , Disp: 400 each, Rfl: 12    insulin aspart (NOVOLOG FLEXPEN) 100 UNIT/ML injection pen, Inject insulin 3 times daily before meals according to sliding scale with a max dose of 20 units, Disp: 10 pen, Rfl: 11    insulin glargine (BASAGLAR KWIKPEN) 100 UNIT/ML injection pen, Inject 25 Units into the skin nightly, Disp: 5 pen, Rfl: 12    Lancets MISC, 1 each by Does not apply route 4 times daily, Disp: 400 each, Rfl: 5    Blood Glucose Monitoring Suppl (BLOOD GLUCOSE MONITOR SYSTEM) w/Device KIT, One Touch Ultra monitor, Disp: 1 kit, Rfl: 0    blood glucose monitor strips, Test 4 times a day & as needed for symptoms of irregular blood glucose., Disp: 400 strip, Rfl: 0    fluticasone (FLONASE) 50 MCG/ACT nasal spray, 1 spray by Each Nostril route daily, Disp: 1 Bottle, Rfl: 12    TURMERIC PO, Take by mouth daily LD 20, Disp: , Rfl:     ipratropium-albuterol (DUONEB) 0.5-2.5 (3) MG/3ML SOLN nebulizer solution, Inhale 1 vial into the lungs every 4 hours as needed for Shortness of Breath Instructed to take am of procedure if needed, Disp: , Rfl:     loratadine (CLARITIN) 10 MG capsule, Take 10 mg by mouth daily , Disp: , Rfl:     Cholecalciferol (VITAMIN D3) 2000 units CAPS, Take 5,000 Units by mouth daily Ld 20, Disp: , Rfl:     Coenzyme Q10 (COQ-10) 10 MG CAPS, Take by mouth daily Ld 20, Disp: , Rfl:   Allergies   Allergen Reactions    Macrobid [Nitrofurantoin Macrocrystal] Other (See Comments)     Exacerbates asthma     Social History     Socioeconomic History    Marital status:      Spouse name: Not on file    Number of children: Not on file    Years of education: Not on file    Highest education level: Not on file   Occupational History    Not on file   Tobacco Use    Smoking status: Former Smoker     Packs/day: 1.00     Years: 30.00     Pack years: 30.00     Quit date: 2017     Years since quittin.1    Smokeless tobacco: Never Used   Vaping Use    Vaping Use: Former    Start date: 2015   Ramirez Quit date: 8/10/2015    Substances: Nicotine    Devices: Refillable tank   Substance and Sexual Activity    Alcohol use: Yes     Comment: rare    Drug use: Never    Sexual activity: Not Currently   Other Topics Concern    Not on file   Social History Narrative        OBESITY    SMOKER----QUIT ------BACK------ QUIT     FH CAD    LEFT SDIED INCISIONAL HERNIA REPAIR DR Nini Romero     OA KNEES    EDEMA LEGS    FH DM    LEUKOCYTOSIS--DR BARGER IN PAST--THEN DR BOWMAN ----------------------------dr dawna to    DEPRESSION    NIDDM    HTN    ELEV CRP    EARLY HYPOTHYROID    ASTHMA    SLEEP APNEA------C-PAP------pt quit using    D AND C ----DR PALMA    TORN MENISCUS L KNEE OR  DR DIEHL------SEVERE OA L KNEE ON X RAY     EMG  SEVERE CTS L MOD-SEV R---REFER TO  [facility]    INJECTIONS KNEE DR GTZ    LEFT TOTAL KNEE OR 2-15 DR GTZ--    R CARPAL TUNNEL SURGEYR 3-15 DR GTZ    VITROUS SEPARATION LEFT EYE 11-15    R SHOULDER OR TORN ROTATOR CUFF 6-16 DR SURESH---PT NOT HAPPY    PT MOVED BACK HOME 2-17    VENTRAL HERNIA OR DR Patric Dance 9-17    EVAL DR MARROQUIN 8-18 COPD ADDED O2--------------------dr Chapin    RETIRED 9-18 DISABILITY 1-19    Left carpal tunnel  7-19 surgery    Right total shoulder arthroplasty   Dr Yana Jacob ----------------------------------------------------------dr Jin Many     Social Determinants of Health     Financial Resource Strain:     Difficulty of Paying Living Expenses: Not on file   Food Insecurity:     Worried About [facility] in the Last Year: Not on file    Luis Alberto of Food in the Last Year: Not on file   Transportation Needs:     Lack of Transportation (Medical): Not on file    Lack of Transportation (Non-Medical):  Not on file   Physical Activity:     Days of Exercise per Week: Not on file    Minutes of Exercise per Session: Not on file   Stress:     Feeling of Stress : Not on file   Social Connections:     Frequency of Communication with Friends and Family: Not on file    Frequency of Social Gatherings with Friends and Family: Not on file    Attends Christianity Services: Not on file    Active Member of [address] [place] [address] [employer] or Organizations: Not on file    Attends Club or Organization Meetings: Not on file    Marital Status: Not on file   Intimate Partner Violence:     Fear of Current or Ex-Partner: Not on file    Emotionally Abused: Not on file    Physically Abused: Not on file    Sexually Abused: Not on file   Housing Stability:     Unable to Pay for Housing in the Last Year: Not on file    Number of [place] in the Last Year: Not on file    Unstable Housing in the Last Year: Not on file      Past Medical History:   Diagnosis Date    Anxiety     Arthritis     Asthma     COPD (chronic obstructive pulmonary disease) (HCC)     uses O2 NC 2 L continuous     Depression     Diabetes mellitus ([address]     Edema leg     GERD (gastroesophageal reflux disease)     Hypertension     Hypothyroidism     Leukocytosis     Obesity     Osteoarthritis     Sleep apnea     no CPAP     Thyroid disease     Type 2 diabetes mellitus without complication ([address]      Family History   Problem Relation Age of Onset    Stroke Mother     Diabetes Type 1  Mother     Heart Attack Father 79    Alcohol Abuse Father     Breast Cancer Sister     Cancer Sister     No Known Problems Brother     High Blood Pressure Sister       Past Surgical History:   Procedure Laterality Date    CARPAL TUNNEL RELEASE Left 7/1/2019    LEFT CARPAL TUNNEL RELEASE performed by Kishore Paulino MD at [address]      times 3    DILATION AND CURETTAGE OF UTERUS      HERNIA REPAIR      ROTATOR CUFF REPAIR Right     SHOULDER SURGERY Right 2/21/2020    RIGHT SHOULDER TOTAL ARTHROPLASTY REVERSE WITH ISB performed by Kishore Paulino MD at [address] Left     TUBAL LIGATION        Vitals:    06/17/22 1320   BP: 132/78   Pulse: 98   Temp: 97 °F (36.1 °C)   SpO2: 96%   Weight: 245 lb (111.1 kg)       Objective:    Physical Exam  Vitals reviewed. Constitutional:       Appearance: She is well-developed. She is obese. HENT:      Head: Normocephalic. Eyes:      Pupils: Pupils are equal, round, and reactive to light. Cardiovascular:      Rate and Rhythm: Normal rate and regular rhythm. Pulmonary:      Effort: Pulmonary effort is normal.      Breath sounds: Wheezing present. Abdominal:      Palpations: Abdomen is soft. Musculoskeletal:         General: Normal range of motion. Cervical back: Normal range of motion. Skin:     General: Skin is warm. Neurological:      Mental Status: She is alert and oriented to person, place, and time. Psychiatric:         Behavior: Behavior normal.         Diagnoses and all orders for this visit:    COPD without exacerbation ([address]    Essential hypertension    Type 2 diabetes mellitus without complication, with long-term current use of insulin (HCC)    Sleep apnea, obstructive    Stage 3a chronic kidney disease (HCC)    Other hemorrhoids  -     hydrocortisone 2.5 % cream; Apply topically 4  times daily. Mixed hyperlipidemia  -     rosuvastatin (CRESTOR) 10 MG tablet; Take 1 tablet by mouth daily        Comments: Hemorrhoid cream.  Crestor 10 mg daily. Discontinue Amaryl. Check blood pressure blood sugars at home. Follow-up with pulmonary      I educated the patient about all medications. Make sure they were correct and educated  on the risk associated with missing meds or taking them incorrectly. A list of medications is being sent home with patient today. Check blood pressure at home twice a day. Low-salt low caffeine diet. Call if systolic blood pressure is above 513 and diastolic blood pressures above 85. Only use a upper arm digital cuff. Aggressive low-fat diet. Avoid red meats, greasy fried foods, dairy products. Avoid processed foods. Take cholesterol medications without food. Check blood sugars 4 times a day. Aggressive low, sugar low carbohydrate diet. Call if blood sugar less than 70 or over 200. Avoid eating in between meals. Lose weight. Exercise. I informed patient about the risk associated with noncompliance of medication and taking medications incorrectly. Appropriate follow-up with myself and all specialist.  Encourage family members to take active role in assisting with medications and medical care. If any confusion should develop to notify my office immediately to avoid risk of worsening medical condition        A great deal of time spent reviewing medications, diet, exercise, social issues.  Also reviewing the chart before entering the room with patient and finishing charting after leaving patient's room. More than half of that time was spent face to face with the patient in counseling and coordinating care. Follow Up: Return in about 4 months (around 10/17/2022) for Lab Before.      Seen by:  Darwin Aggarwal,

## 2022-06-18 VITALS
WEIGHT: 245 LBS | TEMPERATURE: 97 F | HEART RATE: 98 BPM | BODY MASS INDEX: 51.21 KG/M2 | DIASTOLIC BLOOD PRESSURE: 78 MMHG | OXYGEN SATURATION: 96 % | SYSTOLIC BLOOD PRESSURE: 132 MMHG

## 2022-06-18 RX ORDER — ROSUVASTATIN CALCIUM 10 MG/1
10 TABLET, COATED ORAL DAILY
Qty: 90 TABLET | Refills: 5 | Status: SHIPPED | OUTPATIENT
Start: 2022-06-18

## 2022-06-18 ASSESSMENT — PATIENT HEALTH QUESTIONNAIRE - PHQ9
SUM OF ALL RESPONSES TO PHQ QUESTIONS 1-9: 0
SUM OF ALL RESPONSES TO PHQ9 QUESTIONS 1 & 2: 0
2. FEELING DOWN, DEPRESSED OR HOPELESS: 0
1. LITTLE INTEREST OR PLEASURE IN DOING THINGS: 0
SUM OF ALL RESPONSES TO PHQ QUESTIONS 1-9: 0

## 2022-06-18 ASSESSMENT — ENCOUNTER SYMPTOMS
SHORTNESS OF BREATH: 1
ALLERGIC/IMMUNOLOGIC NEGATIVE: 1
EYES NEGATIVE: 1
GASTROINTESTINAL NEGATIVE: 1

## 2022-06-20 RX ORDER — VALSARTAN 40 MG/1
40 TABLET ORAL DAILY
Qty: 100 TABLET | Refills: 1 | Status: SHIPPED | OUTPATIENT
Start: 2022-06-20

## 2022-06-20 NOTE — TELEPHONE ENCOUNTER
Noted rosuvastatin ordered, as well as A1c completed. Appears is to continue valsartan - will pend 100-ds refill request to PCP.

## 2022-06-20 NOTE — TELEPHONE ENCOUNTER
Delio Gordillo, DO, can we update rx to 100-day supply for patient convenience and copay savings?  Rx pended for your signature/modification as appropriate    LOV: 6/17/22  Next: 10/17/22    Thank you,  Cami Champion, PharmD, [facility]  Department, toll free: [phone], option 1

## 2022-06-20 NOTE — TELEPHONE ENCOUNTER
[facility] pharmacy confirms 100-ds valsartan rx received. Was too soon to fill, but they will use when patient is due for refill in next 1-2 weeks. Then inquired with pharmacy if rosuvastatin had been filled from 6/18 rx (and cost if billed to [employer]) - staff stated it appeared to have been processed and put me on hold to verify cost/coverage and if patient had picked up yet. On hold >10 minutes, so ended call.     Will close encounter and further f/u if notification received that rosuvastatin not picked up by patient.    =======================================================   For Pharmacy Admin Tracking Only     CPA in place:  No   Recommendation Provided To: Provider: 2 via Note to Provider and Patient/Caregiver: 2 via Telephone   Intervention Detail: New Rx: 2, reason: Improve Adherence, Needs Additional Therapy   Gap Closed?: Yes    Intervention Accepted By: Provider: 2 and Patient/Caregiver: 2   Time Spent (min): 35

## 2022-06-22 DIAGNOSIS — E11.9 TYPE 2 DIABETES MELLITUS WITHOUT COMPLICATION, WITH LONG-TERM CURRENT USE OF INSULIN (HCC): Primary | ICD-10-CM

## 2022-06-22 DIAGNOSIS — Z79.4 TYPE 2 DIABETES MELLITUS WITHOUT COMPLICATION, WITH LONG-TERM CURRENT USE OF INSULIN (HCC): Primary | ICD-10-CM

## 2022-06-23 RX ORDER — LANCETS 30 GAUGE
1 EACH MISCELLANEOUS 4 TIMES DAILY
Qty: 400 EACH | Refills: 5 | Status: SHIPPED | OUTPATIENT
Start: 2022-06-23

## 2022-07-25 DIAGNOSIS — M19.011 PRIMARY OSTEOARTHRITIS OF RIGHT SHOULDER: ICD-10-CM

## 2022-07-25 RX ORDER — CELECOXIB 200 MG/1
200 CAPSULE ORAL DAILY
Qty: 90 CAPSULE | Refills: 3 | Status: SHIPPED | OUTPATIENT
Start: 2022-07-25

## 2022-08-12 DIAGNOSIS — Z79.4 TYPE 2 DIABETES MELLITUS WITHOUT COMPLICATION, WITH LONG-TERM CURRENT USE OF INSULIN (HCC): ICD-10-CM

## 2022-08-12 DIAGNOSIS — E11.9 TYPE 2 DIABETES MELLITUS WITHOUT COMPLICATION, WITH LONG-TERM CURRENT USE OF INSULIN (HCC): ICD-10-CM

## 2022-08-12 RX ORDER — INSULIN ASPART 100 [IU]/ML
INJECTION, SOLUTION INTRAVENOUS; SUBCUTANEOUS
Qty: 10 PEN | Refills: 11 | Status: SHIPPED | OUTPATIENT
Start: 2022-08-12

## 2022-08-12 RX ORDER — PEN NEEDLE, DIABETIC 30 GX5/16"
1 NEEDLE, DISPOSABLE MISCELLANEOUS DAILY
Qty: 100 EACH | Refills: 3 | Status: SHIPPED | OUTPATIENT
Start: 2022-08-12

## 2022-08-28 DIAGNOSIS — E11.9 TYPE 2 DIABETES MELLITUS WITHOUT COMPLICATION, WITH LONG-TERM CURRENT USE OF INSULIN (HCC): ICD-10-CM

## 2022-08-28 DIAGNOSIS — Z79.4 TYPE 2 DIABETES MELLITUS WITHOUT COMPLICATION, WITH LONG-TERM CURRENT USE OF INSULIN (HCC): ICD-10-CM

## 2022-08-29 DIAGNOSIS — F41.9 ANXIETY: ICD-10-CM

## 2022-08-29 RX ORDER — INSULIN GLARGINE 100 [IU]/ML
25 INJECTION, SOLUTION SUBCUTANEOUS NIGHTLY
Qty: 5 PEN | Refills: 12 | Status: SHIPPED | OUTPATIENT
Start: 2022-08-29

## 2022-08-30 RX ORDER — CITALOPRAM 40 MG/1
40 TABLET ORAL DAILY
Qty: 90 TABLET | Refills: 5 | Status: SHIPPED | OUTPATIENT
Start: 2022-08-30

## 2022-09-13 ENCOUNTER — TELEPHONE (OUTPATIENT)
Dept: PRIMARY CARE CLINIC | Age: 67
End: 2022-09-13

## 2022-09-13 NOTE — TELEPHONE ENCOUNTER
The pt bought Methyl b12 5000 mcg supplement, she is calling asking if you thought that it is okay for her to take it

## 2022-10-18 ENCOUNTER — PROCEDURE VISIT (OUTPATIENT)
Dept: PODIATRY | Age: 67
End: 2022-10-18
Payer: MEDICARE

## 2022-10-18 VITALS
DIASTOLIC BLOOD PRESSURE: 84 MMHG | WEIGHT: 236 LBS | SYSTOLIC BLOOD PRESSURE: 144 MMHG | TEMPERATURE: 97.2 F | BODY MASS INDEX: 49.32 KG/M2

## 2022-10-18 DIAGNOSIS — Z79.4 TYPE 2 DIABETES MELLITUS WITHOUT COMPLICATION, WITH LONG-TERM CURRENT USE OF INSULIN (HCC): ICD-10-CM

## 2022-10-18 DIAGNOSIS — I73.9 PERIPHERAL VASCULAR DISEASE, UNSPECIFIED (HCC): ICD-10-CM

## 2022-10-18 DIAGNOSIS — B35.1 TINEA UNGUIUM: ICD-10-CM

## 2022-10-18 DIAGNOSIS — M79.674 PAIN IN TOE OF RIGHT FOOT: ICD-10-CM

## 2022-10-18 DIAGNOSIS — E11.9 ENCOUNTER FOR DIABETIC FOOT EXAM (HCC): ICD-10-CM

## 2022-10-18 DIAGNOSIS — M20.11 HALLUX VALGUS OF RIGHT FOOT: Primary | ICD-10-CM

## 2022-10-18 DIAGNOSIS — R26.2 DIFFICULTY WALKING: ICD-10-CM

## 2022-10-18 DIAGNOSIS — E11.9 TYPE 2 DIABETES MELLITUS WITHOUT COMPLICATION, WITH LONG-TERM CURRENT USE OF INSULIN (HCC): ICD-10-CM

## 2022-10-18 DIAGNOSIS — M79.675 PAIN IN LEFT TOE(S): ICD-10-CM

## 2022-10-18 PROCEDURE — 11721 DEBRIDE NAIL 6 OR MORE: CPT | Performed by: PODIATRIST

## 2022-10-18 NOTE — PROGRESS NOTES
[address] [facility]  [address] [place]  [place] [address]  Dept: [phone]  Dept Fax: [phone]  Loc: [phone]    DIABETIC NAIL PROGRESS NOTE  Date of patient's visit: 10/18/2022  Patient's Name:  Tati Bryan YOB: 1955            Patient Care Team:  Maral Darden DO as PCP - General (Family Medicine)  Maral Darden DO as PCP - [facility]  Dottie Barkley MD as Consulting Physician (Pulmonology)  Jose L Oliva MD as Consulting Physician (Pulmonology)  Sylvia Acosta MD as Consulting Physician (Cardiology)  Jyoti Soni DPM as Consulting Physician (Podiatry)          Chief Complaint   Patient presents with    Toe Pain    Foot Pain     Saw pcp Dr. Tory Zamudio 9/20/22    Diabetes    Bunions       Subjective:   Tati Bryan comes to clinic for Toe Pain, Foot Pain (Saw pcp Dr. Tory Zamudio 9/20/22), Diabetes, and Bunions    she is a diabetic and states that diabetic foot exam nail care and a painful right bunion. Patient relates that the bunion has been bothering her off and on for many years progressively getting worse keeping her up at night. No trauma noted. .  Pt currently has complaint of thickened, elongated nails that they cannot manage by themselves. Pt's primary care physician is Maral Darden DO    . Lab Results   Component Value Date    LABA1C 6.9 (H) 06/14/2022      Complains of numbness in the feet bilat.   Past Medical History:   Diagnosis Date    Anxiety     Arthritis     Asthma     COPD (chronic obstructive pulmonary disease) ([facility]     uses O2 NC 2 L continuous     Depression     Diabetes mellitus (HCC)     Edema leg     GERD (gastroesophageal reflux disease)     Hypertension     Hypothyroidism     Leukocytosis     Obesity     Osteoarthritis     Sleep apnea     no CPAP     Thyroid disease     Type 2 diabetes mellitus without complication (HCC)        Allergies Allergen Reactions    Macrobid [Nitrofurantoin Macrocrystal] Other (See Comments)     Exacerbates asthma     Current Outpatient Medications on File Prior to Visit   Medication Sig Dispense Refill    citalopram (CELEXA) 40 MG tablet Take 1 tablet by mouth daily Instructed to take am of procedure 90 tablet 5    insulin glargine (BASAGLAR KWIKPEN) 100 UNIT/ML injection pen Inject 25 Units into the skin nightly 5 pen 12    insulin aspart (NOVOLOG FLEXPEN) 100 UNIT/ML injection pen Inject insulin 3 times daily before meals according to sliding scale with a max dose of 20 units 10 pen 11    Insulin Pen Needle (PEN NEEDLES 5/16\") 30G X 8 MM MISC 1 each by Does not apply route daily Insulin 4 times a day. May change to what ever type of needle is available 100 each 3    Fluticasone-Umeclidin-Vilant (TRELEGY ELLIPTA) 200-62.5-25 MCG/INH AEPB Inhale 1 puff into the lungs in the morning. 1 each 5    celecoxib (CELEBREX) 200 MG capsule Take 1 capsule by mouth in the morning. 90 capsule 3    Lancets MISC 1 each by Does not apply route 4 times daily 400 each 5    valsartan (DIOVAN) 40 MG tablet Take 1 tablet by mouth daily 100 tablet 1    rosuvastatin (CRESTOR) 10 MG tablet Take 1 tablet by mouth daily 90 tablet 5    hydrocortisone 2.5 % cream Apply topically 4  times daily.  120 g 2    diclofenac sodium (VOLTAREN) 1 % GEL Apply 4 g topically 4 times daily 350 g 2    Insulin Pen Needle 31G X 8 MM MISC Use to inject insulin 4 times daily 400 each 3    albuterol sulfate HFA (PROAIR HFA) 108 (90 Base) MCG/ACT inhaler INHALE TWO PUFFS BY MOUTH FOUR TIMES A DAY AS NEEDED 25.5 g 12    metFORMIN (GLUCOPHAGE) 500 MG tablet Take 2 tablets by mouth 2 times daily (with meals) 360 tablet 3    levothyroxine (SYNTHROID) 50 MCG tablet Take 1 tablet by mouth Daily 90 tablet 3    hydroCHLOROthiazide (MICROZIDE) 12.5 MG capsule Take 1 capsule by mouth daily 90 capsule 5    gabapentin (NEURONTIN) 100 MG capsule Take 1 capsule by mouth nightly for 360 days. 90 capsule 3    aspirin 81 MG EC tablet Take 81 mg by mouth daily      Blood Glucose Monitoring Suppl (BLOOD GLUCOSE MONITOR SYSTEM) w/Device KIT One Touch Ultra monitor 1 kit 0    blood glucose monitor strips Test 4 times a day & as needed for symptoms of irregular blood glucose. 400 strip 0    fluticasone (FLONASE) 50 MCG/ACT nasal spray 1 spray by Each Nostril route daily 1 Bottle 12    TURMERIC PO Take by mouth daily LD 2-14-20      ipratropium-albuterol (DUONEB) 0.5-2.5 (3) MG/3ML SOLN nebulizer solution Inhale 1 vial into the lungs every 4 hours as needed for Shortness of Breath Instructed to take am of procedure if needed      loratadine (CLARITIN) 10 MG capsule Take 10 mg by mouth daily       Cholecalciferol (VITAMIN D3) 2000 units CAPS Take 5,000 Units by mouth daily Ld 2-14-20      Coenzyme Q10 (COQ-10) 10 MG CAPS Take by mouth daily Ld 2-14-20       No current facility-administered medications on file prior to visit. Review of Systems    Review of Systems:   History obtained from chart review and the patient  General ROS: negative for - chills, fatigue, fever, night sweats or weight gain  Constitutional: Negative for chills, diaphoresis, fatigue, fever and unexpected weight change. Musculoskeletal: Positive for arthralgias, gait problem and joint swelling. Neurological ROS: negative for - behavioral changes, confusion, headaches or seizures. Negative for weakness and numbness. Dermatological ROS: negative for - mole changes, rash  Cardiovascular: Negative for leg swelling. Gastrointestinal: Negative for constipation, diarrhea, nausea and vomiting. Objective:  General: AAO x 3 in NAD.     Derm  Toenail Description nails are thick and mycotic yellow incurvated causing pain with shoe gear  Sites of Onychomycosis Involvement (Check affected area)  [x] [x] [x] [x] [x] [x] [x] [x] [x] [x]  5 4 3 2 1 1 2 3 4 5                          Right Left    Thickness  [x] [x] [x] [x] [x] [x] [x] [x] [x] [x]  5 4 3 2 1 1 2 3 4 5                         Right                                        Left    Dystrophic Changes   [x] [x] [x] [x] [x] [x] [x] [x] [x] [x]  5 4 3 2 1 1 2 3 4 5                         Right                                        Left    Color   [x] [x] [x] [x] [x] [x] [x] [x] [x] [x]  5 4 3 2 1 1 2 3 4 5                          Right                                        Left    Incurvation/Ingrowin   [x] [x] [x] [x] [x] [x] [x] [x] [x] [x]  5 4 3 2 1 1 2 3 4 5                         Right                                        Left    Inflammation/Pain   [x] [x] [x] [x] [x] [x] [x] [x] [x] [x]  5 4 3 2 1 1 2 3 4 5                         Right                                        Left      Dermatologic Exam:  Skin lesion/ulceration negative. Skin skin has no ulcer  Loss of hair  lower extremity      Musculoskeletal:   Hallux abductovalgus noted first digit right foot  1st MPJ ROM decreased, Bilateral.  Muscle Bilateral.  Pain present upon palpation of toenails 1 through 5 bilaterally extremely long thick pitting mycotic incurvated again skin, Bilateral. decreased medial longitudinal arch, Bilateral.  Ankle ROM decreased,Bilateral.    Dorsally contracted digits negative    Vascular: DP and PT pulses pulses are DP palpable PT absent CFT <3 seconds, Bilateral.  Hair growth absent to the level of the digits, Bilateral.  Edema negative bilateral.  Varicosities negative bilateral.     Neurological: Sensation diminshed to light touch to level of digits, Bilateral.  Protective sensation intact sites via 5.07/10g [place] Monofilament, Bilateral.  negative Tinel's, Bilateral.  negative Valleix sign, Bilateral.      Integument: nails   thickened > 3.0 mm, dystrophic and crumbly, discolored with subungual debris.     Toes cool to touch    Visual inspection:  Deformity: hammertoe deformity becca feet  amputation: absent    Edema: Sensory exam:  Monofilament sensation:- 6/10 via SW 5.07/10g monofilament to the plantar foot bilateral feet    X-ray of the right foot shows severe hallux abductovalgus with collapsed first metatarsophalangeal joint joint. DM with PVD       [x]Yes    []no    Foot Exam     Ortho Exam      Assessment:  78 y.o. female with:  1. Hallux valgus of right foot    2. Type 2 diabetes mellitus without complication, with long-term current use of insulin ([facility]    3. Tinea unguium    4. Pain in left toe(s)    5. Pain in toe of right foot    6. Peripheral vascular disease, unspecified ([address]    7. Difficulty walking    8. Encounter for diabetic foot exam ([facility]         No orders of the defined types were placed in this encounter. Q7   []Yes    []No                Q8   [x]Yes    []No                     Q9   []Yes    []No    Plan: Pt was evaluated and examined. Patient was given personalized discharge instructions. Nails 1-10 were debrided sharply in length and thickness with a nipper and , without incident. Pt will follow up in 3 months or sooner if any problems arise. Diagnosis was discussed with the pt and all of their questions were answered in detail. Proper foot hygiene and care was discussed with the pt. Informed patient on proper diabetic foot care and importance of tight glycemic control. Patient to check feet daily and contact the office with any questions/problems/concerns. Other comorbidity noted and will be managed by PCP. @ED   It was discussed in detail with the patient proper hygiene for the diabetic foot. They are to get into a habit of looking at their feet or have someone look at them. If they are unable to daily, they are to look for any signs of redness, blistering, cracking, swelling, drainage, open lesions, etc.  They are to dry in-between the toes after each bath or shower gently. The water should be tested with their elbow to prevent burns.   The patient is to refrain from soaking their feet unless instructed by myself to do otherwise. They are to refrain from going barefoot. Shoe gear should be inspected for any foreign objects. Shoes should have a deep, wide toe box area. With any type of shoe, the feet should be inspected for any signs of pressure, i.e., redness, blistering, or open lesions. The patient was instructed to contact myself or other health care provider with any questions.    Electronically signed by Wilner Robledo DPM on 10/18/2022 at 3:25 PM  10/18/2022

## 2022-10-20 DIAGNOSIS — Z79.4 TYPE 2 DIABETES MELLITUS WITHOUT COMPLICATION, WITH LONG-TERM CURRENT USE OF INSULIN (HCC): Chronic | ICD-10-CM

## 2022-10-20 DIAGNOSIS — N18.31 STAGE 3A CHRONIC KIDNEY DISEASE (HCC): ICD-10-CM

## 2022-10-20 DIAGNOSIS — E11.9 TYPE 2 DIABETES MELLITUS WITHOUT COMPLICATION, WITH LONG-TERM CURRENT USE OF INSULIN (HCC): Chronic | ICD-10-CM

## 2022-10-20 DIAGNOSIS — M81.0 AGE-RELATED OSTEOPOROSIS WITHOUT CURRENT PATHOLOGICAL FRACTURE: ICD-10-CM

## 2022-10-20 DIAGNOSIS — I10 ESSENTIAL HYPERTENSION: Chronic | ICD-10-CM

## 2022-10-20 LAB
ALBUMIN SERPL-MCNC: 4 G/DL (ref 3.5–5.2)
ALP BLD-CCNC: 81 U/L (ref 35–104)
ALT SERPL-CCNC: 13 U/L (ref 0–32)
ANION GAP SERPL CALCULATED.3IONS-SCNC: 12 MMOL/L (ref 7–16)
AST SERPL-CCNC: 21 U/L (ref 0–31)
BASOPHILS ABSOLUTE: 0.12 E9/L (ref 0–0.2)
BASOPHILS RELATIVE PERCENT: 1 % (ref 0–2)
BILIRUB SERPL-MCNC: <0.2 MG/DL (ref 0–1.2)
BUN BLDV-MCNC: 21 MG/DL (ref 6–23)
CALCIUM SERPL-MCNC: 9.2 MG/DL (ref 8.6–10.2)
CHLORIDE BLD-SCNC: 98 MMOL/L (ref 98–107)
CHOLESTEROL, TOTAL: 114 MG/DL (ref 0–199)
CO2: 26 MMOL/L (ref 22–29)
CREAT SERPL-MCNC: 0.9 MG/DL (ref 0.5–1)
EOSINOPHILS ABSOLUTE: 0.34 E9/L (ref 0.05–0.5)
EOSINOPHILS RELATIVE PERCENT: 2.9 % (ref 0–6)
GFR SERPL CREATININE-BSD FRML MDRD: >60 ML/MIN/1.73
GLUCOSE BLD-MCNC: 180 MG/DL (ref 74–99)
HBA1C MFR BLD: 7.4 % (ref 4–5.6)
HCT VFR BLD CALC: 40.2 % (ref 34–48)
HDLC SERPL-MCNC: 46 MG/DL
HEMOGLOBIN: 12.1 G/DL (ref 11.5–15.5)
IMMATURE GRANULOCYTES #: 0.11 E9/L
IMMATURE GRANULOCYTES %: 0.9 % (ref 0–5)
LDL CHOLESTEROL CALCULATED: 38 MG/DL (ref 0–99)
LYMPHOCYTES ABSOLUTE: 2.65 E9/L (ref 1.5–4)
LYMPHOCYTES RELATIVE PERCENT: 22.3 % (ref 20–42)
MCH RBC QN AUTO: 25.1 PG (ref 26–35)
MCHC RBC AUTO-ENTMCNC: 30.1 % (ref 32–34.5)
MCV RBC AUTO: 83.4 FL (ref 80–99.9)
MICROALBUMIN UR-MCNC: 52.9 MG/L
MONOCYTES ABSOLUTE: 0.76 E9/L (ref 0.1–0.95)
MONOCYTES RELATIVE PERCENT: 6.4 % (ref 2–12)
NEUTROPHILS ABSOLUTE: 7.9 E9/L (ref 1.8–7.3)
NEUTROPHILS RELATIVE PERCENT: 66.5 % (ref 43–80)
PDW BLD-RTO: 15.8 FL (ref 11.5–15)
PLATELET # BLD: 293 E9/L (ref 130–450)
PMV BLD AUTO: 12.3 FL (ref 7–12)
POTASSIUM SERPL-SCNC: 4.9 MMOL/L (ref 3.5–5)
RBC # BLD: 4.82 E12/L (ref 3.5–5.5)
SODIUM BLD-SCNC: 136 MMOL/L (ref 132–146)
TOTAL PROTEIN: 7.2 G/DL (ref 6.4–8.3)
TRIGL SERPL-MCNC: 151 MG/DL (ref 0–149)
VLDLC SERPL CALC-MCNC: 30 MG/DL
WBC # BLD: 11.9 E9/L (ref 4.5–11.5)

## 2022-10-21 LAB — VITAMIN D 25-HYDROXY: 76 NG/ML (ref 30–100)

## 2022-10-22 ENCOUNTER — OFFICE VISIT (OUTPATIENT)
Dept: PRIMARY CARE CLINIC | Age: 67
End: 2022-10-22
Payer: MEDICARE

## 2022-10-22 DIAGNOSIS — I45.10 RBBB: ICD-10-CM

## 2022-10-22 DIAGNOSIS — E03.9 ACQUIRED HYPOTHYROIDISM: ICD-10-CM

## 2022-10-22 DIAGNOSIS — E11.42 TYPE 2 DIABETES MELLITUS WITH DIABETIC POLYNEUROPATHY, WITH LONG-TERM CURRENT USE OF INSULIN (HCC): Primary | ICD-10-CM

## 2022-10-22 DIAGNOSIS — J44.9 COPD WITHOUT EXACERBATION (HCC): Chronic | ICD-10-CM

## 2022-10-22 DIAGNOSIS — Z23 NEED FOR INFLUENZA VACCINATION: ICD-10-CM

## 2022-10-22 DIAGNOSIS — M81.0 AGE-RELATED OSTEOPOROSIS WITHOUT CURRENT PATHOLOGICAL FRACTURE: ICD-10-CM

## 2022-10-22 DIAGNOSIS — M19.041 PRIMARY OSTEOARTHRITIS OF BOTH HANDS: ICD-10-CM

## 2022-10-22 DIAGNOSIS — E79.0 HYPERURICEMIA: ICD-10-CM

## 2022-10-22 DIAGNOSIS — I10 ESSENTIAL HYPERTENSION: Chronic | ICD-10-CM

## 2022-10-22 DIAGNOSIS — Z79.4 TYPE 2 DIABETES MELLITUS WITH DIABETIC POLYNEUROPATHY, WITH LONG-TERM CURRENT USE OF INSULIN (HCC): Primary | ICD-10-CM

## 2022-10-22 DIAGNOSIS — E53.8 VITAMIN B 12 DEFICIENCY: ICD-10-CM

## 2022-10-22 DIAGNOSIS — M19.042 PRIMARY OSTEOARTHRITIS OF BOTH HANDS: ICD-10-CM

## 2022-10-22 DIAGNOSIS — M19.011 PRIMARY OSTEOARTHRITIS OF RIGHT SHOULDER: Chronic | ICD-10-CM

## 2022-10-22 PROCEDURE — 90694 VACC AIIV4 NO PRSRV 0.5ML IM: CPT | Performed by: FAMILY MEDICINE

## 2022-10-22 PROCEDURE — 1123F ACP DISCUSS/DSCN MKR DOCD: CPT | Performed by: FAMILY MEDICINE

## 2022-10-22 PROCEDURE — 3051F HG A1C>EQUAL 7.0%<8.0%: CPT | Performed by: FAMILY MEDICINE

## 2022-10-22 PROCEDURE — G0008 ADMIN INFLUENZA VIRUS VAC: HCPCS | Performed by: FAMILY MEDICINE

## 2022-10-22 PROCEDURE — 99214 OFFICE O/P EST MOD 30 MIN: CPT | Performed by: FAMILY MEDICINE

## 2022-10-22 NOTE — PROGRESS NOTES
10/22/22  Name: Naa Reyes    : 1955    Sex: female    Age: 78 y.o. Subjective:  Chief Complaint: Patient is here for  4 mo ck  re   diab   bp chl copd  sleep a aptne  thryoid  wieght   rbbb          Patient presents with daughter. Below. Easily short of breath but chronic. Pulmonary 6 months can just do in February. Her bilateral hands ache chronically. She requests referral to Dr. León Harris. Chronic low back pain. Gabapentin helps a lot but I discussed increasing the dose. She is due to see cardiology in 2023      Review of Systems   Constitutional: Negative. HENT: Negative. Eyes: Negative. Respiratory:  Positive for shortness of breath. Cardiovascular: Negative. Gastrointestinal: Negative. Endocrine: Negative. Genitourinary: Negative. Musculoskeletal: Negative. Skin: Negative. Allergic/Immunologic: Negative. Neurological: Negative. Hematological: Negative. Psychiatric/Behavioral: Negative. Current Outpatient Medications:     blood glucose monitor strips, Test 4 times a day & as needed for symptoms of irregular blood glucose., Disp: 400 strip, Rfl: 0    hydroCHLOROthiazide (MICROZIDE) 12.5 MG capsule, Take 1 capsule by mouth daily, Disp: 90 capsule, Rfl: 5    gabapentin (NEURONTIN) 300 MG capsule, Take 1 capsule by mouth at bedtime. , Disp: 90 capsule, Rfl: 3    Semaglutide,0.25 or 0.5MG/DOS, 2 MG/1.5ML SOPN, Inject 0.25 mg into the skin once a week, Disp: 16 Adjustable Dose Pre-filled Pen Syringe, Rfl: 5    Handicap Placard MISC, by Does not apply route Dx  copd       5 yrs, Disp: 1 each, Rfl: 0    citalopram (CELEXA) 40 MG tablet, Take 1 tablet by mouth daily Instructed to take am of procedure, Disp: 90 tablet, Rfl: 5    insulin glargine (BASAGLAR KWIKPEN) 100 UNIT/ML injection pen, Inject 25 Units into the skin nightly, Disp: 5 pen, Rfl: 12    insulin aspart (NOVOLOG FLEXPEN) 100 UNIT/ML injection pen, Inject insulin 3 times daily before meals according to sliding scale with a max dose of 20 units, Disp: 10 pen, Rfl: 11    Insulin Pen Needle (PEN NEEDLES 5/16\") 30G X 8 MM MISC, 1 each by Does not apply route daily Insulin 4 times a day. May change to what ever type of needle is available, Disp: 100 each, Rfl: 3    Fluticasone-Umeclidin-Vilant (TRELEGY ELLIPTA) 200-62.5-25 MCG/INH AEPB, Inhale 1 puff into the lungs in the morning., Disp: 1 each, Rfl: 5    celecoxib (CELEBREX) 200 MG capsule, Take 1 capsule by mouth in the morning., Disp: 90 capsule, Rfl: 3    Lancets MISC, 1 each by Does not apply route 4 times daily, Disp: 400 each, Rfl: 5    valsartan (DIOVAN) 40 MG tablet, Take 1 tablet by mouth daily, Disp: 100 tablet, Rfl: 1    rosuvastatin (CRESTOR) 10 MG tablet, Take 1 tablet by mouth daily, Disp: 90 tablet, Rfl: 5    hydrocortisone 2.5 % cream, Apply topically 4  times daily. , Disp: 120 g, Rfl: 2    diclofenac sodium (VOLTAREN) 1 % GEL, Apply 4 g topically 4 times daily, Disp: 350 g, Rfl: 2    Insulin Pen Needle 31G X 8 MM MISC, Use to inject insulin 4 times daily, Disp: 400 each, Rfl: 3    albuterol sulfate HFA (PROAIR HFA) 108 (90 Base) MCG/ACT inhaler, INHALE TWO PUFFS BY MOUTH FOUR TIMES A DAY AS NEEDED, Disp: 25.5 g, Rfl: 12    metFORMIN (GLUCOPHAGE) 500 MG tablet, Take 2 tablets by mouth 2 times daily (with meals), Disp: 360 tablet, Rfl: 3    levothyroxine (SYNTHROID) 50 MCG tablet, Take 1 tablet by mouth Daily, Disp: 90 tablet, Rfl: 3    aspirin 81 MG EC tablet, Take 81 mg by mouth daily, Disp: , Rfl:     Blood Glucose Monitoring Suppl (BLOOD GLUCOSE MONITOR SYSTEM) w/Device KIT, One Touch Ultra monitor, Disp: 1 kit, Rfl: 0    fluticasone (FLONASE) 50 MCG/ACT nasal spray, 1 spray by Each Nostril route daily, Disp: 1 Bottle, Rfl: 12    TURMERIC PO, Take by mouth daily LD 2-14-20, Disp: , Rfl:     ipratropium-albuterol (DUONEB) 0.5-2.5 (3) MG/3ML SOLN nebulizer solution, Inhale 1 vial into the lungs every 4 hours as needed for Shortness of Breath Instructed to take am of procedure if needed, Disp: , Rfl:     loratadine (CLARITIN) 10 MG capsule, Take 10 mg by mouth daily , Disp: , Rfl:     Cholecalciferol (VITAMIN D3) 2000 units CAPS, Take 5,000 Units by mouth daily Ld 20, Disp: , Rfl:     Coenzyme Q10 (COQ-10) 10 MG CAPS, Take by mouth daily Ld 20, Disp: , Rfl:   Allergies   Allergen Reactions    Macrobid [Nitrofurantoin Macrocrystal] Other (See Comments)     Exacerbates asthma     Social History     Socioeconomic History    Marital status:      Spouse name: Not on file    Number of children: Not on file    Years of education: Not on file    Highest education level: Not on file   Occupational History    Not on file   Tobacco Use    Smoking status: Former     Packs/day: 1.00     Years: 30.00     Pack years: 30.00     Types: Cigarettes     Quit date: 2017     Years since quittin.4    Smokeless tobacco: Never   Vaping Use    Vaping Use: Former    Start date: 2015    Quit date: 8/10/2015    Substances: Nicotine    Devices: Refillable tank   Substance and Sexual Activity    Alcohol use: Yes     Comment: rare    Drug use: Never    Sexual activity: Not Currently   Other Topics Concern    Not on file   Social History Narrative        OBESITY    SMOKER----QUIT ------BACK------ QUIT     FH CAD    LEFT SDIED [address] Yamileth Sanchez     OA KNEES    EDEMA LEGS    FH DM    LEUKOCYTOSIS--DR BARGER IN PAST--THEN DR BOWMAN ----------------------------dr dawna to    DEPRESSION    NIDDM    HTN    ELEV CRP    EARLY HYPOTHYROID    ASTHMA    SLEEP APNEA------C-PAP------pt quit using    D AND C ----DR PALMA    TORN MENISCUS L KNEE OR  DR DIEHL------SEVERE OA L KNEE ON X RAY     EMG  SEVERE CTS L MOD-SEV R---REFER TO DR STEPHENSON    INJECTIONS KNEE DR GTZ    LEFT TOTAL KNEE OR 2-15 DR GTZ--    R CARPAL TUNNEL SURGEYR 3-15 DR GTZ    VITROUS SEPARATION LEFT EYE 11-15    R SHOULDER OR TORN ROTATOR CUFF 6-16 DR SURESH---PT NOT HAPPY    PT MOVED BACK HOME 2-17    VENTRAL HERNIA OR DR Alice Vieira 9-17    EVAL DR MARROQUIN 8-18 COPD ADDED O2--------------------dr Chapin    RETIRED 9-18 DISABILITY 1-19    Left carpal tunnel  7-19 surgery    Right total shoulder arthroplasty   Dr Elliot Anders ----------------------------------------------------------dr Anders Ramirez     Social Determinants of Health     Financial Resource Strain: Not on file   Food Insecurity: Not on file   Transportation Needs: Not on file   Physical Activity: Not on file   Stress: Not on file   Social Connections: Not on file   Intimate Partner Violence: Not on file   Housing Stability: Not on file      Past Medical History:   Diagnosis Date    Anxiety     Arthritis     Asthma     COPD (chronic obstructive pulmonary disease) ([facility]     uses O2 NC 2 L continuous     Depression     Diabetes mellitus ([facility]     Edema leg     GERD (gastroesophageal reflux disease)     Hypertension     Hypothyroidism     Leukocytosis     Obesity     Osteoarthritis     Sleep apnea     no CPAP     Thyroid disease     Type 2 diabetes mellitus without complication ([facility] [facility]      Family History   Problem Relation Age of Onset    Stroke Mother     Diabetes Type 1  Mother     Heart Attack Father 79    Alcohol Abuse Father     Breast Cancer Sister     Cancer Sister     No Known Problems Brother     High Blood Pressure Sister       Past Surgical History:   Procedure Laterality Date    CARPAL TUNNEL RELEASE Left 7/1/2019    LEFT CARPAL TUNNEL RELEASE performed by Ricarda Light MD at [address] [place] [address] Drive      times 3    DILATION AND CURETTAGE OF UTERUS      HERNIA REPAIR      ROTATOR CUFF REPAIR Right     SHOULDER SURGERY Right 2/21/2020    RIGHT SHOULDER TOTAL ARTHROPLASTY REVERSE WITH ISB [facility]     TUBAL LIGATION        Vitals:    10/22/22 1003   BP: 135/82   Pulse: 87   Temp: 97.4 °F (36.3 °C)   SpO2: 96%   Weight: 238 lb (108 kg)   Height: 4' 10\" (1.473 m)       Objective:    Physical Exam  Vitals reviewed. Constitutional:       Appearance: Normal appearance. She is well-developed. She is obese. HENT:      Head: Normocephalic. Right Ear: Tympanic membrane normal.      Left Ear: Tympanic membrane normal.      Nose: Nose normal.      Mouth/Throat:      Mouth: Mucous membranes are moist.   Eyes:      Pupils: Pupils are equal, round, and reactive to light. Cardiovascular:      Rate and Rhythm: Normal rate and regular rhythm. Pulmonary:      Effort: Pulmonary effort is normal.      Breath sounds: Normal breath sounds. Abdominal:      General: Bowel sounds are normal.      Palpations: Abdomen is soft. Musculoskeletal:         General: Normal range of motion. Cervical back: Normal range of motion. Skin:     General: Skin is warm. Neurological:      Mental Status: She is alert and oriented to person, place, and time. Psychiatric:         Behavior: Behavior normal.       Sandra Gonsales was seen today for copd. Diagnoses and all orders for this visit:    Type 2 diabetes mellitus with diabetic polyneuropathy, with long-term current use of insulin ([address]  -     CBC with Auto Differential; Future  -     Comprehensive Metabolic Panel; Future  -     Hemoglobin A1C; Future  -     Lipid Panel; Future  -     Urinalysis; Future  -     Vitamin D 25 Hydroxy; Future  -     Vitamin B12; Future  -     Uric Acid; Future  -     T4; Future  -     TSH; Future  -     Microalbumin, Ur; Future    COPD without exacerbation (HCC)    RBBB    Essential hypertension  -     hydroCHLOROthiazide (MICROZIDE) 12.5 MG capsule; Take 1 capsule by mouth daily  -     CBC with Auto Differential; Future  -     Comprehensive Metabolic Panel; Future  -     Hemoglobin A1C; Future  -     Lipid Panel; Future  -     Urinalysis; Future  -     Vitamin D 25 Hydroxy;  Future  - Vitamin B12; Future  -     Uric Acid; Future  -     T4; Future  -     TSH; Future  -     Microalbumin, Ur; Future    Primary osteoarthritis of both hands  -     External Referral To Orthopedic Surgery    Need for influenza vaccination  -     Influenza, FLUAD, (age 72 y+), IM, Preservative Free, 0.5 mL    Primary osteoarthritis of right shoulder    Acquired hypothyroidism  -     T4; Future  -     TSH; Future    Hyperuricemia  -     Uric Acid; Future    Vitamin B 12 deficiency  -     Vitamin B12; Future    Age-related osteoporosis without current pathological fracture  -     Vitamin D 25 Hydroxy; Future    Other orders  -     Handicap Placard MISC; by Does not apply route Dx  copd       5 yrs  -     blood glucose monitor strips; Test 4 times a day & as needed for symptoms of irregular blood glucose.  -     gabapentin (NEURONTIN) 300 MG capsule; Take 1 capsule by mouth at bedtime.  -     Semaglutide,0.25 or 0.5MG/DOS, 2 MG/1.5ML SOPN; Inject 0.25 mg into the skin once a week      Comments: [facility], handicap placard. Low-fat low sugar diet. Lose weight. Exercise. Ozempic and if not covered increase her long-acting insulin to 30 units. Check blood sugars 4 times a day. Increase gabapentin 300 nightly. I educated the patient about all medications. Make sure they were correct and educated  on the risk associated with missing meds or taking them incorrectly. A list of medications is being sent home with patient today. Check blood pressure at home twice a day. Low-salt low caffeine diet. Call if systolic blood pressure is above 119 and diastolic blood pressures above 85. Only use a upper arm digital cuff. Aggressive low-fat diet. Avoid red meats, greasy fried foods, dairy products. Avoid processed foods. Take cholesterol medications without food. Check blood sugars 4 times a day. Aggressive low, sugar low carbohydrate diet. Call if blood sugar less than 70 or over 200.   Avoid eating in between meals. Lose weight. Exercise. I informed patient about the risk associated with noncompliance of medication and taking medications incorrectly. Appropriate follow-up with myself and all specialist.  Encourage family members to take active role in assisting with medications and medical care. If any confusion should develop to notify my office immediately to avoid risk of worsening medical condition      ,A great deal of time spent reviewing medications, diet, exercise, social issues. Also reviewing the chart before entering the room with patient and finishing charting after leaving patient's room. More than half of that time was spent face to face with the patient in counseling and coordinating care. Follow Up: Return in about 4 months (around 2/22/2023) for Lab Before.      Seen by:  Gianluca Israel,

## 2022-10-24 VITALS
HEIGHT: 58 IN | HEART RATE: 87 BPM | WEIGHT: 238 LBS | BODY MASS INDEX: 49.96 KG/M2 | TEMPERATURE: 97.4 F | SYSTOLIC BLOOD PRESSURE: 135 MMHG | OXYGEN SATURATION: 96 % | DIASTOLIC BLOOD PRESSURE: 82 MMHG

## 2022-10-24 PROBLEM — M19.042 PRIMARY OSTEOARTHRITIS OF BOTH HANDS: Status: ACTIVE | Noted: 2022-10-24

## 2022-10-24 PROBLEM — M19.041 PRIMARY OSTEOARTHRITIS OF BOTH HANDS: Status: ACTIVE | Noted: 2022-10-24

## 2022-10-24 RX ORDER — HYDROCHLOROTHIAZIDE 12.5 MG/1
12.5 CAPSULE, GELATIN COATED ORAL DAILY
Qty: 90 CAPSULE | Refills: 5 | Status: SHIPPED | OUTPATIENT
Start: 2022-10-24

## 2022-10-24 RX ORDER — GABAPENTIN 300 MG/1
300 CAPSULE ORAL NIGHTLY
Qty: 90 CAPSULE | Refills: 3 | Status: SHIPPED | OUTPATIENT
Start: 2022-10-24 | End: 2023-10-24

## 2022-10-24 RX ORDER — GLUCOSAMINE HCL/CHONDROITIN SU 500-400 MG
CAPSULE ORAL
Qty: 400 STRIP | Refills: 0 | Status: SHIPPED | OUTPATIENT
Start: 2022-10-24

## 2022-10-24 ASSESSMENT — PATIENT HEALTH QUESTIONNAIRE - PHQ9
SUM OF ALL RESPONSES TO PHQ QUESTIONS 1-9: 0
SUM OF ALL RESPONSES TO PHQ QUESTIONS 1-9: 0
SUM OF ALL RESPONSES TO PHQ9 QUESTIONS 1 & 2: 0
2. FEELING DOWN, DEPRESSED OR HOPELESS: 0
SUM OF ALL RESPONSES TO PHQ QUESTIONS 1-9: 0
1. LITTLE INTEREST OR PLEASURE IN DOING THINGS: 0
SUM OF ALL RESPONSES TO PHQ QUESTIONS 1-9: 0

## 2022-10-24 ASSESSMENT — ENCOUNTER SYMPTOMS
GASTROINTESTINAL NEGATIVE: 1
ALLERGIC/IMMUNOLOGIC NEGATIVE: 1
EYES NEGATIVE: 1
SHORTNESS OF BREATH: 1

## 2022-10-31 ENCOUNTER — TELEPHONE (OUTPATIENT)
Dept: PRIMARY CARE CLINIC | Age: 67
End: 2022-10-31

## 2022-12-09 ENCOUNTER — TELEPHONE (OUTPATIENT)
Dept: PRIMARY CARE CLINIC | Age: 67
End: 2022-12-09

## 2022-12-09 RX ORDER — CEFDINIR 300 MG/1
300 CAPSULE ORAL 2 TIMES DAILY
Qty: 20 CAPSULE | Refills: 0 | Status: SHIPPED | OUTPATIENT
Start: 2022-12-09 | End: 2022-12-19

## 2022-12-09 NOTE — TELEPHONE ENCOUNTER
Tell patient I sent an antibiotic to the drugstore. Make sure to increase fluids and eat 3 meals a day. If no appetite encourage [place] instant breakfast 2 or 3 times a day. Check oxygen level with the finger meter frequently with ambulation if below is below 90 and stays or call if it goes below 85 go to the emergency room. Take zinc and vitamin C. If not better by noon tomorrow, to [facility] before noon.

## 2022-12-10 ENCOUNTER — OFFICE VISIT (OUTPATIENT)
Dept: FAMILY MEDICINE CLINIC | Age: 67
End: 2022-12-10

## 2022-12-10 VITALS
TEMPERATURE: 97.8 F | DIASTOLIC BLOOD PRESSURE: 84 MMHG | HEART RATE: 112 BPM | OXYGEN SATURATION: 97 % | SYSTOLIC BLOOD PRESSURE: 132 MMHG | HEIGHT: 58 IN | WEIGHT: 238 LBS | BODY MASS INDEX: 49.96 KG/M2

## 2022-12-10 DIAGNOSIS — J40 BRONCHITIS: ICD-10-CM

## 2022-12-10 DIAGNOSIS — R05.9 COUGH, UNSPECIFIED TYPE: Primary | ICD-10-CM

## 2022-12-10 LAB
Lab: NORMAL
PERFORMING INSTRUMENT: NORMAL
QC PASS/FAIL: NORMAL
SARS-COV-2, POC: NORMAL

## 2022-12-10 RX ORDER — BUDESONIDE 0.25 MG/2ML
250 INHALANT ORAL 2 TIMES DAILY
Qty: 60 EACH | Refills: 0 | Status: SHIPPED | OUTPATIENT
Start: 2022-12-10

## 2022-12-10 ASSESSMENT — ENCOUNTER SYMPTOMS
COUGH: 1
CHEST TIGHTNESS: 0
EYE PAIN: 0
ABDOMINAL PAIN: 0
VOMITING: 0
SHORTNESS OF BREATH: 1
SORE THROAT: 0
RHINORRHEA: 1
DIARRHEA: 0
TROUBLE SWALLOWING: 0
WHEEZING: 0
SINUS PRESSURE: 1
NAUSEA: 0
CONSTIPATION: 0
BACK PAIN: 0
SINUS PAIN: 0

## 2022-12-10 NOTE — PROGRESS NOTES
Chintan Amador (:  1955) is a 78 y.o. female,Established patient, here for evaluation of the following chief complaint(s):  Shortness of Breath (Was given abx yesterday by pcp, and told to come into [facility]), Cough, and Generalized Body Aches         ASSESSMENT/PLAN:  1. Cough, unspecified type  -     POCT COVID-19, Antigen  2. Bronchitis  Comments:  restart nebulizers four times a day with budesonide twice a day  f/u monday if not feeling better      Return if symptoms worsen or fail to improve. Subjective   SUBJECTIVE/OBJECTIVE:  Here for a cough  Some shortness of breath  Coughing  Her daughter has covid'  She did a test but is not sure if it was negative or positive  Rapid covid is negative here today  She has been sick for about a week  Has some chest congestion and drainage as well    No fevers  She started omnicef yesterday  Using her oxygen all the time but still feeling a little short of breath  Just started using nebulizer this morning        Review of Systems   Constitutional:  Positive for fatigue. Negative for appetite change and fever. HENT:  Positive for congestion, postnasal drip, rhinorrhea and sinus pressure. Negative for ear pain, sinus pain, sore throat and trouble swallowing. Eyes:  Negative for pain. Respiratory:  Positive for cough and shortness of breath. Negative for chest tightness and wheezing. Cardiovascular:  Negative for chest pain, palpitations and leg swelling. Gastrointestinal:  Negative for abdominal pain, constipation, diarrhea, nausea and vomiting. Endocrine: Negative for cold intolerance and heat intolerance. Genitourinary:  Negative for difficulty urinating, hematuria and pelvic pain. Musculoskeletal:  Negative for back pain, gait problem and joint swelling. Skin:  Negative for rash and wound. Neurological:  Negative for dizziness, syncope and headaches. Hematological:  Negative for adenopathy.    Psychiatric/Behavioral:  Negative for confusion, sleep disturbance and suicidal ideas. Objective   Physical Exam  Vitals and nursing note reviewed. Constitutional:       General: She is not in acute distress. Appearance: She is well-developed. She is ill-appearing. She is not toxic-appearing. HENT:      Head: Normocephalic and atraumatic. Right Ear: Tympanic membrane normal.      Left Ear: Tympanic membrane normal.      Nose: Congestion present. Mouth/Throat:      Mouth: Mucous membranes are moist.      Pharynx: No oropharyngeal exudate or posterior oropharyngeal erythema. Eyes:      Pupils: Pupils are equal, round, and reactive to light. Cardiovascular:      Rate and Rhythm: Normal rate and regular rhythm. Pulmonary:      Effort: Pulmonary effort is normal.      Comments: Breath sound decreased but clear  Musculoskeletal:      Cervical back: Normal range of motion. Skin:     General: Skin is warm and dry. Neurological:      Mental Status: She is alert. An electronic signature was used to authenticate this note.     --Donnell Flores, DO

## 2022-12-20 DIAGNOSIS — J43.9 PULMONARY EMPHYSEMA, UNSPECIFIED EMPHYSEMA TYPE (HCC): Chronic | ICD-10-CM

## 2022-12-20 DIAGNOSIS — E11.9 TYPE 2 DIABETES MELLITUS WITHOUT COMPLICATION, WITH LONG-TERM CURRENT USE OF INSULIN (HCC): Chronic | ICD-10-CM

## 2022-12-20 DIAGNOSIS — Z79.4 TYPE 2 DIABETES MELLITUS WITHOUT COMPLICATION, WITH LONG-TERM CURRENT USE OF INSULIN (HCC): Chronic | ICD-10-CM

## 2022-12-20 RX ORDER — FLUTICASONE PROPIONATE 50 MCG
1 SPRAY, SUSPENSION (ML) NASAL DAILY
Qty: 1 EACH | Refills: 12 | Status: SHIPPED | OUTPATIENT
Start: 2022-12-20

## 2023-01-05 ENCOUNTER — COMMUNITY OUTREACH (OUTPATIENT)
Dept: PRIMARY CARE CLINIC | Age: 68
End: 2023-01-05

## 2023-01-16 DIAGNOSIS — I10 ESSENTIAL HYPERTENSION: Chronic | ICD-10-CM

## 2023-01-18 DIAGNOSIS — I10 ESSENTIAL HYPERTENSION: Chronic | ICD-10-CM

## 2023-01-18 RX ORDER — VALSARTAN 40 MG/1
40 TABLET ORAL DAILY
Qty: 100 TABLET | Refills: 1 | Status: SHIPPED | OUTPATIENT
Start: 2023-01-18

## 2023-01-18 RX ORDER — VALSARTAN 40 MG/1
40 TABLET ORAL DAILY
Qty: 100 TABLET | Refills: 5 | Status: SHIPPED
Start: 2023-01-18 | End: 2023-01-18 | Stop reason: SDUPTHER

## 2023-01-18 RX ORDER — VALSARTAN 40 MG/1
40 TABLET ORAL DAILY
Qty: 90 TABLET | Refills: 3 | Status: SHIPPED | OUTPATIENT
Start: 2023-01-18

## 2023-01-24 ENCOUNTER — PROCEDURE VISIT (OUTPATIENT)
Dept: PODIATRY | Age: 68
End: 2023-01-24
Payer: MEDICARE

## 2023-01-24 VITALS
WEIGHT: 238 LBS | SYSTOLIC BLOOD PRESSURE: 158 MMHG | BODY MASS INDEX: 49.74 KG/M2 | DIASTOLIC BLOOD PRESSURE: 89 MMHG | TEMPERATURE: 97.1 F

## 2023-01-24 DIAGNOSIS — M79.674 PAIN IN TOE OF RIGHT FOOT: ICD-10-CM

## 2023-01-24 DIAGNOSIS — M20.11 HALLUX VALGUS OF RIGHT FOOT: ICD-10-CM

## 2023-01-24 DIAGNOSIS — B35.1 TINEA UNGUIUM: Primary | ICD-10-CM

## 2023-01-24 DIAGNOSIS — E11.9 ENCOUNTER FOR DIABETIC FOOT EXAM (HCC): ICD-10-CM

## 2023-01-24 DIAGNOSIS — M79.675 PAIN IN LEFT TOE(S): ICD-10-CM

## 2023-01-24 DIAGNOSIS — R26.2 DIFFICULTY WALKING: ICD-10-CM

## 2023-01-24 DIAGNOSIS — I73.9 PERIPHERAL VASCULAR DISEASE, UNSPECIFIED (HCC): ICD-10-CM

## 2023-01-24 DIAGNOSIS — E11.9 TYPE 2 DIABETES MELLITUS WITHOUT COMPLICATION, WITH LONG-TERM CURRENT USE OF INSULIN (HCC): ICD-10-CM

## 2023-01-24 DIAGNOSIS — Z79.4 TYPE 2 DIABETES MELLITUS WITHOUT COMPLICATION, WITH LONG-TERM CURRENT USE OF INSULIN (HCC): ICD-10-CM

## 2023-01-24 PROCEDURE — 11721 DEBRIDE NAIL 6 OR MORE: CPT | Performed by: PODIATRIST

## 2023-01-24 NOTE — PROGRESS NOTES
1 [facility] PODIATRY  [address] [facility] [address] [address] [address]  Dept: [phone]  Dept Fax: [phone]  Loc: [phone]    DIABETIC NAIL PROGRESS NOTE  Date of patient's visit: 1/24/2023  Patient's Name:  Kirstie Jenkins YOB: 1955            Patient Care Team:  Chica Townsend DO as PCP - General (Family Medicine)  Chica Townsend DO as PCP - [facility]  Cinthya Botello MD as Consulting Physician (Pulmonology)  John Abdalla MD as Consulting Physician (Pulmonology)  Allegra Staton MD as Consulting Physician (Cardiology)  Carolina Cadnelaria DPM as Consulting Physician (Podiatry)          Chief Complaint   Patient presents with    Toe Pain    Bunions     Saw pcp Dr. Bonnie Jurado 12/9/2022    Diabetes       Subjective:   Kirstie Jenkins comes to clinic for Toe Pain, Bunions (Saw pcp Dr. Bonnie Jurado 12/9/2022), and Diabetes    she is a diabetic and states that diabetic foot exam nail care and a painful right bunion. Patient relates that the bunion has been bothering her off and on for many years progressively getting worse keeping her up at night. No trauma noted. .  Pt currently has complaint of thickened, elongated nails that they cannot manage by themselves. Pt's primary care physician is Chica Townsend DO    . Lab Results   Component Value Date    LABA1C 7.4 (H) 10/20/2022      Complains of numbness in the feet bilat.   Past Medical History:   Diagnosis Date    Anxiety     Arthritis     Asthma     COPD (chronic obstructive pulmonary disease) ([facility] [address]     uses O2 NC 2 L continuous     Depression     Diabetes mellitus (HCC)     Edema leg     GERD (gastroesophageal reflux disease)     Hypertension     Hypothyroidism     Leukocytosis     Obesity     Osteoarthritis     Sleep apnea     no CPAP     Thyroid disease     Type 2 diabetes mellitus without complication (HCC)        Allergies   Allergen Reactions Macrobid [Nitrofurantoin Macrocrystal] Other (See Comments)     Exacerbates asthma     Current Outpatient Medications on File Prior to Visit   Medication Sig Dispense Refill    valsartan (DIOVAN) 40 MG tablet Take 1 tablet by mouth daily 100 tablet 1    valsartan (DIOVAN) 40 MG tablet Take 1 tablet by mouth daily 90 tablet 3    metFORMIN (GLUCOPHAGE) 500 MG tablet Take 2 tablets by mouth 2 times daily (with meals) 360 tablet 3    fluticasone (FLONASE) 50 MCG/ACT nasal spray 1 spray by Each Nostril route daily 1 each 12    budesonide (PULMICORT) 0.25 MG/2ML nebulizer suspension Take 2 mLs by nebulization 2 times daily 60 each 0    blood glucose monitor strips Test 4 times a day & as needed for symptoms of irregular blood glucose. 400 strip 0    hydroCHLOROthiazide (MICROZIDE) 12.5 MG capsule Take 1 capsule by mouth daily 90 capsule 5    gabapentin (NEURONTIN) 300 MG capsule Take 1 capsule by mouth at bedtime. 90 capsule 3    Semaglutide,0.25 or 0.5MG/DOS, 2 MG/1.5ML SOPN Inject 0.25 mg into the skin once a week 16 Adjustable Dose Pre-filled Pen Syringe 5    Handicap Placard MISC by Does not apply route Dx  copd       5 yrs 1 each 0    citalopram (CELEXA) 40 MG tablet Take 1 tablet by mouth daily Instructed to take am of procedure 90 tablet 5    insulin glargine (BASAGLAR KWIKPEN) 100 UNIT/ML injection pen Inject 25 Units into the skin nightly 5 pen 12    insulin aspart (NOVOLOG FLEXPEN) 100 UNIT/ML injection pen Inject insulin 3 times daily before meals according to sliding scale with a max dose of 20 units 10 pen 11    Insulin Pen Needle (PEN NEEDLES 5/16\") 30G X 8 MM MISC 1 each by Does not apply route daily Insulin 4 times a day. May change to what ever type of needle is available 100 each 3    Fluticasone-Umeclidin-Vilant (TRELEGY ELLIPTA) 200-62.5-25 MCG/INH AEPB Inhale 1 puff into the lungs in the morning. 1 each 5    celecoxib (CELEBREX) 200 MG capsule Take 1 capsule by mouth in the morning.  90 capsule 3 Lancets MISC 1 each by Does not apply route 4 times daily 400 each 5    rosuvastatin (CRESTOR) 10 MG tablet Take 1 tablet by mouth daily 90 tablet 5    hydrocortisone 2.5 % cream Apply topically 4  times daily. 120 g 2    diclofenac sodium (VOLTAREN) 1 % GEL Apply 4 g topically 4 times daily 350 g 2    Insulin Pen Needle 31G X 8 MM MISC Use to inject insulin 4 times daily 400 each 3    albuterol sulfate HFA (PROAIR HFA) 108 (90 Base) MCG/ACT inhaler INHALE TWO PUFFS BY MOUTH FOUR TIMES A DAY AS NEEDED 25.5 g 12    levothyroxine (SYNTHROID) 50 MCG tablet Take 1 tablet by mouth Daily 90 tablet 3    aspirin 81 MG EC tablet Take 81 mg by mouth daily      Blood Glucose Monitoring Suppl (BLOOD GLUCOSE MONITOR SYSTEM) w/Device KIT One Touch Ultra monitor 1 kit 0    TURMERIC PO Take by mouth daily LD 2-14-20      ipratropium-albuterol (DUONEB) 0.5-2.5 (3) MG/3ML SOLN nebulizer solution Inhale 1 vial into the lungs every 4 hours as needed for Shortness of Breath Instructed to take am of procedure if needed      loratadine (CLARITIN) 10 MG capsule Take 10 mg by mouth daily       Cholecalciferol (VITAMIN D3) 2000 units CAPS Take 5,000 Units by mouth daily Ld 2-14-20      Coenzyme Q10 (COQ-10) 10 MG CAPS Take by mouth daily Ld 2-14-20       No current facility-administered medications on file prior to visit. Review of Systems    Review of Systems:   History obtained from chart review and the patient  General ROS: negative for - chills, fatigue, fever, night sweats or weight gain  Constitutional: Negative for chills, diaphoresis, fatigue, fever and unexpected weight change. Musculoskeletal: Positive for arthralgias, gait problem and joint swelling. Neurological ROS: negative for - behavioral changes, confusion, headaches or seizures. Negative for weakness and numbness. Dermatological ROS: negative for - mole changes, rash  Cardiovascular: Negative for leg swelling.    Gastrointestinal: Negative for constipation, diarrhea, nausea and vomiting. Objective:  General: AAO x 3 in NAD. Derm  Toenail Description nails are thick and mycotic yellow incurvated causing pain with shoe gear  Sites of Onychomycosis Involvement (Check affected area)  [x] [x] [x] [x] [x] [x] [x] [x] [x] [x]  5 4 3 2 1 1 2 3 4 5                          Right                                        Left    Thickness  [x] [x] [x] [x] [x] [x] [x] [x] [x] [x]  5 4 3 2 1 1 2 3 4 5                         Right                                        Left    Dystrophic Changes   [x] [x] [x] [x] [x] [x] [x] [x] [x] [x]  5 4 3 2 1 1 2 3 4 5                         Right                                        Left    Color   [x] [x] [x] [x] [x] [x] [x] [x] [x] [x]  5 4 3 2 1 1 2 3 4 5                          Right                                        Left    Incurvation/Ingrowin   [x] [x] [x] [x] [x] [x] [x] [x] [x] [x]  5 4 3 2 1 1 2 3 4 5                         Right                                        Left    Inflammation/Pain   [x] [x] [x] [x] [x] [x] [x] [x] [x] [x]  5 4 3 2 1 1 2 3 4 5                         Right                                        Left      Dermatologic Exam:  Skin lesion/ulceration negative.    Skin skin has no ulcer  Loss of hair  lower extremity      Musculoskeletal:   Hallux abductovalgus noted first digit right foot  1st MPJ ROM decreased, Bilateral.  Muscle Bilateral.  Pain present upon palpation of toenails 1 through 5 bilaterally extremely long thick pitting mycotic incurvated again skin, Bilateral. decreased medial longitudinal arch, Bilateral.  Ankle ROM decreased,Bilateral.    Dorsally contracted digits negative    Vascular: DP and PT pulses pulses are DP palpable PT absent CFT <3 seconds, Bilateral.  Hair growth absent to the level of the digits, Bilateral.  Edema negative bilateral.  Varicosities negative bilateral.     Neurological: Sensation diminshed to light touch to level of digits, Bilateral.  Protective sensation intact sites via 5.07/10g [place] Monofilament, Bilateral.  negative Tinel's, Bilateral.  negative Valleix sign, Bilateral.      Integument: nails   thickened > 3.0 mm, dystrophic and crumbly, discolored with subungual debris. Toes cool to touch    Visual inspection:  Deformity: hammertoe deformity becca feet  amputation: absent    Edema:   Sensory exam:  Monofilament sensation:- 6/10 via SW 5.07/10g monofilament to the plantar foot bilateral feet    X-ray of the right foot shows severe hallux abductovalgus with collapsed first metatarsophalangeal joint joint. DM with PVD       [x]Yes    []no    Foot Exam     Ortho Exam      Assessment:  [age] y.o. female with:  1. Tinea unguium    2. Type 2 diabetes mellitus without complication, with long-term current use of insulin (HCC)    3. Hallux valgus of right foot    4. Pain in left toe(s)    5. Pain in toe of right foot    6. Peripheral vascular disease, unspecified ([facility] [address]    7. Difficulty walking    8. Encounter for diabetic foot exam ([facility]         No orders of the defined types were placed in this encounter. Q7   []Yes    []No                Q8   [x]Yes    []No                     Q9   []Yes    []No    Plan: Pt was evaluated and examined. Patient was given personalized discharge instructions. Nails 1-10 were debrided sharply in length and thickness with a nipper and , without incident. Pt will follow up in 3 months or sooner if any problems arise. Diagnosis was discussed with the pt and all of their questions were answered in detail. Proper foot hygiene and care was discussed with the pt. Informed patient on proper diabetic foot care and importance of tight glycemic control. Patient to check feet daily and contact the office with any questions/problems/concerns. Other comorbidity noted and will be managed by PCP. @ED   It was discussed in detail with the patient proper hygiene for the diabetic foot.   They are to get into a habit of looking at their feet or have someone look at them. If they are unable to daily, they are to look for any signs of redness, blistering, cracking, swelling, drainage, open lesions, etc.  They are to dry in-between the toes after each bath or shower gently. The water should be tested with their elbow to prevent burns. The patient is to refrain from soaking their feet unless instructed by myself to do otherwise. They are to refrain from going barefoot. Shoe gear should be inspected for any foreign objects. Shoes should have a deep, wide toe box area. With any type of shoe, the feet should be inspected for any signs of pressure, i.e., redness, blistering, or open lesions. The patient was instructed to contact myself or other health care provider with any questions.    Electronically signed by Dannie Coulter DPM on 1/24/2023 at 2:11 PM  1/24/2023

## 2023-02-06 DIAGNOSIS — E03.9 ACQUIRED HYPOTHYROIDISM: ICD-10-CM

## 2023-02-07 RX ORDER — LEVOTHYROXINE SODIUM 0.05 MG/1
50 TABLET ORAL DAILY
Qty: 90 TABLET | Refills: 3 | Status: SHIPPED | OUTPATIENT
Start: 2023-02-07

## 2023-03-20 DIAGNOSIS — E53.8 VITAMIN B 12 DEFICIENCY: ICD-10-CM

## 2023-03-20 DIAGNOSIS — E03.9 ACQUIRED HYPOTHYROIDISM: ICD-10-CM

## 2023-03-20 DIAGNOSIS — M81.0 AGE-RELATED OSTEOPOROSIS WITHOUT CURRENT PATHOLOGICAL FRACTURE: ICD-10-CM

## 2023-03-20 DIAGNOSIS — I10 ESSENTIAL HYPERTENSION: Chronic | ICD-10-CM

## 2023-03-20 DIAGNOSIS — Z79.4 TYPE 2 DIABETES MELLITUS WITH DIABETIC POLYNEUROPATHY, WITH LONG-TERM CURRENT USE OF INSULIN (HCC): ICD-10-CM

## 2023-03-20 DIAGNOSIS — E79.0 HYPERURICEMIA: ICD-10-CM

## 2023-03-20 DIAGNOSIS — E11.42 TYPE 2 DIABETES MELLITUS WITH DIABETIC POLYNEUROPATHY, WITH LONG-TERM CURRENT USE OF INSULIN (HCC): ICD-10-CM

## 2023-03-20 LAB
ALBUMIN SERPL-MCNC: 3.9 G/DL (ref 3.5–5.2)
ALP SERPL-CCNC: 76 U/L (ref 35–104)
ALT SERPL-CCNC: 14 U/L (ref 0–32)
ANION GAP SERPL CALCULATED.3IONS-SCNC: 16 MMOL/L (ref 7–16)
AST SERPL-CCNC: 19 U/L (ref 0–31)
BASOPHILS # BLD: 0.11 E9/L (ref 0–0.2)
BASOPHILS NFR BLD: 1 % (ref 0–2)
BILIRUB SERPL-MCNC: 0.2 MG/DL (ref 0–1.2)
BUN SERPL-MCNC: 16 MG/DL (ref 6–23)
CALCIUM SERPL-MCNC: 9.3 MG/DL (ref 8.6–10.2)
CHLORIDE SERPL-SCNC: 100 MMOL/L (ref 98–107)
CHOLESTEROL, TOTAL: 107 MG/DL (ref 0–199)
CO2 SERPL-SCNC: 25 MMOL/L (ref 22–29)
CREAT SERPL-MCNC: 0.9 MG/DL (ref 0.5–1)
EOSINOPHIL # BLD: 0.35 E9/L (ref 0.05–0.5)
EOSINOPHIL NFR BLD: 3.2 % (ref 0–6)
ERYTHROCYTE [DISTWIDTH] IN BLOOD BY AUTOMATED COUNT: 15 FL (ref 11.5–15)
GLUCOSE SERPL-MCNC: 123 MG/DL (ref 74–99)
HBA1C MFR BLD: 6.2 % (ref 4–5.6)
HCT VFR BLD AUTO: 40 % (ref 34–48)
HDLC SERPL-MCNC: 51 MG/DL
HGB BLD-MCNC: 12.4 G/DL (ref 11.5–15.5)
IMM GRANULOCYTES # BLD: 0.08 E9/L
IMM GRANULOCYTES NFR BLD: 0.7 % (ref 0–5)
LDLC SERPL CALC-MCNC: 24 MG/DL (ref 0–99)
LYMPHOCYTES # BLD: 2.9 E9/L (ref 1.5–4)
LYMPHOCYTES NFR BLD: 26.3 % (ref 20–42)
MCH RBC QN AUTO: 25.4 PG (ref 26–35)
MCHC RBC AUTO-ENTMCNC: 31 % (ref 32–34.5)
MCV RBC AUTO: 81.8 FL (ref 80–99.9)
MONOCYTES # BLD: 0.79 E9/L (ref 0.1–0.95)
MONOCYTES NFR BLD: 7.2 % (ref 2–12)
NEUTROPHILS # BLD: 6.81 E9/L (ref 1.8–7.3)
NEUTS SEG NFR BLD: 61.6 % (ref 43–80)
PLATELET # BLD AUTO: 283 E9/L (ref 130–450)
PMV BLD AUTO: 12.3 FL (ref 7–12)
POTASSIUM SERPL-SCNC: 4.3 MMOL/L (ref 3.5–5)
PROT SERPL-MCNC: 7 G/DL (ref 6.4–8.3)
RBC # BLD AUTO: 4.89 E12/L (ref 3.5–5.5)
SODIUM SERPL-SCNC: 141 MMOL/L (ref 132–146)
T4 SERPL-MCNC: 8.1 MCG/DL (ref 4.5–11.7)
TRIGL SERPL-MCNC: 159 MG/DL (ref 0–149)
TSH SERPL-MCNC: 4.01 UIU/ML (ref 0.27–4.2)
URATE SERPL-MCNC: 4.8 MG/DL (ref 2.4–5.7)
VIT B12 SERPL-MCNC: 988 PG/ML (ref 211–946)
VITAMIN D 25-HYDROXY: 68 NG/ML (ref 30–100)
VLDLC SERPL CALC-MCNC: 32 MG/DL
WBC # BLD: 11 E9/L (ref 4.5–11.5)

## 2023-03-22 ENCOUNTER — OFFICE VISIT (OUTPATIENT)
Dept: PRIMARY CARE CLINIC | Age: 68
End: 2023-03-22

## 2023-03-22 VITALS — BODY MASS INDEX: 48.83 KG/M2 | TEMPERATURE: 98.7 F | HEIGHT: 58 IN | WEIGHT: 232.6 LBS

## 2023-03-22 DIAGNOSIS — G47.33 SLEEP APNEA, OBSTRUCTIVE: ICD-10-CM

## 2023-03-22 DIAGNOSIS — M81.0 AGE-RELATED OSTEOPOROSIS WITHOUT CURRENT PATHOLOGICAL FRACTURE: ICD-10-CM

## 2023-03-22 DIAGNOSIS — G89.29 CHRONIC BILATERAL LOW BACK PAIN WITHOUT SCIATICA: ICD-10-CM

## 2023-03-22 DIAGNOSIS — E03.9 ACQUIRED HYPOTHYROIDISM: ICD-10-CM

## 2023-03-22 DIAGNOSIS — E11.9 TYPE 2 DIABETES MELLITUS WITHOUT COMPLICATION, WITH LONG-TERM CURRENT USE OF INSULIN (HCC): ICD-10-CM

## 2023-03-22 DIAGNOSIS — Z79.4 TYPE 2 DIABETES MELLITUS WITHOUT COMPLICATION, WITH LONG-TERM CURRENT USE OF INSULIN (HCC): ICD-10-CM

## 2023-03-22 DIAGNOSIS — J44.9 COPD WITHOUT EXACERBATION (HCC): Chronic | ICD-10-CM

## 2023-03-22 DIAGNOSIS — E53.8 VITAMIN B 12 DEFICIENCY: ICD-10-CM

## 2023-03-22 DIAGNOSIS — Z00.00 MEDICARE ANNUAL WELLNESS VISIT, SUBSEQUENT: Primary | ICD-10-CM

## 2023-03-22 DIAGNOSIS — E11.42 TYPE 2 DIABETES MELLITUS WITH DIABETIC POLYNEUROPATHY, WITH LONG-TERM CURRENT USE OF INSULIN (HCC): ICD-10-CM

## 2023-03-22 DIAGNOSIS — I10 ESSENTIAL HYPERTENSION: Chronic | ICD-10-CM

## 2023-03-22 DIAGNOSIS — E78.2 MIXED HYPERLIPIDEMIA: ICD-10-CM

## 2023-03-22 DIAGNOSIS — N18.31 STAGE 3A CHRONIC KIDNEY DISEASE (HCC): ICD-10-CM

## 2023-03-22 DIAGNOSIS — M54.50 CHRONIC BILATERAL LOW BACK PAIN WITHOUT SCIATICA: ICD-10-CM

## 2023-03-22 DIAGNOSIS — Z79.4 TYPE 2 DIABETES MELLITUS WITH DIABETIC POLYNEUROPATHY, WITH LONG-TERM CURRENT USE OF INSULIN (HCC): ICD-10-CM

## 2023-03-22 RX ORDER — INSULIN GLARGINE 100 [IU]/ML
25 INJECTION, SOLUTION SUBCUTANEOUS NIGHTLY
Qty: 16 ADJUSTABLE DOSE PRE-FILLED PEN SYRINGE | Refills: 5 | Status: SHIPPED | OUTPATIENT
Start: 2023-03-22

## 2023-03-22 SDOH — HEALTH STABILITY: PHYSICAL HEALTH
ON AVERAGE, HOW MANY DAYS PER WEEK DO YOU ENGAGE IN MODERATE TO STRENUOUS EXERCISE (LIKE A BRISK WALK)?: PATIENT DECLINED

## 2023-03-22 SDOH — ECONOMIC STABILITY: INCOME INSECURITY: HOW HARD IS IT FOR YOU TO PAY FOR THE VERY BASICS LIKE FOOD, HOUSING, MEDICAL CARE, AND HEATING?: NOT VERY HARD

## 2023-03-22 SDOH — ECONOMIC STABILITY: TRANSPORTATION INSECURITY
IN THE PAST 12 MONTHS, HAS LACK OF TRANSPORTATION KEPT YOU FROM MEETINGS, WORK, OR FROM GETTING THINGS NEEDED FOR DAILY LIVING?: NO

## 2023-03-22 SDOH — ECONOMIC STABILITY: HOUSING INSECURITY
IN THE LAST 12 MONTHS, WAS THERE A TIME WHEN YOU DID NOT HAVE A STEADY PLACE TO SLEEP OR SLEPT IN A SHELTER (INCLUDING NOW)?: NO

## 2023-03-22 SDOH — ECONOMIC STABILITY: FOOD INSECURITY: WITHIN THE PAST 12 MONTHS, YOU WORRIED THAT YOUR FOOD WOULD RUN OUT BEFORE YOU GOT MONEY TO BUY MORE.: SOMETIMES TRUE

## 2023-03-22 SDOH — ECONOMIC STABILITY: FOOD INSECURITY: WITHIN THE PAST 12 MONTHS, THE FOOD YOU BOUGHT JUST DIDN'T LAST AND YOU DIDN'T HAVE MONEY TO GET MORE.: PATIENT DECLINED

## 2023-03-22 ASSESSMENT — LIFESTYLE VARIABLES
HOW MANY STANDARD DRINKS CONTAINING ALCOHOL DO YOU HAVE ON A TYPICAL DAY: PATIENT DOES NOT DRINK
HOW MANY STANDARD DRINKS CONTAINING ALCOHOL DO YOU HAVE ON A TYPICAL DAY: 0
HOW OFTEN DO YOU HAVE SIX OR MORE DRINKS ON ONE OCCASION: 1
HOW OFTEN DO YOU HAVE A DRINK CONTAINING ALCOHOL: NEVER
HOW OFTEN DO YOU HAVE A DRINK CONTAINING ALCOHOL: 1

## 2023-03-22 ASSESSMENT — PATIENT HEALTH QUESTIONNAIRE - PHQ9
SUM OF ALL RESPONSES TO PHQ QUESTIONS 1-9: 1
1. LITTLE INTEREST OR PLEASURE IN DOING THINGS: 0
SUM OF ALL RESPONSES TO PHQ9 QUESTIONS 1 & 2: 1
2. FEELING DOWN, DEPRESSED OR HOPELESS: 1
SUM OF ALL RESPONSES TO PHQ QUESTIONS 1-9: 1

## 2023-03-22 NOTE — PATIENT INSTRUCTIONS
against both UVA and UVB radiation with an SPF of 30 or greater. Reapply every 2 to 3 hours or after sweating, drying off with a towel, or swimming. Always wear a seat belt when traveling in a car. Always wear a helmet when riding a bicycle or motorcycle.

## 2023-03-22 NOTE — PROGRESS NOTES
spine  Neurologic: reflexes normal and symmetric, no cranial nerve deficit, gait, coordination and speech normal       Allergies   Allergen Reactions    Macrobid [Nitrofurantoin Macrocrystal] Other (See Comments)     Exacerbates asthma     Prior to Visit Medications    Medication Sig Taking? Authorizing Provider   insulin glargine (BASAGLAR KWIKPEN) 100 UNIT/ML injection pen Inject 25 Units into the skin nightly Yes Adi Sesay DO   levothyroxine (SYNTHROID) 50 MCG tablet Take 1 tablet by mouth Daily  Adi Sesay DO   valsartan (DIOVAN) 40 MG tablet Take 1 tablet by mouth daily  Adi Sesay DO   valsartan (DIOVAN) 40 MG tablet Take 1 tablet by mouth daily  Adi Sesay DO   metFORMIN (GLUCOPHAGE) 500 MG tablet Take 2 tablets by mouth 2 times daily (with meals)  Adi Sesay DO   fluticasone (FLONASE) 50 MCG/ACT nasal spray 1 spray by Each Nostril route daily  Adi Sesay DO   budesonide (PULMICORT) 0.25 MG/2ML nebulizer suspension Take 2 mLs by nebulization 2 times daily  Chidi Godinez DO   blood glucose monitor strips Test 4 times a day & as needed for symptoms of irregular blood glucose. Adi Sesay DO   hydroCHLOROthiazide (MICROZIDE) 12.5 MG capsule Take 1 capsule by mouth daily  Adi Sesay DO   gabapentin (NEURONTIN) 300 MG capsule Take 1 capsule by mouth at bedtime.   Adi Sesay DO   Semaglutide,0.25 or 0.5MG/DOS, 2 MG/1.5ML SOPN Inject 0.25 mg into the skin once a week  Adi Sesay DO   Handicap Placard MISC by Does not apply route Dx  copd       5 yrs  Adi Sesay DO   citalopram (CELEXA) 40 MG tablet Take 1 tablet by mouth daily Instructed to take am of procedure  Adi Sesay DO   insulin aspart (NOVOLOG FLEXPEN) 100 UNIT/ML injection pen Inject insulin 3 times daily before meals according to sliding scale with a max dose of 20 units  Adi Sesay DO   Insulin Pen Needle (PEN NEEDLES 5/16\") 30G X 8 MM MISC 1 each by Does not

## 2023-04-05 ENCOUNTER — TELEPHONE (OUTPATIENT)
Dept: PRIMARY CARE CLINIC | Age: 68
End: 2023-04-05

## 2023-04-05 DIAGNOSIS — G89.29 CHRONIC BILATERAL LOW BACK PAIN WITHOUT SCIATICA: Primary | ICD-10-CM

## 2023-04-05 DIAGNOSIS — M54.50 CHRONIC BILATERAL LOW BACK PAIN WITHOUT SCIATICA: Primary | ICD-10-CM

## 2023-04-05 NOTE — TELEPHONE ENCOUNTER
Patient calling Dr. Sakina Sahni is not in network with insurance asking if you can refer to someone within [facility].

## 2023-04-06 NOTE — TELEPHONE ENCOUNTER
The pt is calling again to see if she can get a referral to see Dr Dannie Deleon at [facility]

## 2023-04-17 DIAGNOSIS — Z79.4 TYPE 2 DIABETES MELLITUS WITHOUT COMPLICATION, WITH LONG-TERM CURRENT USE OF INSULIN (HCC): ICD-10-CM

## 2023-04-17 DIAGNOSIS — E11.9 TYPE 2 DIABETES MELLITUS WITHOUT COMPLICATION, WITH LONG-TERM CURRENT USE OF INSULIN (HCC): ICD-10-CM

## 2023-04-18 RX ORDER — PEN NEEDLE, DIABETIC 30 GX5/16"
1 NEEDLE, DISPOSABLE MISCELLANEOUS DAILY
Qty: 100 EACH | Refills: 3 | Status: SHIPPED | OUTPATIENT
Start: 2023-04-18

## 2023-04-20 ENCOUNTER — OFFICE VISIT (OUTPATIENT)
Dept: PRIMARY CARE CLINIC | Age: 68
End: 2023-04-20

## 2023-04-20 VITALS
TEMPERATURE: 97.3 F | HEART RATE: 102 BPM | BODY MASS INDEX: 48.61 KG/M2 | HEIGHT: 58 IN | OXYGEN SATURATION: 95 % | DIASTOLIC BLOOD PRESSURE: 83 MMHG | SYSTOLIC BLOOD PRESSURE: 138 MMHG

## 2023-04-20 DIAGNOSIS — J44.9 COPD WITHOUT EXACERBATION (HCC): Chronic | ICD-10-CM

## 2023-04-20 DIAGNOSIS — J20.8 ACUTE BRONCHITIS DUE TO OTHER SPECIFIED ORGANISMS: Primary | ICD-10-CM

## 2023-04-20 DIAGNOSIS — J01.80 ACUTE NON-RECURRENT SINUSITIS OF OTHER SINUS: ICD-10-CM

## 2023-04-20 RX ORDER — METHYLPREDNISOLONE ACETATE 40 MG/ML
10 INJECTION, SUSPENSION INTRA-ARTICULAR; INTRALESIONAL; INTRAMUSCULAR; SOFT TISSUE ONCE
Status: COMPLETED | OUTPATIENT
Start: 2023-04-20 | End: 2023-04-20

## 2023-04-20 RX ORDER — DEXTROMETHORPHAN HYDROBROMIDE AND PROMETHAZINE HYDROCHLORIDE 15; 6.25 MG/5ML; MG/5ML
5 SYRUP ORAL 4 TIMES DAILY PRN
Qty: 120 ML | Refills: 0 | Status: SHIPPED | OUTPATIENT
Start: 2023-04-20 | End: 2023-04-27

## 2023-04-20 RX ORDER — CEFTRIAXONE 500 MG/1
500 INJECTION, POWDER, FOR SOLUTION INTRAMUSCULAR; INTRAVENOUS ONCE
Status: COMPLETED | OUTPATIENT
Start: 2023-04-20 | End: 2023-04-20

## 2023-04-20 RX ORDER — LIDOCAINE HYDROCHLORIDE 10 MG/ML
1 INJECTION, SOLUTION INFILTRATION; PERINEURAL ONCE
Status: COMPLETED | OUTPATIENT
Start: 2023-04-20 | End: 2023-04-20

## 2023-04-20 RX ORDER — CEFDINIR 300 MG/1
300 CAPSULE ORAL 2 TIMES DAILY
Qty: 20 CAPSULE | Refills: 0 | Status: SHIPPED | OUTPATIENT
Start: 2023-04-20 | End: 2023-04-30

## 2023-04-20 RX ADMIN — LIDOCAINE HYDROCHLORIDE 1 ML: 10 INJECTION, SOLUTION INFILTRATION; PERINEURAL at 11:10

## 2023-04-20 RX ADMIN — CEFTRIAXONE 500 MG: 500 INJECTION, POWDER, FOR SOLUTION INTRAMUSCULAR; INTRAVENOUS at 11:09

## 2023-04-20 RX ADMIN — METHYLPREDNISOLONE ACETATE 10 MG: 40 INJECTION, SUSPENSION INTRA-ARTICULAR; INTRALESIONAL; INTRAMUSCULAR; SOFT TISSUE at 11:11

## 2023-04-20 ASSESSMENT — ENCOUNTER SYMPTOMS
EYES NEGATIVE: 1
SINUS PRESSURE: 1
GASTROINTESTINAL NEGATIVE: 1
ALLERGIC/IMMUNOLOGIC NEGATIVE: 1
COUGH: 1
SHORTNESS OF BREATH: 1

## 2023-04-20 NOTE — PROGRESS NOTES
23  Name: Greer Salcedo    : 1955    Sex: female    Age: 80 y.o. Subjective:  Chief Complaint: Patient is here for  cough  matteo  sinus  mucus     Cough        one wek  mucus  yel  no tmep chils    o2   stayin g above    89    [place] here      Review of Systems   Constitutional: Negative. HENT:  Positive for sinus pressure. Eyes: Negative. Respiratory:  Positive for cough and shortness of breath (no worse). Cardiovascular: Negative. Gastrointestinal: Negative. Endocrine: Negative. Genitourinary: Negative. Musculoskeletal: Negative. Skin: Negative. Allergic/Immunologic: Negative. Neurological: Negative. Hematological: Negative. Psychiatric/Behavioral: Negative. Current Outpatient Medications:     cefdinir (OMNICEF) 300 MG capsule, Take 1 capsule by mouth 2 times daily for 10 days, Disp: 20 capsule, Rfl: 0    promethazine-dextromethorphan (PROMETHAZINE-DM) 6.25-15 MG/5ML syrup, Take 5 mLs by mouth 4 times daily as needed for Cough, Disp: 120 mL, Rfl: 0    Insulin Pen Needle (PEN NEEDLES \") 30G X 8 MM MISC, 1 each by Does not apply route daily Insulin 4 times a day.   May change to what ever type of needle is available, Disp: 100 each, Rfl: 3    Semaglutide,0.25 or 0.5MG/DOS, 2 MG/1.5ML SOPN, Inject 0.25 mg into the skin once a week, Disp: 16 Adjustable Dose Pre-filled Pen Syringe, Rfl: 5    insulin glargine (BASAGLAR KWIKPEN) 100 UNIT/ML injection pen, Inject 25 Units into the skin nightly, Disp: 16 Adjustable Dose Pre-filled Pen Syringe, Rfl: 5    levothyroxine (SYNTHROID) 50 MCG tablet, Take 1 tablet by mouth Daily, Disp: 90 tablet, Rfl: 3    valsartan (DIOVAN) 40 MG tablet, Take 1 tablet by mouth daily, Disp: 100 tablet, Rfl: 1    valsartan (DIOVAN) 40 MG tablet, Take 1 tablet by mouth daily, Disp: 90 tablet, Rfl: 3    metFORMIN (GLUCOPHAGE) 500 MG tablet, Take 2 tablets by mouth 2 times daily (with meals), Disp: 360 tablet, Rfl: 3    fluticasone

## 2023-04-26 ENCOUNTER — PROCEDURE VISIT (OUTPATIENT)
Dept: PODIATRY | Age: 68
End: 2023-04-26
Payer: MEDICARE

## 2023-04-26 VITALS
BODY MASS INDEX: 48.49 KG/M2 | WEIGHT: 232 LBS | SYSTOLIC BLOOD PRESSURE: 138 MMHG | DIASTOLIC BLOOD PRESSURE: 83 MMHG | TEMPERATURE: 97.3 F

## 2023-04-26 DIAGNOSIS — R26.2 DIFFICULTY WALKING: ICD-10-CM

## 2023-04-26 DIAGNOSIS — M20.11 HALLUX VALGUS OF RIGHT FOOT: ICD-10-CM

## 2023-04-26 DIAGNOSIS — E11.9 ENCOUNTER FOR DIABETIC FOOT EXAM (HCC): ICD-10-CM

## 2023-04-26 DIAGNOSIS — I73.9 PERIPHERAL VASCULAR DISEASE, UNSPECIFIED (HCC): ICD-10-CM

## 2023-04-26 DIAGNOSIS — M79.675 PAIN IN LEFT TOE(S): ICD-10-CM

## 2023-04-26 DIAGNOSIS — Z79.4 TYPE 2 DIABETES MELLITUS WITHOUT COMPLICATION, WITH LONG-TERM CURRENT USE OF INSULIN (HCC): ICD-10-CM

## 2023-04-26 DIAGNOSIS — M79.674 PAIN IN TOE OF RIGHT FOOT: ICD-10-CM

## 2023-04-26 DIAGNOSIS — E11.9 TYPE 2 DIABETES MELLITUS WITHOUT COMPLICATION, WITH LONG-TERM CURRENT USE OF INSULIN (HCC): ICD-10-CM

## 2023-04-26 DIAGNOSIS — B35.1 TINEA UNGUIUM: Primary | ICD-10-CM

## 2023-04-26 PROCEDURE — 11721 DEBRIDE NAIL 6 OR MORE: CPT | Performed by: PODIATRIST

## 2023-04-26 NOTE — PROGRESS NOTES
1 [facility] PODIATRY  [address]  Dept: [phone]  Dept Fax: [phone]  Loc: [phone]    DIABETIC NAIL PROGRESS NOTE  Date of patient's visit: 4/26/2023  Patient's Name:  Alli Headings YOB: 1955            Patient Care Team:  Emeterio Merrill DO as PCP - General (Family Medicine)  Emeterio Merrill DO as PCP - Empaneled Provider  Vivian Jewell MD as Consulting Physician (Pulmonology)  Roxane Lunsford MD as Consulting Physician (Pulmonology)  Wandy Leyav MD as Consulting Physician (Cardiology)  Wilner Robledo DPM as Consulting Physician (Podiatry)          Chief Complaint   Patient presents with    Toe Pain     Saw pcp Dr. Willian Elias 4/20/2023       Subjective:   Jeff Amador comes to clinic for Toe Pain (Saw pcp Dr. Willian Elias 4/20/2023)    she is a diabetic and states that diabetic foot exam nail care and a painful right bunion. Patient relates that the bunion has been bothering her off and on for many years progressively getting worse keeping her up at night. No trauma noted. .  Pt currently has complaint of thickened, elongated nails that they cannot manage by themselves. Pt's primary care physician is Emeterio Merrill DO    . Lab Results   Component Value Date    LABA1C 6.2 (H) 03/20/2023      Complains of numbness in the feet bilat.   Past Medical History:   Diagnosis Date    Anxiety     Arthritis     Asthma     COPD (chronic obstructive pulmonary disease) ([address]     uses O2 NC 2 L continuous     Depression     Diabetes mellitus (HCC)     Edema leg     GERD (gastroesophageal reflux disease)     Hypertension     Hypothyroidism     Leukocytosis     Obesity     Osteoarthritis     Sleep apnea     no CPAP     Thyroid disease     Type 2 diabetes mellitus without complication (HCC)        Allergies   Allergen Reactions    Macrobid [Nitrofurantoin Macrocrystal] Other (See

## 2023-05-09 DIAGNOSIS — E03.9 ACQUIRED HYPOTHYROIDISM: ICD-10-CM

## 2023-05-10 RX ORDER — LEVOTHYROXINE SODIUM 0.05 MG/1
50 TABLET ORAL DAILY
Qty: 90 TABLET | Refills: 3 | Status: SHIPPED | OUTPATIENT
Start: 2023-05-10

## 2023-06-20 DIAGNOSIS — E78.2 MIXED HYPERLIPIDEMIA: ICD-10-CM

## 2023-06-20 RX ORDER — ROSUVASTATIN CALCIUM 10 MG/1
10 TABLET, COATED ORAL DAILY
Qty: 90 TABLET | Refills: 5 | Status: SHIPPED | OUTPATIENT
Start: 2023-06-20

## 2023-06-26 DIAGNOSIS — J43.9 PULMONARY EMPHYSEMA, UNSPECIFIED EMPHYSEMA TYPE (HCC): Chronic | ICD-10-CM

## 2023-06-28 RX ORDER — ALBUTEROL SULFATE 90 UG/1
AEROSOL, METERED RESPIRATORY (INHALATION)
Qty: 25.5 G | Refills: 12 | Status: SHIPPED | OUTPATIENT
Start: 2023-06-28

## 2023-07-30 DIAGNOSIS — M19.011 PRIMARY OSTEOARTHRITIS OF RIGHT SHOULDER: ICD-10-CM

## 2023-07-31 DIAGNOSIS — M19.011 PRIMARY OSTEOARTHRITIS OF RIGHT SHOULDER: ICD-10-CM

## 2023-08-01 RX ORDER — CELECOXIB 200 MG/1
200 CAPSULE ORAL DAILY
Qty: 90 CAPSULE | Refills: 3 | Status: SHIPPED | OUTPATIENT
Start: 2023-08-01

## 2023-10-06 DIAGNOSIS — Z79.4 TYPE 2 DIABETES MELLITUS WITHOUT COMPLICATION, WITH LONG-TERM CURRENT USE OF INSULIN (HCC): ICD-10-CM

## 2023-10-06 DIAGNOSIS — E11.9 TYPE 2 DIABETES MELLITUS WITHOUT COMPLICATION, WITH LONG-TERM CURRENT USE OF INSULIN (HCC): ICD-10-CM

## 2023-10-06 RX ORDER — INSULIN ASPART 100 [IU]/ML
INJECTION, SOLUTION INTRAVENOUS; SUBCUTANEOUS
Qty: 10 ADJUSTABLE DOSE PRE-FILLED PEN SYRINGE | Refills: 3 | Status: SHIPPED | OUTPATIENT
Start: 2023-10-06

## 2023-11-06 DIAGNOSIS — I10 ESSENTIAL HYPERTENSION: Chronic | ICD-10-CM

## 2023-11-07 DIAGNOSIS — I10 ESSENTIAL HYPERTENSION: Chronic | ICD-10-CM

## 2023-11-08 RX ORDER — HYDROCHLOROTHIAZIDE 12.5 MG/1
12.5 CAPSULE, GELATIN COATED ORAL DAILY
Qty: 90 CAPSULE | Refills: 5 | Status: SHIPPED | OUTPATIENT
Start: 2023-11-08

## 2023-11-16 ENCOUNTER — OFFICE VISIT (OUTPATIENT)
Dept: FAMILY MEDICINE CLINIC | Age: 68
End: 2023-11-16

## 2023-11-16 VITALS
SYSTOLIC BLOOD PRESSURE: 118 MMHG | HEIGHT: 58 IN | OXYGEN SATURATION: 99 % | HEART RATE: 95 BPM | WEIGHT: 236 LBS | BODY MASS INDEX: 49.54 KG/M2 | TEMPERATURE: 97 F | DIASTOLIC BLOOD PRESSURE: 78 MMHG

## 2023-11-16 DIAGNOSIS — J40 SINOBRONCHITIS: Primary | ICD-10-CM

## 2023-11-16 DIAGNOSIS — Z20.818 STREPTOCOCCUS GROUP A EXPOSURE: ICD-10-CM

## 2023-11-16 DIAGNOSIS — J32.9 SINOBRONCHITIS: Primary | ICD-10-CM

## 2023-11-16 DIAGNOSIS — R05.9 COUGH, UNSPECIFIED TYPE: ICD-10-CM

## 2023-11-16 LAB
INFLUENZA A ANTIBODY: NEGATIVE
INFLUENZA B ANTIBODY: NEGATIVE
Lab: NORMAL
PERFORMING INSTRUMENT: NORMAL
QC PASS/FAIL: NORMAL
S PYO AG THROAT QL: NORMAL
SARS-COV-2, POC: NORMAL

## 2023-11-16 RX ORDER — DULOXETIN HYDROCHLORIDE 30 MG/1
CAPSULE, DELAYED RELEASE ORAL
COMMUNITY
Start: 2023-09-05

## 2023-11-16 RX ORDER — BENZONATATE 200 MG/1
200 CAPSULE ORAL 3 TIMES DAILY PRN
Qty: 30 CAPSULE | Refills: 0 | Status: SHIPPED | OUTPATIENT
Start: 2023-11-16 | End: 2023-11-23

## 2023-11-16 RX ORDER — GUAIFENESIN 600 MG/1
600 TABLET, EXTENDED RELEASE ORAL 2 TIMES DAILY
Qty: 30 TABLET | Refills: 0 | Status: SHIPPED | OUTPATIENT
Start: 2023-11-16 | End: 2023-12-01

## 2023-11-16 RX ORDER — CEFDINIR 300 MG/1
300 CAPSULE ORAL 2 TIMES DAILY
Qty: 20 CAPSULE | Refills: 0 | Status: SHIPPED | OUTPATIENT
Start: 2023-11-16 | End: 2023-11-26

## 2023-11-16 ASSESSMENT — ENCOUNTER SYMPTOMS
SINUS PRESSURE: 1
NAUSEA: 0
VOMITING: 0
EYE REDNESS: 0
BACK PAIN: 0
ABDOMINAL DISTENTION: 0
EYE DISCHARGE: 0
EYE PAIN: 0
SHORTNESS OF BREATH: 0
DIARRHEA: 0
WHEEZING: 0
COUGH: 1
SORE THROAT: 1

## 2023-11-16 NOTE — PROGRESS NOTES
Chief Complaint:   Sinus Problem, Congestion (Started 1 week ago), Sore Throat, Headache, and Cough (Chills and body aches)      History of Present Illness   HPI:  Nabil Patton is a 80 y.o. female who presents to [address] [place] [address] today for head and chest congestion, sore throat and headache; daughter STREPT Throat confirmed    Prior to Visit Medications    Medication Sig Taking?  Authorizing Provider   cefdinir (OMNICEF) 300 MG capsule Take 1 capsule by mouth 2 times daily for 10 days Yes Bert Gonzales,    guaiFENesin (MUCINEX) 600 MG extended release tablet Take 1 tablet by mouth 2 times daily for 15 days Yes Bert Gonzales DO   benzonatate (TESSALON) 200 MG capsule Take 1 capsule by mouth 3 times daily as needed for Cough Yes Bert Gonzales,    hydroCHLOROthiazide (MICROZIDE) 12.5 MG capsule Take 1 capsule by mouth daily Yes Adi Sesay DO   hydroCHLOROthiazide (MICROZIDE) 12.5 MG capsule Take 1 capsule by mouth daily Yes Adi Sesay DO   Insulin Pen Needle 31G X 8 MM MISC Use to inject insulin 4 times daily Yes Adi Sesay DO   insulin aspart (NOVOLOG FLEXPEN) 100 UNIT/ML injection pen Inject insulin 3 times daily before meals according to sliding scale with a max dose of 20 units Yes Adi Sesay DO   fluticasone-umeclidin-vilant (TRELEGY ELLIPTA) 200-62.5-25 MCG/ACT AEPB inhaler Inhale 1 puff into the lungs daily Yes Pato Leblanca APRN - NP   celecoxib (CELEBREX) 200 MG capsule Take 1 capsule by mouth daily Yes Adi Sesay DO   celecoxib (CELEBREX) 200 MG capsule Take 1 capsule by mouth daily Yes Adi Sesay DO   diclofenac sodium (VOLTAREN) 1 % GEL Apply 4 g topically 4 times daily Yes Amalia Anderson DPM   albuterol sulfate HFA (PROAIR HFA) 108 (90 Base) MCG/ACT inhaler INHALE TWO PUFFS BY MOUTH FOUR TIMES A DAY AS NEEDED Yes Adi Sesay DO   rosuvastatin (CRESTOR) 10 MG tablet Take 1 tablet by mouth daily Yes Adi Sesay DO   losartan (COZAAR) 50

## 2023-12-05 ENCOUNTER — OFFICE VISIT (OUTPATIENT)
Dept: PRIMARY CARE CLINIC | Age: 68
End: 2023-12-05
Payer: MEDICARE

## 2023-12-05 VITALS
HEART RATE: 102 BPM | TEMPERATURE: 97.6 F | SYSTOLIC BLOOD PRESSURE: 128 MMHG | OXYGEN SATURATION: 93 % | DIASTOLIC BLOOD PRESSURE: 78 MMHG

## 2023-12-05 DIAGNOSIS — R05.1 ACUTE COUGH: ICD-10-CM

## 2023-12-05 DIAGNOSIS — U07.1 COVID-19: Primary | ICD-10-CM

## 2023-12-05 DIAGNOSIS — J20.8 ACUTE BRONCHITIS DUE TO OTHER SPECIFIED ORGANISMS: ICD-10-CM

## 2023-12-05 LAB
Lab: ABNORMAL
PERFORMING INSTRUMENT: ABNORMAL
QC PASS/FAIL: ABNORMAL
RSV ANTIGEN: NORMAL
SARS-COV-2, POC: DETECTED

## 2023-12-05 PROCEDURE — 96372 THER/PROPH/DIAG INJ SC/IM: CPT | Performed by: FAMILY MEDICINE

## 2023-12-05 PROCEDURE — 99213 OFFICE O/P EST LOW 20 MIN: CPT | Performed by: FAMILY MEDICINE

## 2023-12-05 PROCEDURE — 86756 RESPIRATORY VIRUS ANTIBODY: CPT | Performed by: FAMILY MEDICINE

## 2023-12-05 PROCEDURE — 3078F DIAST BP <80 MM HG: CPT | Performed by: FAMILY MEDICINE

## 2023-12-05 PROCEDURE — 87426 SARSCOV CORONAVIRUS AG IA: CPT | Performed by: FAMILY MEDICINE

## 2023-12-05 PROCEDURE — 3074F SYST BP LT 130 MM HG: CPT | Performed by: FAMILY MEDICINE

## 2023-12-05 PROCEDURE — 1123F ACP DISCUSS/DSCN MKR DOCD: CPT | Performed by: FAMILY MEDICINE

## 2023-12-05 RX ORDER — CEFTRIAXONE SODIUM 250 MG/1
250 INJECTION, POWDER, FOR SOLUTION INTRAMUSCULAR; INTRAVENOUS ONCE
Status: COMPLETED | OUTPATIENT
Start: 2023-12-05 | End: 2023-12-05

## 2023-12-05 RX ORDER — PREDNISONE 5 MG/1
TABLET ORAL
Qty: 18 TABLET | Refills: 0 | Status: SHIPPED | OUTPATIENT
Start: 2023-12-05

## 2023-12-05 RX ORDER — DOXYCYCLINE HYCLATE 100 MG
100 TABLET ORAL 2 TIMES DAILY
Qty: 20 TABLET | Refills: 0 | Status: SHIPPED | OUTPATIENT
Start: 2023-12-05 | End: 2023-12-15

## 2023-12-05 RX ORDER — DEXTROMETHORPHAN HYDROBROMIDE AND PROMETHAZINE HYDROCHLORIDE 15; 6.25 MG/5ML; MG/5ML
5 SYRUP ORAL 4 TIMES DAILY PRN
Qty: 120 ML | Refills: 0 | Status: SHIPPED | OUTPATIENT
Start: 2023-12-05 | End: 2023-12-12

## 2023-12-05 RX ADMIN — CEFTRIAXONE SODIUM 250 MG: 250 INJECTION, POWDER, FOR SOLUTION INTRAMUSCULAR; INTRAVENOUS at 15:38

## 2023-12-05 SDOH — ECONOMIC STABILITY: FOOD INSECURITY: WITHIN THE PAST 12 MONTHS, THE FOOD YOU BOUGHT JUST DIDN'T LAST AND YOU DIDN'T HAVE MONEY TO GET MORE.: NEVER TRUE

## 2023-12-05 SDOH — ECONOMIC STABILITY: INCOME INSECURITY: HOW HARD IS IT FOR YOU TO PAY FOR THE VERY BASICS LIKE FOOD, HOUSING, MEDICAL CARE, AND HEATING?: NOT HARD AT ALL

## 2023-12-05 SDOH — ECONOMIC STABILITY: FOOD INSECURITY: WITHIN THE PAST 12 MONTHS, YOU WORRIED THAT YOUR FOOD WOULD RUN OUT BEFORE YOU GOT MONEY TO BUY MORE.: NEVER TRUE

## 2023-12-05 ASSESSMENT — ENCOUNTER SYMPTOMS
EYES NEGATIVE: 1
COUGH: 1
RHINORRHEA: 1
ALLERGIC/IMMUNOLOGIC NEGATIVE: 1
GASTROINTESTINAL NEGATIVE: 1

## 2023-12-05 ASSESSMENT — PATIENT HEALTH QUESTIONNAIRE - PHQ9
SUM OF ALL RESPONSES TO PHQ QUESTIONS 1-9: 0
SUM OF ALL RESPONSES TO PHQ QUESTIONS 1-9: 0
2. FEELING DOWN, DEPRESSED OR HOPELESS: 0
SUM OF ALL RESPONSES TO PHQ QUESTIONS 1-9: 0
1. LITTLE INTEREST OR PLEASURE IN DOING THINGS: 0
SUM OF ALL RESPONSES TO PHQ9 QUESTIONS 1 & 2: 0
SUM OF ALL RESPONSES TO PHQ QUESTIONS 1-9: 0

## 2023-12-05 NOTE — PROGRESS NOTES
23  Name: Aj Son    : 1955    Sex: female    Age: 80 y.o. Subjective:  Chief Complaint: Patient is here for  couigh matteo sinsu mucus     Ten d  but   flare      last three d  no  t         slight  chills  No  cp   sl icn  bowen  Here with karon  Covid  pos        Review of Systems   Constitutional:  Positive for chills. HENT:  Positive for rhinorrhea. Eyes: Negative. Respiratory:  Positive for cough. Cardiovascular: Negative. Gastrointestinal: Negative. Endocrine: Negative. Genitourinary: Negative. Musculoskeletal: Negative. Skin: Negative. Allergic/Immunologic: Negative. Neurological: Negative. Hematological: Negative. Psychiatric/Behavioral: Negative. Current Outpatient Medications:     nirmatrelvir/ritonavir (PAXLOVID) 10 x 150 MG & 10 x 100MG TBPK, Take 2 tablets (one 150 mg nirmatrelvir and one 100 mg ritonavir tablets) by mouth every 12 hours for 5 days.  Renal dose, Disp: 20 tablet, Rfl: 0    doxycycline hyclate (VIBRA-TABS) 100 MG tablet, Take 1 tablet by mouth 2 times daily for 10 days, Disp: 20 tablet, Rfl: 0    predniSONE (DELTASONE) 5 MG tablet, ONE TID FOR THREE DAYS, ONE BID FOR THREE DAYS, ONE QD FOR THREE DAYS   FOOD hold is sugar over 200, Disp: 18 tablet, Rfl: 0    promethazine-dextromethorphan (PROMETHAZINE-DM) 6.25-15 MG/5ML syrup, Take 5 mLs by mouth 4 times daily as needed for Cough, Disp: 120 mL, Rfl: 0    DULoxetine (CYMBALTA) 30 MG extended release capsule, , Disp: , Rfl:     hydroCHLOROthiazide (MICROZIDE) 12.5 MG capsule, Take 1 capsule by mouth daily, Disp: 90 capsule, Rfl: 5    hydroCHLOROthiazide (MICROZIDE) 12.5 MG capsule, Take 1 capsule by mouth daily, Disp: 90 capsule, Rfl: 5    Insulin Pen Needle 31G X 8 MM MISC, Use to inject insulin 4 times daily, Disp: 400 each, Rfl: 3    insulin aspart (NOVOLOG FLEXPEN) 100 UNIT/ML injection pen, Inject insulin 3 times daily before meals according to sliding scale with a max

## 2024-02-01 DIAGNOSIS — E11.9 TYPE 2 DIABETES MELLITUS WITHOUT COMPLICATION, WITH LONG-TERM CURRENT USE OF INSULIN (HCC): ICD-10-CM

## 2024-02-01 DIAGNOSIS — Z79.4 TYPE 2 DIABETES MELLITUS WITHOUT COMPLICATION, WITH LONG-TERM CURRENT USE OF INSULIN (HCC): ICD-10-CM

## 2024-02-01 RX ORDER — INSULIN ASPART 100 [IU]/ML
INJECTION, SOLUTION INTRAVENOUS; SUBCUTANEOUS
Qty: 10 ADJUSTABLE DOSE PRE-FILLED PEN SYRINGE | Refills: 3 | OUTPATIENT
Start: 2024-02-01

## 2024-02-01 NOTE — TELEPHONE ENCOUNTER
Patients last appointment 12/5/2023.  Patients next scheduled appointment   Future Appointments   Date Time Provider Department Center   8/1/2024  1:30 PM Kya Avelar APRN - NP [facility]

## 2024-02-02 RX ORDER — INSULIN ASPART 100 [IU]/ML
INJECTION, SOLUTION INTRAVENOUS; SUBCUTANEOUS
Qty: 10 ADJUSTABLE DOSE PRE-FILLED PEN SYRINGE | Refills: 3 | Status: SHIPPED | OUTPATIENT
Start: 2024-02-02

## 2024-02-12 ENCOUNTER — HOSPITAL ENCOUNTER (OUTPATIENT)
Dept: CT IMAGING | Age: 69
Discharge: HOME | End: 2024-02-14
Payer: MEDICARE

## 2024-02-12 DIAGNOSIS — F17.210 SMOKING GREATER THAN 30 PACK YEARS: ICD-10-CM

## 2024-02-12 PROCEDURE — 71271 CT THORAX LUNG CANCER SCR C-: CPT

## 2024-02-13 ENCOUNTER — TELEPHONE (OUTPATIENT)
Dept: CASE MANAGEMENT | Age: 69
End: 2024-02-13

## 2024-02-13 NOTE — TELEPHONE ENCOUNTER
No call, encounter opened to process CT Lung Screening.    CT Lung Screen: 2/12/2024    IMPRESSION:  1. There is no pulmonary infiltrate, mass or suspicious pulmonary nodule  2. Emphysematous changes     LUNG RADS:  Lung-RADS 1 - Negative (v2022)     Management:  12 month screening LDCT     RECOMMENDATIONS:  If you would like to register your patient with the [facility] Lung Nodule/Lung  Cancer Screening Program, please contact the Nurse Navigator at  [phone].    Pack years: 30    Social History     Tobacco Use  Smoking Status: Former Smoker    Start Date: 1987   Quit Date: 2017   Types: Cigarettes   Packs/Day: 1   Years: 30   Pack Years: 30   Smokeless Tobacco: Never         Results letter sent to patient via my chart or mailed.     Americo Dillon  Imaging Navigator   [facility]   [phone]

## 2024-02-16 DIAGNOSIS — I10 ESSENTIAL HYPERTENSION: Chronic | ICD-10-CM

## 2024-02-16 DIAGNOSIS — Z79.4 TYPE 2 DIABETES MELLITUS WITHOUT COMPLICATION, WITH LONG-TERM CURRENT USE OF INSULIN (HCC): Chronic | ICD-10-CM

## 2024-02-16 DIAGNOSIS — E11.9 TYPE 2 DIABETES MELLITUS WITHOUT COMPLICATION, WITH LONG-TERM CURRENT USE OF INSULIN (HCC): Chronic | ICD-10-CM

## 2024-02-16 RX ORDER — VALSARTAN 40 MG/1
40 TABLET ORAL DAILY
Qty: 90 TABLET | Refills: 3 | Status: CANCELLED | OUTPATIENT
Start: 2024-02-16

## 2024-02-16 RX ORDER — GABAPENTIN 300 MG/1
300 CAPSULE ORAL NIGHTLY
Qty: 90 CAPSULE | Refills: 3 | Status: CANCELLED | OUTPATIENT
Start: 2024-02-16 | End: 2025-02-15

## 2024-02-17 RX ORDER — GABAPENTIN 300 MG/1
300 CAPSULE ORAL NIGHTLY
Qty: 90 CAPSULE | Refills: 3 | Status: SHIPPED | OUTPATIENT
Start: 2024-02-17 | End: 2025-02-16

## 2024-02-17 RX ORDER — VALSARTAN 40 MG/1
40 TABLET ORAL DAILY
Qty: 90 TABLET | Refills: 3 | Status: SHIPPED | OUTPATIENT
Start: 2024-02-17

## 2024-02-23 DIAGNOSIS — Z79.4 TYPE 2 DIABETES MELLITUS WITHOUT COMPLICATION, WITH LONG-TERM CURRENT USE OF INSULIN (HCC): ICD-10-CM

## 2024-02-23 DIAGNOSIS — E11.9 TYPE 2 DIABETES MELLITUS WITHOUT COMPLICATION, WITH LONG-TERM CURRENT USE OF INSULIN (HCC): ICD-10-CM

## 2024-02-26 RX ORDER — INSULIN GLARGINE 100 [IU]/ML
25 INJECTION, SOLUTION SUBCUTANEOUS NIGHTLY
Qty: 16 ADJUSTABLE DOSE PRE-FILLED PEN SYRINGE | Refills: 5 | Status: SHIPPED | OUTPATIENT
Start: 2024-02-26

## 2024-03-13 ENCOUNTER — ENROLLMENT (OUTPATIENT)
Dept: PHARMACY | Facility: CLINIC | Age: 69
End: 2024-03-13

## 2024-04-03 ENCOUNTER — TELEPHONE (OUTPATIENT)
Dept: PRIMARY CARE CLINIC | Age: 69
End: 2024-04-03

## 2024-04-03 DIAGNOSIS — Z79.4 TYPE 2 DIABETES MELLITUS WITHOUT COMPLICATION, WITH LONG-TERM CURRENT USE OF INSULIN (HCC): Primary | ICD-10-CM

## 2024-04-03 DIAGNOSIS — I10 ESSENTIAL HYPERTENSION: ICD-10-CM

## 2024-04-03 DIAGNOSIS — M81.0 AGE-RELATED OSTEOPOROSIS WITHOUT CURRENT PATHOLOGICAL FRACTURE: ICD-10-CM

## 2024-04-03 DIAGNOSIS — E11.9 TYPE 2 DIABETES MELLITUS WITHOUT COMPLICATION, WITH LONG-TERM CURRENT USE OF INSULIN (HCC): Primary | ICD-10-CM

## 2024-04-03 DIAGNOSIS — E03.9 ACQUIRED HYPOTHYROIDISM: ICD-10-CM

## 2024-04-05 DIAGNOSIS — E03.9 ACQUIRED HYPOTHYROIDISM: ICD-10-CM

## 2024-04-05 DIAGNOSIS — Z79.4 TYPE 2 DIABETES MELLITUS WITHOUT COMPLICATION, WITH LONG-TERM CURRENT USE OF INSULIN (HCC): ICD-10-CM

## 2024-04-05 DIAGNOSIS — M81.0 AGE-RELATED OSTEOPOROSIS WITHOUT CURRENT PATHOLOGICAL FRACTURE: ICD-10-CM

## 2024-04-05 DIAGNOSIS — I10 ESSENTIAL HYPERTENSION: ICD-10-CM

## 2024-04-05 DIAGNOSIS — E11.9 TYPE 2 DIABETES MELLITUS WITHOUT COMPLICATION, WITH LONG-TERM CURRENT USE OF INSULIN (HCC): ICD-10-CM

## 2024-04-05 LAB
ALBUMIN SERPL-MCNC: 4.1 G/DL (ref 3.5–5.2)
ALP BLD-CCNC: 93 U/L (ref 35–104)
ALT SERPL-CCNC: 16 U/L (ref 0–32)
ANION GAP SERPL CALCULATED.3IONS-SCNC: 20 MMOL/L (ref 7–16)
AST SERPL-CCNC: 21 U/L (ref 0–31)
BASOPHILS ABSOLUTE: 0.07 K/UL (ref 0–0.2)
BASOPHILS RELATIVE PERCENT: 1 % (ref 0–2)
BILIRUB SERPL-MCNC: 0.2 MG/DL (ref 0–1.2)
BUN BLDV-MCNC: 17 MG/DL (ref 6–23)
CALCIUM SERPL-MCNC: 9.4 MG/DL (ref 8.6–10.2)
CHLORIDE BLD-SCNC: 96 MMOL/L (ref 98–107)
CHOLESTEROL: 118 MG/DL
CO2: 22 MMOL/L (ref 22–29)
CREAT SERPL-MCNC: 0.9 MG/DL (ref 0.5–1)
CREATININE URINE: 30.9 MG/DL (ref 29–226)
EOSINOPHILS ABSOLUTE: 0.26 K/UL (ref 0.05–0.5)
EOSINOPHILS RELATIVE PERCENT: 2 % (ref 0–6)
GFR SERPL CREATININE-BSD FRML MDRD: 73 ML/MIN/1.73M2
GLUCOSE BLD-MCNC: 156 MG/DL (ref 74–99)
HBA1C MFR BLD: 8.4 % (ref 4–5.6)
HCT VFR BLD CALC: 41.6 % (ref 34–48)
HDLC SERPL-MCNC: 42 MG/DL
HEMOGLOBIN: 12.8 G/DL (ref 11.5–15.5)
IMMATURE GRANULOCYTES %: 1 % (ref 0–5)
IMMATURE GRANULOCYTES ABSOLUTE: 0.07 K/UL (ref 0–0.58)
LDL CHOLESTEROL: 25 MG/DL
LYMPHOCYTES ABSOLUTE: 2.74 K/UL (ref 1.5–4)
LYMPHOCYTES RELATIVE PERCENT: 25 % (ref 20–42)
MCH RBC QN AUTO: 26 PG (ref 26–35)
MCHC RBC AUTO-ENTMCNC: 30.8 G/DL (ref 32–34.5)
MCV RBC AUTO: 84.4 FL (ref 80–99.9)
MICROALBUMIN/CREAT 24H UR: <12 MG/L (ref 0–19)
MICROALBUMIN/CREAT UR-RTO: NORMAL MCG/MG CREAT (ref 0–30)
MONOCYTES ABSOLUTE: 0.74 K/UL (ref 0.1–0.95)
MONOCYTES RELATIVE PERCENT: 7 % (ref 2–12)
NEUTROPHILS ABSOLUTE: 7.03 K/UL (ref 1.8–7.3)
NEUTROPHILS RELATIVE PERCENT: 65 % (ref 43–80)
PDW BLD-RTO: 14.8 % (ref 11.5–15)
PLATELET # BLD: 290 K/UL (ref 130–450)
PMV BLD AUTO: 12.2 FL (ref 7–12)
POTASSIUM SERPL-SCNC: 4.2 MMOL/L (ref 3.5–5)
RBC # BLD: 4.93 M/UL (ref 3.5–5.5)
SODIUM BLD-SCNC: 138 MMOL/L (ref 132–146)
T4 TOTAL: 8.5 UG/DL (ref 4.5–11.7)
TOTAL PROTEIN: 7.1 G/DL (ref 6.4–8.3)
TRIGL SERPL-MCNC: 257 MG/DL
TSH SERPL DL<=0.05 MIU/L-ACNC: 2.74 UIU/ML (ref 0.27–4.2)
VITAMIN D 25-HYDROXY: 87 NG/ML (ref 30–100)
VLDLC SERPL CALC-MCNC: 51 MG/DL
WBC # BLD: 10.9 K/UL (ref 4.5–11.5)

## 2024-04-12 ENCOUNTER — OFFICE VISIT (OUTPATIENT)
Dept: PRIMARY CARE CLINIC | Age: 69
End: 2024-04-12

## 2024-04-12 VITALS
HEART RATE: 98 BPM | WEIGHT: 243 LBS | TEMPERATURE: 97.7 F | SYSTOLIC BLOOD PRESSURE: 138 MMHG | DIASTOLIC BLOOD PRESSURE: 83 MMHG | HEIGHT: 58 IN | BODY MASS INDEX: 51.01 KG/M2 | RESPIRATION RATE: 17 BRPM | OXYGEN SATURATION: 96 %

## 2024-04-12 DIAGNOSIS — E03.9 ACQUIRED HYPOTHYROIDISM: ICD-10-CM

## 2024-04-12 DIAGNOSIS — Z12.12 SCREENING FOR COLORECTAL CANCER: ICD-10-CM

## 2024-04-12 DIAGNOSIS — M81.0 AGE-RELATED OSTEOPOROSIS WITHOUT CURRENT PATHOLOGICAL FRACTURE: ICD-10-CM

## 2024-04-12 DIAGNOSIS — Z12.11 SCREENING FOR COLORECTAL CANCER: ICD-10-CM

## 2024-04-12 DIAGNOSIS — E11.42 TYPE 2 DIABETES MELLITUS WITH DIABETIC POLYNEUROPATHY, WITH LONG-TERM CURRENT USE OF INSULIN (HCC): ICD-10-CM

## 2024-04-12 DIAGNOSIS — Z00.00 MEDICARE ANNUAL WELLNESS VISIT, SUBSEQUENT: Primary | ICD-10-CM

## 2024-04-12 DIAGNOSIS — Z79.4 TYPE 2 DIABETES MELLITUS WITH DIABETIC POLYNEUROPATHY, WITH LONG-TERM CURRENT USE OF INSULIN (HCC): ICD-10-CM

## 2024-04-12 DIAGNOSIS — Z12.31 SCREENING MAMMOGRAM FOR BREAST CANCER: ICD-10-CM

## 2024-04-12 DIAGNOSIS — E78.2 MIXED HYPERLIPIDEMIA: ICD-10-CM

## 2024-04-12 DIAGNOSIS — J44.9 COPD WITHOUT EXACERBATION (HCC): Chronic | ICD-10-CM

## 2024-04-12 DIAGNOSIS — E53.8 VITAMIN B 12 DEFICIENCY: ICD-10-CM

## 2024-04-12 DIAGNOSIS — N18.31 STAGE 3A CHRONIC KIDNEY DISEASE (HCC): ICD-10-CM

## 2024-04-12 DIAGNOSIS — F32.89 OTHER DEPRESSION: ICD-10-CM

## 2024-04-12 RX ORDER — ESCITALOPRAM OXALATE 10 MG/1
TABLET ORAL
Qty: 90 TABLET | Refills: 3 | Status: SHIPPED | OUTPATIENT
Start: 2024-04-12

## 2024-04-12 ASSESSMENT — LIFESTYLE VARIABLES: HOW MANY STANDARD DRINKS CONTAINING ALCOHOL DO YOU HAVE ON A TYPICAL DAY: PATIENT DOES NOT DRINK

## 2024-04-12 ASSESSMENT — PATIENT HEALTH QUESTIONNAIRE - PHQ9
SUM OF ALL RESPONSES TO PHQ QUESTIONS 1-9: 0
SUM OF ALL RESPONSES TO PHQ QUESTIONS 1-9: 0
1. LITTLE INTEREST OR PLEASURE IN DOING THINGS: NOT AT ALL
2. FEELING DOWN, DEPRESSED OR HOPELESS: NOT AT ALL
SUM OF ALL RESPONSES TO PHQ9 QUESTIONS 1 & 2: 0
SUM OF ALL RESPONSES TO PHQ QUESTIONS 1-9: 0
SUM OF ALL RESPONSES TO PHQ QUESTIONS 1-9: 0

## 2024-04-12 NOTE — PROGRESS NOTES
Inhale 3 mLs into the lungs every 4 hours  Kya Avelar, APRN - NP   levothyroxine (SYNTHROID) 50 MCG tablet Take 1 tablet by mouth Daily  Adi Sesay DO   Insulin Pen Needle (PEN NEEDLES 5/16\") 30G X 8 MM MISC 1 each by Does not apply route daily Insulin 4 times a day.  May change to what ever type of needle is available  Adi Sesay DO   fluticasone (FLONASE) 50 MCG/ACT nasal spray 1 spray by Each Nostril route daily  Adi Sesay DO   budesonide (PULMICORT) 0.25 MG/2ML nebulizer suspension Take 2 mLs by nebulization 2 times daily  Gaby Godinez DO   Handicap Placard MISC by Does not apply route Dx  copd       5 yrs  Adi Sesay DO   hydrocortisone 2.5 % cream Apply topically 4  times daily.  Adi Sesay DO   aspirin 81 MG EC tablet Take 1 tablet by mouth daily  ProviderBrown MD   TURMERIC PO Take by mouth daily LD 2-14-20  Brown Croft MD   ipratropium-albuterol (DUONEB) 0.5-2.5 (3) MG/3ML SOLN nebulizer solution Inhale 3 mLs into the lungs every 4 hours as needed for Shortness of Breath Instructed to take am of procedure if needed  Brown Croft MD   Cholecalciferol (VITAMIN D3) 2000 units CAPS Take 5,000 Units by mouth daily Ld 2-14-20  Brown Croft MD   Coenzyme Q10 (COQ-10) 10 MG CAPS Take by mouth daily Ld 2-14-20  Brown Croft MD       CareTeam (Including outside providers/suppliers regularly involved in providing care):   Patient Care Team:  Adi Sesay DO as PCP - General (Family Medicine)  Adi Sesay DO as PCP - Empaneled Provider  Mason Shi MD as Consulting Physician (Pulmonology)  Freddy Chapin MD as Consulting Physician (Pulmonology)  Kevin Montanez MD as Consulting Physician (Cardiology)  Brandyn Ybarra DPM as Consulting Physician (Podiatry)     Reviewed and updated this visit:  Tobacco  Allergies  Med Hx  Surg Hx  Soc Hx  Fam Hx         Appt endo   back on  ozempic   diet

## 2024-04-12 NOTE — PATIENT INSTRUCTIONS
blindness.  Color vision is often tested as part of a routine exam. You may also have this test when you apply for a job where recognizing different colors is important, such as , electronics, or the .  How are vision tests done?  Visual acuity test   You cover one eye at a time.  You read aloud from a wall chart across the room.  You read aloud from a small card that you hold in your hand.  Refraction   You look into a special device.  The device puts lenses of different strengths in front of each eye to see how strong your glasses or contact lenses need to be.  Visual field tests   Your doctor may have you look through special machines.  Or your doctor may simply have you stare straight ahead while they move a finger into and out of your field of vision.  Color vision test   You look at pieces of printed test patterns in various colors. You say what number or symbol you see.  Your doctor may have you trace the number or symbol using a pointer.  How do these tests feel?  There is very little chance of having a problem from this test. If dilating drops are used for a vision test, they may make the eyes sting and cause a medicine taste in the mouth.  Follow-up care is a key part of your treatment and safety. Be sure to make and go to all appointments, and call your doctor if you are having problems. It's also a good idea to know your test results and keep a list of the medicines you take.  Where can you learn more?  Go to https://www.[employer].net/patientEd and enter G551 to learn more about \"Learning About Vision Tests.\"  Current as of: June 5, 2023               Content Version: 14.0  © 2463-9660 [employer].   Care instructions adapted under license by [employer]. If you have questions about a medical condition or this instruction, always ask your healthcare professional. [employer] disclaims any warranty or liability for your use of this information.

## 2024-05-02 DIAGNOSIS — E03.9 ACQUIRED HYPOTHYROIDISM: ICD-10-CM

## 2024-05-02 NOTE — TELEPHONE ENCOUNTER
Patients last appointment 4/12/2024.  Patients next scheduled appointment   Future Appointments   Date Time Provider Department Center   6/25/2024  4:00 PM Brandyn Ybarra DPM [facility]   7/2/2024  2:15 PM Kevin Montanez MD [place] [facility]   8/1/2024  1:30 PM Kya Avelar, APRN - NP [facility]   10/9/2024  1:15 PM Adi Sesay DO [facility]   10/22/2024 10:00 AM Alex Seals MD BDM ENDO [facility]

## 2024-05-03 RX ORDER — LEVOTHYROXINE SODIUM 0.05 MG/1
50 TABLET ORAL DAILY
Qty: 90 TABLET | Refills: 3 | Status: SHIPPED | OUTPATIENT
Start: 2024-05-03

## 2024-05-10 LAB — NONINV COLON CA DNA+OCC BLD SCRN STL QL: NEGATIVE

## 2024-05-25 DIAGNOSIS — Z79.4 TYPE 2 DIABETES MELLITUS WITHOUT COMPLICATION, WITH LONG-TERM CURRENT USE OF INSULIN (HCC): ICD-10-CM

## 2024-05-25 DIAGNOSIS — E11.9 TYPE 2 DIABETES MELLITUS WITHOUT COMPLICATION, WITH LONG-TERM CURRENT USE OF INSULIN (HCC): ICD-10-CM

## 2024-05-26 DIAGNOSIS — J43.9 PULMONARY EMPHYSEMA, UNSPECIFIED EMPHYSEMA TYPE (HCC): Chronic | ICD-10-CM

## 2024-05-27 RX ORDER — INSULIN ASPART 100 [IU]/ML
INJECTION, SOLUTION INTRAVENOUS; SUBCUTANEOUS
Qty: 10 ADJUSTABLE DOSE PRE-FILLED PEN SYRINGE | Refills: 3 | Status: SHIPPED | OUTPATIENT
Start: 2024-05-27

## 2024-05-27 RX ORDER — FLUTICASONE PROPIONATE 50 MCG
1 SPRAY, SUSPENSION (ML) NASAL DAILY
Qty: 1 EACH | Refills: 12 | Status: SHIPPED | OUTPATIENT
Start: 2024-05-27

## 2024-05-28 ENCOUNTER — CARE COORDINATION (OUTPATIENT)
Dept: CARE COORDINATION | Age: 69
End: 2024-05-28

## 2024-05-28 NOTE — CARE COORDINATION
-ACM attempted to reach patient to offer enrollment into Care Coordination program & RPM services, however no answer.  -HIPAA compliant VM left introducing self, reason for call, and brief explanation of program.  -Left ACM's contact information, requesting call back. If no return call, will attempt outreach again.

## 2024-05-29 ENCOUNTER — CARE COORDINATION (OUTPATIENT)
Dept: CARE COORDINATION | Age: 69
End: 2024-05-29

## 2024-05-29 NOTE — CARE COORDINATION
-ACM spoke with patient to offer enrollment into Care Coordination program & RPM services.  -Introduced self, reason for call, and explanation of program.   -Patient interested but unable to enroll today.  Enrollment scheduled for next week.   -Plan: ACM will outreach patient at an agreed upon date and time for Care Coordination enrollment.

## 2024-06-03 ENCOUNTER — OFFICE VISIT (OUTPATIENT)
Dept: FAMILY MEDICINE CLINIC | Age: 69
End: 2024-06-03
Payer: MEDICARE

## 2024-06-03 ENCOUNTER — CARE COORDINATION (OUTPATIENT)
Dept: CARE COORDINATION | Age: 69
End: 2024-06-03

## 2024-06-03 VITALS
DIASTOLIC BLOOD PRESSURE: 68 MMHG | HEIGHT: 58 IN | HEART RATE: 95 BPM | WEIGHT: 235 LBS | SYSTOLIC BLOOD PRESSURE: 132 MMHG | OXYGEN SATURATION: 94 % | TEMPERATURE: 98.6 F | BODY MASS INDEX: 49.33 KG/M2

## 2024-06-03 DIAGNOSIS — R06.02 SHORTNESS OF BREATH: ICD-10-CM

## 2024-06-03 DIAGNOSIS — J44.1 COPD WITH ACUTE EXACERBATION (HCC): Primary | ICD-10-CM

## 2024-06-03 PROCEDURE — 1123F ACP DISCUSS/DSCN MKR DOCD: CPT

## 2024-06-03 PROCEDURE — 96372 THER/PROPH/DIAG INJ SC/IM: CPT

## 2024-06-03 PROCEDURE — 3078F DIAST BP <80 MM HG: CPT

## 2024-06-03 PROCEDURE — 3075F SYST BP GE 130 - 139MM HG: CPT

## 2024-06-03 PROCEDURE — 99214 OFFICE O/P EST MOD 30 MIN: CPT

## 2024-06-03 RX ORDER — PREDNISONE 20 MG/1
20 TABLET ORAL 2 TIMES DAILY
Qty: 10 TABLET | Refills: 0 | Status: SHIPPED | OUTPATIENT
Start: 2024-06-03 | End: 2024-06-08

## 2024-06-03 RX ORDER — CEFTRIAXONE 1 G/1
1000 INJECTION, POWDER, FOR SOLUTION INTRAMUSCULAR; INTRAVENOUS ONCE
Status: COMPLETED | OUTPATIENT
Start: 2024-06-03 | End: 2024-06-03

## 2024-06-03 RX ORDER — CEFDINIR 300 MG/1
300 CAPSULE ORAL 2 TIMES DAILY
Qty: 14 CAPSULE | Refills: 0 | Status: SHIPPED | OUTPATIENT
Start: 2024-06-03 | End: 2024-06-10

## 2024-06-03 RX ADMIN — CEFTRIAXONE 1000 MG: 1 INJECTION, POWDER, FOR SOLUTION INTRAMUSCULAR; INTRAVENOUS at 16:59

## 2024-06-03 NOTE — CARE COORDINATION
-ACM attempted to reach patient on scheduled date to complete Care Coordination enrollment, however no answer.    -HIPPA compliant voice message left introducing self with contact information, requesting a return call.   -If no return call, ACM will attempt outreach again.

## 2024-06-03 NOTE — PROGRESS NOTES
smokeless tobacco. She reports current alcohol use. She reports that she does not use drugs.  Family History: family history includes Alcohol Abuse in her father; Breast Cancer in her sister; Cancer in her sister; Diabetes Type 1  in her mother; Heart Attack (age of onset: 67) in her father; High Blood Pressure in her sister; No Known Problems in her brother; Stroke in her mother.   Allergies: Macrobid [nitrofurantoin macrocrystal]    Unless otherwise stated in this report the patient's positive and negative responses for review of systems for constitutional, eyes, ENT, cardiovascular, respiratory, gastrointestinal, neurological, , musculoskeletal, and integument systems and related systems to the presenting problem are either stated in the history of present illness or were not pertinent or were negative for the symptoms and/or complaints related to the presenting medical problem.  Positives and pertinent negatives as per HPI.  All others reviewed and are negative.    Physical Exam   VS:    Vitals:    06/03/24 1639   BP: 132/68   Pulse: 95   Temp: 98.6 °F (37 °C)   SpO2: 94%  Comment: 3 L via NC   Weight: 106.6 kg (235 lb)   Height: 1.473 m (4' 9.99\")     Oxygen Saturation Interpretation: Normal.    Constitutional:  Alert, development consistent with age. NAD.  Head:  NC/NT. Airway patent.  Moderate TTP over maxillary and frontal sinuses.   Ear: Bilateral external auditory ear canals are clear there is no cerumen, erythema or debris present.  Bilateral tympanic membrane intact, translucent and normal cone of light.  There is no serous effusion or drainage present.  Nose: Bilateral turbinates are swollen.  No septal deviation.  Rhinorrhea present.  Mouth: Posterior pharynx with mild erythema and clear postnasal drip. No tonsillar hypertrophy or exudate.   Neck:  Normal ROM. Supple. No anterior cervical adenopathy noted.  Lungs: End expiratory wheezes with rhonchi heard in bilateral upper and lower lobes.  CV:

## 2024-06-04 ENCOUNTER — CARE COORDINATION (OUTPATIENT)
Dept: CARE COORDINATION | Age: 69
End: 2024-06-04

## 2024-06-04 NOTE — CARE COORDINATION
-[facility]'s second attempted to reach patient to offer enrollment into Care Coordination program & RPM services, however no answer.  -HIPAA compliant VM left introducing self, reason for call, and brief explanation of program.  -Left [facility]'s contact information, requesting call back. If no return call, will attempt outreach again.  -Will mail Introduction letter to patient.

## 2024-06-11 ENCOUNTER — CARE COORDINATION (OUTPATIENT)
Dept: CARE COORDINATION | Age: 69
End: 2024-06-11

## 2024-06-11 NOTE — CARE COORDINATION
-[facility]'s third attempted to reach patient to offer enrollment into Care Coordination program & RPM services after Introduction letter mailed last week, however no answer.  -HIPAA compliant VM left introducing self, reason for call, and brief explanation of program.  -Left [facility]'s contact information, requesting call back.   Plan  If no return call, will attempt outreach again.

## 2024-06-18 ENCOUNTER — CARE COORDINATION (OUTPATIENT)
Dept: CARE COORDINATION | Age: 69
End: 2024-06-18

## 2024-06-18 NOTE — CARE COORDINATION
-[facility]'s forth and final attempt to reach patient  to offer enrollment into Care Coordination program & RPM services, however no answer.  -HIPAA compliant VM left introducing self, reason for call, brief explanation of program and ACM final outreach to offer enrollment to patient.  -Patient encouraged to contact [facility] or inform PCP if she should change her mind.    -Left AC's contact information.  -[facility] will remove name from care team if no return call by end of today.

## 2024-06-23 DIAGNOSIS — E78.2 MIXED HYPERLIPIDEMIA: ICD-10-CM

## 2024-06-23 NOTE — TELEPHONE ENCOUNTER
Patients last appointment 4/12/2024.  Patients next scheduled appointment   Future Appointments   Date Time Provider Department Center   6/25/2024  4:00 PM Brandyn Ybarra DPM [facility]   7/2/2024  2:15 PM Kevin Montanez MD [place] [facility]   8/1/2024  1:30 PM Kya Avelar, APRN - NP [facility]   10/9/2024  1:15 PM Adi Sesay DO [facility]   10/22/2024 10:00 AM Alex Seals MD BDM ENDO [facility]

## 2024-06-24 RX ORDER — ROSUVASTATIN CALCIUM 10 MG/1
10 TABLET, COATED ORAL DAILY
Qty: 90 TABLET | Refills: 5 | Status: SHIPPED | OUTPATIENT
Start: 2024-06-24

## 2024-06-25 ENCOUNTER — PROCEDURE VISIT (OUTPATIENT)
Dept: PODIATRY | Age: 69
End: 2024-06-25
Payer: MEDICARE

## 2024-06-25 VITALS — TEMPERATURE: 97.8 F | BODY MASS INDEX: 49.13 KG/M2 | WEIGHT: 235 LBS

## 2024-06-25 DIAGNOSIS — B35.1 TINEA UNGUIUM: ICD-10-CM

## 2024-06-25 DIAGNOSIS — M79.675 PAIN IN LEFT TOE(S): ICD-10-CM

## 2024-06-25 DIAGNOSIS — L60.0 INGROWN NAIL: Primary | ICD-10-CM

## 2024-06-25 PROCEDURE — 99213 OFFICE O/P EST LOW 20 MIN: CPT | Performed by: PODIATRIST

## 2024-06-25 PROCEDURE — 1123F ACP DISCUSS/DSCN MKR DOCD: CPT | Performed by: PODIATRIST

## 2024-06-25 PROCEDURE — 11750 EXCISION NAIL&NAIL MATRIX: CPT | Performed by: PODIATRIST

## 2024-06-25 RX ORDER — HYDROCODONE BITARTRATE AND ACETAMINOPHEN 5; 325 MG/1; MG/1
1 TABLET ORAL EVERY 6 HOURS PRN
COMMUNITY
Start: 2024-05-23

## 2024-06-25 RX ORDER — LIDOCAINE HYDROCHLORIDE 20 MG/ML
3 INJECTION, SOLUTION EPIDURAL; INFILTRATION; INTRACAUDAL; PERINEURAL ONCE
Status: COMPLETED | OUTPATIENT
Start: 2024-06-25 | End: 2024-06-25

## 2024-06-25 RX ADMIN — LIDOCAINE HYDROCHLORIDE 3 ML: 20 INJECTION, SOLUTION EPIDURAL; INFILTRATION; INTRACAUDAL; PERINEURAL at 16:35

## 2024-06-25 NOTE — PATIENT INSTRUCTIONS
Starting day #2 soak foot in 1 Tablespoon of epsom salt and warm water for 15 minutes per day until Dr Ybarra tells you to stop soaking.    Apply Neosporin or triple antibiotic ointment and a Band-Aid.     Any Questions fell free to contact Shirlene MARIA at [phone]

## 2024-06-25 NOTE — PROGRESS NOTES
[facility] [place] [facility]  [address] [address]  Dept: [phone]  Dept Fax: [phone]  Loc: [phone]     INGROWN TOENAIL NOTE  Date of patient's visit: 6/26/2024  Patient's Name:  Leny Amador YOB: 1955            Patient Care Team:  Adi Sesay DO as PCP - General (Family Medicine)  Adi Sesay DO as PCP - Empaneled Provider  Mason Shi MD as Consulting Physician (Pulmonology)  Freddy Chapin MD as Consulting Physician (Pulmonology)  Kevin Montanez MD as Consulting Physician (Cardiology)  Brandyn Ybarra DPM as Consulting Physician (Podiatry)      Chief Complaint   Patient presents with    Toe Pain     Adi Sesay DO  6/23/2024    Diabetes        Leny Page 69 y.o. female that presents complaining of a painful ingrown toenail on the 1st Left toes. Conservative therapy has failed.    Symptoms began 10 year(s) ago.  Patient relates pain is Present.  Pain is rated 10 out of 10 and is described as intermittent.  Treatments prior to today's visit include: .  Currently denies F/C/N/V.     Allergies   Allergen Reactions    Macrobid [Nitrofurantoin Macrocrystal] Other (See Comments)     Exacerbates asthma       Past Medical History:   Diagnosis Date    Anxiety     Arthritis     Asthma     COPD (chronic obstructive pulmonary disease) ([facility])     uses O2 NC 2 L continuous     Depression     Diabetes mellitus ([facility])     Edema leg     GERD (gastroesophageal reflux disease)     Hypertension     Hypothyroidism     Leukocytosis     Obesity     Osteoarthritis     Sleep apnea     no CPAP     Thyroid disease     Type 2 diabetes mellitus without complication ([facility])        Prior to Admission medications    Medication Sig Start Date End Date Taking? Authorizing Provider   HYDROcodone-acetaminophen (NORCO) 5-325 MG per tablet Take 1 tablet by mouth every 6 hours as needed. Max Daily Amount: 4 tablets

## 2024-06-26 PROCEDURE — NBSRV NON-BILLABLE SERVICE: Performed by: PODIATRIST

## 2024-06-26 RX ORDER — LIDOCAINE HYDROCHLORIDE 20 MG/ML
3 INJECTION, SOLUTION INFILTRATION; PERINEURAL ONCE
Status: COMPLETED | OUTPATIENT
Start: 2024-06-26 | End: 2024-06-26

## 2024-06-26 RX ORDER — LIDOCAINE HYDROCHLORIDE 20 MG/ML
3 INJECTION, SOLUTION INFILTRATION; PERINEURAL ONCE
Status: DISCONTINUED | OUTPATIENT
Start: 2024-06-26 | End: 2024-06-26

## 2024-06-26 RX ADMIN — LIDOCAINE HYDROCHLORIDE 3 ML: 20 INJECTION, SOLUTION INFILTRATION; PERINEURAL at 15:50

## 2024-07-24 DIAGNOSIS — M19.011 PRIMARY OSTEOARTHRITIS OF RIGHT SHOULDER: ICD-10-CM

## 2024-07-24 RX ORDER — CELECOXIB 200 MG/1
200 CAPSULE ORAL DAILY
Qty: 90 CAPSULE | Refills: 3 | Status: SHIPPED | OUTPATIENT
Start: 2024-07-24

## 2024-07-24 NOTE — TELEPHONE ENCOUNTER
Patients last appointment 4/12/2024.  Patients next scheduled appointment   Future Appointments   Date Time Provider Department Center   8/1/2024  1:30 PM Kya Avelar APRN - NP [facility]   8/20/2024  1:45 PM Kevin Montanez MD [place] [facility]   9/24/2024  3:00 PM Brandyn Ybarra DPM [facility]   10/9/2024  1:15 PM Adi Sesay DO [facility]   10/22/2024 10:00 AM Alex Seals MD BDM ENDO [facility]

## 2024-08-20 ENCOUNTER — OFFICE VISIT (OUTPATIENT)
Dept: CARDIOLOGY CLINIC | Age: 69
End: 2024-08-20
Payer: MEDICARE

## 2024-08-20 VITALS
DIASTOLIC BLOOD PRESSURE: 71 MMHG | SYSTOLIC BLOOD PRESSURE: 138 MMHG | HEART RATE: 90 BPM | BODY MASS INDEX: 47.41 KG/M2 | RESPIRATION RATE: 18 BRPM | WEIGHT: 235.2 LBS | HEIGHT: 59 IN

## 2024-08-20 DIAGNOSIS — I10 ESSENTIAL HYPERTENSION: Primary | Chronic | ICD-10-CM

## 2024-08-20 DIAGNOSIS — R06.09 DOE (DYSPNEA ON EXERTION): ICD-10-CM

## 2024-08-20 DIAGNOSIS — I45.10 RBBB: ICD-10-CM

## 2024-08-20 PROCEDURE — 3075F SYST BP GE 130 - 139MM HG: CPT | Performed by: INTERNAL MEDICINE

## 2024-08-20 PROCEDURE — 3078F DIAST BP <80 MM HG: CPT | Performed by: INTERNAL MEDICINE

## 2024-08-20 PROCEDURE — 99213 OFFICE O/P EST LOW 20 MIN: CPT | Performed by: INTERNAL MEDICINE

## 2024-08-20 PROCEDURE — 1123F ACP DISCUSS/DSCN MKR DOCD: CPT | Performed by: INTERNAL MEDICINE

## 2024-08-20 PROCEDURE — 93000 ELECTROCARDIOGRAM COMPLETE: CPT | Performed by: INTERNAL MEDICINE

## 2024-08-20 NOTE — PROGRESS NOTES
TRIG 159 (H) 2023    TRIG 151 (H) 10/20/2022     Lab Results   Component Value Date    HDL 42 2024    HDL 51 2023    HDL 46 10/20/2022     No components found for: \"LDLCALC\", \"LDLCHOLESTEROL\"    Lab Results   Component Value Date    VLDL 51 2024    VLDL 32 2023    VLDL 30 10/20/2022     No results found for: \"CHOLHDLRATIO\"  No results for input(s): \"PROBNP\" in the last 72 hours.    Cardiac Tests:  EC2024: Sinus rhythm 90 bpm right bundle branch block QRS duration 120 ms    Echocardiogram:   TTE 3/7/2022       Summary   Technically difficult study - limited visualization.   Tachycardia noted throughout study.      Normal left ventricular size and systolic function.   Ejection fraction is visually estimated at 65-70%.   Indeterminate diastolic function.   No regional wall motion abnormalities seen, but endocardial border not   well visualized and contrast agent not used.   Normal right ventricular size and function.   No significant valvular abnormalities.   Unable to estimate RSVP    Stress test:      Cardiac catheterization:     Orders Placed This Encounter   Procedures    EKG 12 lead        Requested Prescriptions      No prescriptions requested or ordered in this encounter        ASSESSMENT / PLAN:  Intermittent sinus tachycardia.  Likely driven by hypoxia  Dyspnea with exertion, multifactorial.  COPD/obesity/probable pulmonary hypertension/deconditioning  Right bundle branch block, chronic  Hypertension, well controlled  Type 2 diabetes, insulin requiring, A1c 8.4 %  COPD/emphysema with chronic hypoxia on continuous O2  SAROJ BiPAP with O2  Hypothyroidism  Morbid obesity, BMI 48 kg/m²  Osteoarthritis  Depression  GERD    Recommendations:  Stable from a cardiac standpoint.      No specific cardiac treatment required at this time  Explained risk of atrial fibrillation and HFpEF given comorbidities  Continue statin and current antihypertensives HCTZ; both losartan and valsartan

## 2024-08-21 ENCOUNTER — TELEPHONE (OUTPATIENT)
Dept: CARDIOLOGY CLINIC | Age: 69
End: 2024-08-21

## 2024-08-21 NOTE — TELEPHONE ENCOUNTER
----- Message from Dr. Kevin Montanez MD sent at 8/20/2024  4:54 PM EDT -----  Please clarify that patient is not taking both losartan and valsartan; should only be taking one of those not both

## 2024-08-28 DIAGNOSIS — J43.9 PULMONARY EMPHYSEMA, UNSPECIFIED EMPHYSEMA TYPE (HCC): Chronic | ICD-10-CM

## 2024-08-28 RX ORDER — ALBUTEROL SULFATE 90 UG/1
AEROSOL, METERED RESPIRATORY (INHALATION)
Qty: 25.5 G | Refills: 12 | Status: SHIPPED
Start: 2024-08-28 | End: 2024-08-28 | Stop reason: SDUPTHER

## 2024-08-28 RX ORDER — ALBUTEROL SULFATE 90 UG/1
INHALANT RESPIRATORY (INHALATION)
Qty: 25.5 G | Refills: 12 | Status: CANCELLED | OUTPATIENT
Start: 2024-08-28

## 2024-08-28 RX ORDER — ALBUTEROL SULFATE 90 UG/1
INHALANT RESPIRATORY (INHALATION)
Qty: 25.5 G | Refills: 12 | Status: SHIPPED | OUTPATIENT
Start: 2024-08-28

## 2024-09-05 NOTE — TELEPHONE ENCOUNTER
Patients last appointment 4/12/2024.  Patients next scheduled appointment   Future Appointments   Date Time Provider Department Center   9/24/2024  3:00 PM Brandyn Ybarra DPM [facility]   10/9/2024  1:15 PM Adi Sesay DO [facility]   10/22/2024 10:00 AM Alex Seals MD [facility] ENDO [facility]   2/27/2025  2:00 PM Kya Avelar, ERIK - NP AFL PULM CC [facility]

## 2024-09-29 DIAGNOSIS — Z79.4 TYPE 2 DIABETES MELLITUS WITHOUT COMPLICATION, WITH LONG-TERM CURRENT USE OF INSULIN (HCC): ICD-10-CM

## 2024-09-29 DIAGNOSIS — E11.9 TYPE 2 DIABETES MELLITUS WITHOUT COMPLICATION, WITH LONG-TERM CURRENT USE OF INSULIN (HCC): ICD-10-CM

## 2024-09-29 RX ORDER — INSULIN ASPART 100 [IU]/ML
INJECTION, SOLUTION INTRAVENOUS; SUBCUTANEOUS
Qty: 10 ADJUSTABLE DOSE PRE-FILLED PEN SYRINGE | Refills: 3 | Status: SHIPPED | OUTPATIENT
Start: 2024-09-29

## 2024-10-01 DIAGNOSIS — E11.9 TYPE 2 DIABETES MELLITUS WITHOUT COMPLICATION, WITH LONG-TERM CURRENT USE OF INSULIN (HCC): ICD-10-CM

## 2024-10-01 DIAGNOSIS — Z79.4 TYPE 2 DIABETES MELLITUS WITHOUT COMPLICATION, WITH LONG-TERM CURRENT USE OF INSULIN (HCC): ICD-10-CM

## 2024-10-01 RX ORDER — INSULIN ASPART 100 [IU]/ML
INJECTION, SOLUTION INTRAVENOUS; SUBCUTANEOUS
Qty: 10 ADJUSTABLE DOSE PRE-FILLED PEN SYRINGE | Refills: 3 | Status: SHIPPED | OUTPATIENT
Start: 2024-10-01

## 2024-10-03 DIAGNOSIS — E03.9 ACQUIRED HYPOTHYROIDISM: ICD-10-CM

## 2024-10-03 DIAGNOSIS — E11.42 TYPE 2 DIABETES MELLITUS WITH DIABETIC POLYNEUROPATHY, WITH LONG-TERM CURRENT USE OF INSULIN (HCC): ICD-10-CM

## 2024-10-03 DIAGNOSIS — N18.31 STAGE 3A CHRONIC KIDNEY DISEASE (HCC): ICD-10-CM

## 2024-10-03 DIAGNOSIS — M81.0 AGE-RELATED OSTEOPOROSIS WITHOUT CURRENT PATHOLOGICAL FRACTURE: ICD-10-CM

## 2024-10-03 DIAGNOSIS — E78.2 MIXED HYPERLIPIDEMIA: ICD-10-CM

## 2024-10-03 DIAGNOSIS — Z79.4 TYPE 2 DIABETES MELLITUS WITH DIABETIC POLYNEUROPATHY, WITH LONG-TERM CURRENT USE OF INSULIN (HCC): ICD-10-CM

## 2024-10-03 DIAGNOSIS — E53.8 VITAMIN B 12 DEFICIENCY: ICD-10-CM

## 2024-10-03 LAB
ALBUMIN: 4 G/DL (ref 3.5–5.2)
ALP BLD-CCNC: 82 U/L (ref 35–104)
ALT SERPL-CCNC: 15 U/L (ref 0–32)
ANION GAP SERPL CALCULATED.3IONS-SCNC: 17 MMOL/L (ref 7–16)
AST SERPL-CCNC: 19 U/L (ref 0–31)
BACTERIA: ABNORMAL
BASOPHILS ABSOLUTE: 0.1 K/UL (ref 0–0.2)
BASOPHILS RELATIVE PERCENT: 1 % (ref 0–2)
BILIRUB SERPL-MCNC: 0.2 MG/DL (ref 0–1.2)
BILIRUBIN, URINE: NEGATIVE
BUN BLDV-MCNC: 21 MG/DL (ref 6–23)
CALCIUM SERPL-MCNC: 9.5 MG/DL (ref 8.6–10.2)
CHLORIDE BLD-SCNC: 96 MMOL/L (ref 98–107)
CO2: 24 MMOL/L (ref 22–29)
COLOR, UA: YELLOW
CREAT SERPL-MCNC: 1 MG/DL (ref 0.5–1)
CREATININE URINE: 99.3 MG/DL (ref 29–226)
CRYSTALS, UA: ABNORMAL /HPF
EOSINOPHILS ABSOLUTE: 0.39 K/UL (ref 0.05–0.5)
EOSINOPHILS RELATIVE PERCENT: 4 % (ref 0–6)
EPITHELIAL CELLS, UA: ABNORMAL /HPF
GFR, ESTIMATED: 63 ML/MIN/1.73M2
GLUCOSE BLD-MCNC: 124 MG/DL (ref 74–99)
GLUCOSE URINE: NEGATIVE MG/DL
HBA1C MFR BLD: 6.9 % (ref 4–5.6)
HCT VFR BLD CALC: 41.3 % (ref 34–48)
HEMOGLOBIN: 12.4 G/DL (ref 11.5–15.5)
IMMATURE GRANULOCYTES %: 1 % (ref 0–5)
IMMATURE GRANULOCYTES ABSOLUTE: 0.1 K/UL (ref 0–0.58)
KETONES, URINE: NEGATIVE MG/DL
LEUKOCYTE ESTERASE, URINE: ABNORMAL
LYMPHOCYTES ABSOLUTE: 3.03 K/UL (ref 1.5–4)
LYMPHOCYTES RELATIVE PERCENT: 29 % (ref 20–42)
MCH RBC QN AUTO: 25.7 PG (ref 26–35)
MCHC RBC AUTO-ENTMCNC: 30 G/DL (ref 32–34.5)
MCV RBC AUTO: 85.5 FL (ref 80–99.9)
MICROALBUMIN/CREAT 24H UR: <12 MG/L (ref 0–19)
MICROALBUMIN/CREAT UR-RTO: NORMAL MCG/MG CREAT (ref 0–30)
MONOCYTES ABSOLUTE: 0.66 K/UL (ref 0.1–0.95)
MONOCYTES RELATIVE PERCENT: 6 % (ref 2–12)
NEUTROPHILS ABSOLUTE: 6.04 K/UL (ref 1.8–7.3)
NEUTROPHILS RELATIVE PERCENT: 58 % (ref 43–80)
NITRITE, URINE: NEGATIVE
PDW BLD-RTO: 15.5 % (ref 11.5–15)
PH, URINE: 6 (ref 5–9)
PLATELET, FLUORESCENCE: 226 K/UL (ref 130–450)
PMV BLD AUTO: 13 FL (ref 7–12)
POTASSIUM SERPL-SCNC: 4.3 MMOL/L (ref 3.5–5)
PROTEIN UA: NEGATIVE MG/DL
RBC # BLD: 4.83 M/UL (ref 3.5–5.5)
RBC UA: ABNORMAL /HPF
SODIUM BLD-SCNC: 137 MMOL/L (ref 132–146)
SPECIFIC GRAVITY UA: 1.02 (ref 1–1.03)
THYROXINE (T4): 8.7 UG/DL (ref 4.5–11.7)
TOTAL PROTEIN: 6.9 G/DL (ref 6.4–8.3)
TSH SERPL DL<=0.05 MIU/L-ACNC: 3.61 UIU/ML (ref 0.27–4.2)
TURBIDITY: CLEAR
URINE HGB: NEGATIVE
UROBILINOGEN, URINE: 0.2 EU/DL (ref 0–1)
VITAMIN B-12: 1282 PG/ML (ref 211–946)
WBC # BLD: 10.3 K/UL (ref 4.5–11.5)
WBC UA: ABNORMAL /HPF

## 2024-10-04 LAB — VITAMIN D 25-HYDROXY: 84.5 NG/ML (ref 30–100)

## 2024-10-09 ENCOUNTER — OFFICE VISIT (OUTPATIENT)
Dept: PRIMARY CARE CLINIC | Age: 69
End: 2024-10-09

## 2024-10-09 VITALS
RESPIRATION RATE: 17 BRPM | OXYGEN SATURATION: 91 % | SYSTOLIC BLOOD PRESSURE: 138 MMHG | WEIGHT: 240.4 LBS | BODY MASS INDEX: 48.55 KG/M2 | DIASTOLIC BLOOD PRESSURE: 83 MMHG | HEART RATE: 100 BPM | TEMPERATURE: 97.9 F

## 2024-10-09 DIAGNOSIS — M19.042 PRIMARY OSTEOARTHRITIS OF BOTH HANDS: ICD-10-CM

## 2024-10-09 DIAGNOSIS — G47.33 SLEEP APNEA, OBSTRUCTIVE: ICD-10-CM

## 2024-10-09 DIAGNOSIS — M19.041 PRIMARY OSTEOARTHRITIS OF BOTH HANDS: ICD-10-CM

## 2024-10-09 DIAGNOSIS — E03.9 ACQUIRED HYPOTHYROIDISM: ICD-10-CM

## 2024-10-09 DIAGNOSIS — E53.8 VITAMIN B 12 DEFICIENCY: ICD-10-CM

## 2024-10-09 DIAGNOSIS — E11.42 TYPE 2 DIABETES MELLITUS WITH DIABETIC POLYNEUROPATHY, WITH LONG-TERM CURRENT USE OF INSULIN (HCC): ICD-10-CM

## 2024-10-09 DIAGNOSIS — I10 ESSENTIAL HYPERTENSION: Chronic | ICD-10-CM

## 2024-10-09 DIAGNOSIS — E55.9 VITAMIN D DEFICIENCY: ICD-10-CM

## 2024-10-09 DIAGNOSIS — J44.9 COPD WITHOUT EXACERBATION (HCC): Chronic | ICD-10-CM

## 2024-10-09 DIAGNOSIS — Z23 NEED FOR INFLUENZA VACCINATION: ICD-10-CM

## 2024-10-09 DIAGNOSIS — J43.8 OTHER EMPHYSEMA (HCC): Primary | ICD-10-CM

## 2024-10-09 DIAGNOSIS — Z79.4 TYPE 2 DIABETES MELLITUS WITH DIABETIC POLYNEUROPATHY, WITH LONG-TERM CURRENT USE OF INSULIN (HCC): ICD-10-CM

## 2024-10-09 SDOH — ECONOMIC STABILITY: FOOD INSECURITY: WITHIN THE PAST 12 MONTHS, THE FOOD YOU BOUGHT JUST DIDN'T LAST AND YOU DIDN'T HAVE MONEY TO GET MORE.: NEVER TRUE

## 2024-10-09 SDOH — ECONOMIC STABILITY: FOOD INSECURITY: WITHIN THE PAST 12 MONTHS, YOU WORRIED THAT YOUR FOOD WOULD RUN OUT BEFORE YOU GOT MONEY TO BUY MORE.: NEVER TRUE

## 2024-10-09 SDOH — ECONOMIC STABILITY: INCOME INSECURITY: HOW HARD IS IT FOR YOU TO PAY FOR THE VERY BASICS LIKE FOOD, HOUSING, MEDICAL CARE, AND HEATING?: NOT HARD AT ALL

## 2024-10-09 ASSESSMENT — PATIENT HEALTH QUESTIONNAIRE - PHQ9
2. FEELING DOWN, DEPRESSED OR HOPELESS: NOT AT ALL
SUM OF ALL RESPONSES TO PHQ9 QUESTIONS 1 & 2: 0
1. LITTLE INTEREST OR PLEASURE IN DOING THINGS: NOT AT ALL
SUM OF ALL RESPONSES TO PHQ QUESTIONS 1-9: 0

## 2024-10-09 ASSESSMENT — ENCOUNTER SYMPTOMS
EYES NEGATIVE: 1
GASTROINTESTINAL NEGATIVE: 1
RESPIRATORY NEGATIVE: 1
ALLERGIC/IMMUNOLOGIC NEGATIVE: 1
BACK PAIN: 1

## 2024-10-22 ENCOUNTER — OFFICE VISIT (OUTPATIENT)
Dept: ENDOCRINOLOGY | Age: 69
End: 2024-10-22

## 2024-10-22 ENCOUNTER — FOLLOWUP TELEPHONE ENCOUNTER (OUTPATIENT)
Dept: ENDOCRINOLOGY | Age: 69
End: 2024-10-22

## 2024-10-22 VITALS
DIASTOLIC BLOOD PRESSURE: 66 MMHG | SYSTOLIC BLOOD PRESSURE: 152 MMHG | RESPIRATION RATE: 18 BRPM | HEIGHT: 58 IN | BODY MASS INDEX: 50.38 KG/M2 | HEART RATE: 114 BPM | WEIGHT: 240 LBS

## 2024-10-22 DIAGNOSIS — E03.9 HYPOTHYROIDISM, UNSPECIFIED TYPE: ICD-10-CM

## 2024-10-22 DIAGNOSIS — Z91.119 DIETARY NONCOMPLIANCE: ICD-10-CM

## 2024-10-22 DIAGNOSIS — Z79.4 TYPE 2 DIABETES MELLITUS WITH HYPERGLYCEMIA, WITH LONG-TERM CURRENT USE OF INSULIN (HCC): Primary | ICD-10-CM

## 2024-10-22 DIAGNOSIS — E11.65 TYPE 2 DIABETES MELLITUS WITH HYPERGLYCEMIA, WITH LONG-TERM CURRENT USE OF INSULIN (HCC): Primary | ICD-10-CM

## 2024-10-22 RX ORDER — SEMAGLUTIDE 1.34 MG/ML
INJECTION, SOLUTION SUBCUTANEOUS
Qty: 3 ML | Refills: 11 | Status: SHIPPED | OUTPATIENT
Start: 2024-10-22

## 2024-10-22 RX ORDER — INSULIN GLARGINE 100 [IU]/ML
20 INJECTION, SOLUTION SUBCUTANEOUS NIGHTLY
Qty: 16 ADJUSTABLE DOSE PRE-FILLED PEN SYRINGE | Refills: 5
Start: 2024-10-22

## 2024-10-22 RX ORDER — INSULIN ASPART 100 [IU]/ML
INJECTION, SOLUTION INTRAVENOUS; SUBCUTANEOUS
Qty: 10 ADJUSTABLE DOSE PRE-FILLED PEN SYRINGE | Refills: 3 | Status: SHIPPED | OUTPATIENT
Start: 2024-10-22

## 2024-10-22 NOTE — PATIENT INSTRUCTIONS
Recommendations for today's visit  Increase Ozempic to 1 mg weekly  Continue Metformin 500 mg 2 tablet twice daily   Change Basaglar to 20 units nightly   Take Novolog 10 units with meals plus the following sliding   Blood sugar 150-200: add 2 Units of Novolog   Blood sugar 201-250 : Add 4 Units of Novolog   Blood Sugar 251 - 300: Add 6 Units of Novolog   Blood Sugar 301-350: Add 8  Units of Novolog   Blood Sugar 350-400: Add 10 Units of Novolog   Blood sugar  >400: Add 12 Units of Novolog     Check Blood sugar 4 times/day before meals and at bedtime and send us sugar log in a week      These are your blood sugar, blood pressure, cholesterol and A1c goals:  Blood sugar fastin mg/dl to 130 mg/dl  Blood sugar before meals: <150 mg/dl  Peak blood sugar lower than 180 mg/dl  A1c: between 6.5 - 7.5%    I you have any questions please call Dr. Seals's office     Alex Seals MD  Endocrinologist, [address], [facility], [facility]   Phone: [phone]  Fax: [phone]

## 2024-10-22 NOTE — TELEPHONE ENCOUNTER
Met with patient in endo office for diabetes education. She has had DM many years, attended DE classes in 2021. Has recently made diet changes and lowered her A1C from 8.4% (April) to 6.9% (Oct). Patient states she has been eating more salads/vegetables and trying to watch her portion sizes. Per recall, normally eats 2 meals/day, admits to snacking at night. Discussed small healthy snacks and gave a list of ideas. Also provided low sodium nutrition therapy 2/2 stage III CKD. F/u as needed

## 2024-10-22 NOTE — PROGRESS NOTES
[facility] [place] [facility]  [address]  [facility] [address]  Dept: [phone]  Loc: [phone]   Date of Service: 10/22/2024  Primary Care Physician: Adi Sesay DO  Referring physician: Adi Sesay DO  Provider: Alex Seals MD    Reason for the visit:  DM type 2     History of Present Illness:  The history is provided by the patient. No  was used. Accuracy of the patient data is excellent.  Leny Amador is a very pleasant 69 y.o. female seen today for diabetes management     Leny Amador was diagnosed with diabetes long time ago Ozempic 0.5 mg/wk. Basaglar 25 units nightly, Novolog scale, metformin 1000 mg twice daily  The patient has been checking blood sugar freestyle Kartik -2   Most recent A1c results summarized below  Lab Results   Component Value Date/Time    LABA1C 6.9 10/03/2024 11:18 AM    LABA1C 8.4 04/05/2024 12:46 PM    LABA1C 6.2 03/20/2023 10:21 AM     Patient has had no hypoglycemic episodes   The patient hasn't been mindful of what has been eating and wasn't following diabetes diet    I reviewed current medications and the patient has no issues with diabetes medications  Leny Amador is up to date with eye exam and denied any history of diabetic retinopathy   The patient performs her own feet care, seen podiatry every 6 mnths   Microvascular complications:  No Retinopathy, +  Neuropathy   Macrovascular complications: no CAD,  or Stroke  The patient receives Flushot every year     PAST MEDICAL HISTORY   Past Medical History:   Diagnosis Date    Anxiety     Arthritis     Asthma     COPD (chronic obstructive pulmonary disease) (HCC)     uses O2 NC 2 L continuous     Depression     Diabetes mellitus (HCC)     Edema leg     GERD (gastroesophageal reflux disease)     Hypertension     Hypothyroidism     Leukocytosis     Obesity     Osteoarthritis     Sleep apnea     no CPAP     Thyroid disease     Type 2

## 2024-11-12 DIAGNOSIS — J43.9 PULMONARY EMPHYSEMA, UNSPECIFIED EMPHYSEMA TYPE (HCC): Chronic | ICD-10-CM

## 2024-11-13 ENCOUNTER — PROCEDURE VISIT (OUTPATIENT)
Dept: PODIATRY | Age: 69
End: 2024-11-13
Payer: MEDICARE

## 2024-11-13 VITALS
TEMPERATURE: 97.1 F | WEIGHT: 240 LBS | BODY MASS INDEX: 50.38 KG/M2 | HEIGHT: 58 IN | OXYGEN SATURATION: 92 % | HEART RATE: 102 BPM

## 2024-11-13 DIAGNOSIS — M79.674 PAIN IN RIGHT TOE(S): ICD-10-CM

## 2024-11-13 DIAGNOSIS — I73.9 PERIPHERAL VASCULAR DISEASE, UNSPECIFIED (HCC): ICD-10-CM

## 2024-11-13 DIAGNOSIS — Z79.4 TYPE 2 DIABETES MELLITUS WITHOUT COMPLICATION, WITH LONG-TERM CURRENT USE OF INSULIN (HCC): ICD-10-CM

## 2024-11-13 DIAGNOSIS — B35.1 TINEA UNGUIUM: Primary | ICD-10-CM

## 2024-11-13 DIAGNOSIS — R26.2 DIFFICULTY WALKING: ICD-10-CM

## 2024-11-13 DIAGNOSIS — M79.675 PAIN IN LEFT TOE(S): ICD-10-CM

## 2024-11-13 DIAGNOSIS — E11.9 TYPE 2 DIABETES MELLITUS WITHOUT COMPLICATION, WITH LONG-TERM CURRENT USE OF INSULIN (HCC): ICD-10-CM

## 2024-11-13 PROCEDURE — 11721 DEBRIDE NAIL 6 OR MORE: CPT | Performed by: PODIATRIST

## 2024-11-13 NOTE — PROGRESS NOTES
Left    Dystrophic Changes   [x] [x] [x] [x] [x] [x] [x] [x] [x] [x]  5 4 3 2 1 1 2 3 4 5                         Right                                        Left    Color   [x] [x] [x] [x] [x] [x] [x] [x] [x] [x]  5 4 3 2 1 1 2 3 4 5                          Right                                        Left    Incurvation/Ingrowin   [x] [x] [x] [x] [x] [x] [x] [x] [x] [x]  5 4 3 2 1 1 2 3 4 5                         Right                                        Left    Inflammation/Pain   [x] [x] [x] [x] [x] [x] [x] [x] [x] [x]  5 4 3 2 1 1 2 3 4 5                         Right                                        Left      Dermatologic Exam:  Skin lesion/ulceration negative.   Skin skin has no ulcer  Loss of hair  lower extremity      Musculoskeletal:   Hallux abductovalgus noted first digit right foot  1st MPJ ROM decreased, Bilateral.  Muscle Bilateral.  Pain present upon palpation of toenails 1 through 5 bilaterally extremely long thick pitting mycotic incurvated again skin, Bilateral. decreased medial longitudinal arch, Bilateral.  Ankle ROM decreased,Bilateral.    Dorsally contracted digits negative    Vascular: DP and PT pulses pulses are DP palpable PT absent CFT <3 seconds, Bilateral.  Hair growth absent to the level of the digits, Bilateral.  Edema negative bilateral.  Varicosities negative bilateral.     Neurological: Sensation diminshed to light touch to level of digits, Bilateral.  Protective sensation intact sites via 5.07/10g [place] Monofilament, Bilateral.  negative Tinel's, Bilateral.  negative Valleix sign, Bilateral.      Integument: nails   thickened > 3.0 mm, dystrophic and crumbly, discolored with subungual debris.    Toes cool to touch    Visual inspection:  Deformity: hammertoe deformity becca feet  amputation: absent    Edema:   Sensory exam:  Monofilament sensation:- 6/10 via SW 5.07/10g monofilament to the plantar foot bilateral feet    X-ray of the right

## 2024-11-14 RX ORDER — ALBUTEROL SULFATE 90 UG/1
INHALANT RESPIRATORY (INHALATION)
Qty: 25.5 G | Refills: 12 | Status: SHIPPED | OUTPATIENT
Start: 2024-11-14

## 2025-01-08 DIAGNOSIS — Z79.4 TYPE 2 DIABETES MELLITUS WITH HYPERGLYCEMIA, WITH LONG-TERM CURRENT USE OF INSULIN (HCC): ICD-10-CM

## 2025-01-08 DIAGNOSIS — E11.65 TYPE 2 DIABETES MELLITUS WITH HYPERGLYCEMIA, WITH LONG-TERM CURRENT USE OF INSULIN (HCC): ICD-10-CM

## 2025-01-08 RX ORDER — INSULIN ASPART 100 [IU]/ML
INJECTION, SOLUTION INTRAVENOUS; SUBCUTANEOUS
Qty: 63 ML | Refills: 0 | Status: SHIPPED | OUTPATIENT
Start: 2025-01-08

## 2025-02-10 DIAGNOSIS — I10 ESSENTIAL HYPERTENSION: Chronic | ICD-10-CM

## 2025-02-10 RX ORDER — VALSARTAN 40 MG/1
40 TABLET ORAL DAILY
Qty: 90 TABLET | Refills: 3 | Status: CANCELLED | OUTPATIENT
Start: 2025-02-10

## 2025-02-11 DIAGNOSIS — I10 ESSENTIAL HYPERTENSION: Chronic | ICD-10-CM

## 2025-02-11 RX ORDER — VALSARTAN 40 MG/1
40 TABLET ORAL DAILY
Qty: 90 TABLET | Refills: 3 | Status: SHIPPED | OUTPATIENT
Start: 2025-02-11

## 2025-02-11 NOTE — TELEPHONE ENCOUNTER
Name of Medication(s) Requested:  Requested Prescriptions      No prescriptions requested or ordered in this encounter       Medication is on current medication list Yes    Dosage and directions were verified? Yes    Quantity verified: 90 day supply     Pharmacy Verified?  Yes    Last Appointment:  10/9/2024    Future appts:  Future Appointments   Date Time Provider Department Center   2/25/2025 12:30 PM Alex Seals MD [facility] ENDO [facility]   2/27/2025  2:00 PM Kya Avelar, APRN - NP [facility]   3/19/2025  3:15 PM Brandyn Ybarra DPM [facility]   4/9/2025 12:45 PM Adi Sesay DO N [facility]        (If no appt send self scheduling link. .REFILLAPPT)  Scheduling request sent?     [] Yes  [x] No    Does patient need updated?  [] Yes  [x] No

## 2025-02-27 DIAGNOSIS — Z79.4 TYPE 2 DIABETES MELLITUS WITH HYPERGLYCEMIA, WITH LONG-TERM CURRENT USE OF INSULIN (HCC): ICD-10-CM

## 2025-02-27 DIAGNOSIS — Z79.4 TYPE 2 DIABETES MELLITUS WITHOUT COMPLICATION, WITH LONG-TERM CURRENT USE OF INSULIN (HCC): Chronic | ICD-10-CM

## 2025-02-27 DIAGNOSIS — E11.65 TYPE 2 DIABETES MELLITUS WITH HYPERGLYCEMIA, WITH LONG-TERM CURRENT USE OF INSULIN (HCC): ICD-10-CM

## 2025-02-27 DIAGNOSIS — E11.9 TYPE 2 DIABETES MELLITUS WITHOUT COMPLICATION, WITH LONG-TERM CURRENT USE OF INSULIN (HCC): Chronic | ICD-10-CM

## 2025-02-27 RX ORDER — INSULIN GLARGINE 100 [IU]/ML
20 INJECTION, SOLUTION SUBCUTANEOUS NIGHTLY
Qty: 18 ML | Refills: 1 | Status: CANCELLED | OUTPATIENT
Start: 2025-02-27

## 2025-02-27 RX ORDER — INSULIN GLARGINE 100 [IU]/ML
20 INJECTION, SOLUTION SUBCUTANEOUS NIGHTLY
Qty: 18 ML | Refills: 1 | Status: SHIPPED | OUTPATIENT
Start: 2025-02-27

## 2025-02-28 DIAGNOSIS — Z79.4 TYPE 2 DIABETES MELLITUS WITHOUT COMPLICATION, WITH LONG-TERM CURRENT USE OF INSULIN (HCC): Chronic | ICD-10-CM

## 2025-02-28 DIAGNOSIS — E11.9 TYPE 2 DIABETES MELLITUS WITHOUT COMPLICATION, WITH LONG-TERM CURRENT USE OF INSULIN (HCC): Chronic | ICD-10-CM

## 2025-02-28 RX ORDER — GABAPENTIN 300 MG/1
300 CAPSULE ORAL NIGHTLY
Qty: 90 CAPSULE | Refills: 3 | Status: SHIPPED | OUTPATIENT
Start: 2025-02-28 | End: 2026-02-28

## 2025-02-28 RX ORDER — GABAPENTIN 300 MG/1
300 CAPSULE ORAL NIGHTLY
Qty: 90 CAPSULE | Refills: 3 | OUTPATIENT
Start: 2025-02-28 | End: 2026-02-28

## 2025-03-03 ENCOUNTER — TELEPHONE (OUTPATIENT)
Dept: PRIMARY CARE CLINIC | Age: 70
End: 2025-03-03

## 2025-03-03 DIAGNOSIS — E11.65 TYPE 2 DIABETES MELLITUS WITH HYPERGLYCEMIA, WITH LONG-TERM CURRENT USE OF INSULIN (HCC): ICD-10-CM

## 2025-03-03 DIAGNOSIS — Z79.4 TYPE 2 DIABETES MELLITUS WITH HYPERGLYCEMIA, WITH LONG-TERM CURRENT USE OF INSULIN (HCC): ICD-10-CM

## 2025-03-03 NOTE — TELEPHONE ENCOUNTER
Pt asking for 90 day supply of the Basaglar kwickpen, she says she's not on ozempic anymore so she'd like to take 25u qhs now instead of 20

## 2025-03-04 RX ORDER — INSULIN GLARGINE 100 [IU]/ML
25 INJECTION, SOLUTION SUBCUTANEOUS NIGHTLY
Qty: 60 ML | Refills: 12 | Status: SHIPPED | OUTPATIENT
Start: 2025-03-04

## 2025-03-12 ENCOUNTER — OFFICE VISIT (OUTPATIENT)
Dept: PRIMARY CARE CLINIC | Age: 70
End: 2025-03-12

## 2025-03-12 VITALS
RESPIRATION RATE: 22 BRPM | OXYGEN SATURATION: 93 % | WEIGHT: 241 LBS | HEART RATE: 114 BPM | BODY MASS INDEX: 50.37 KG/M2 | TEMPERATURE: 97.3 F

## 2025-03-12 DIAGNOSIS — E11.42 TYPE 2 DIABETES MELLITUS WITH DIABETIC POLYNEUROPATHY, WITH LONG-TERM CURRENT USE OF INSULIN (HCC): ICD-10-CM

## 2025-03-12 DIAGNOSIS — R09.89 CHEST CONGESTION: ICD-10-CM

## 2025-03-12 DIAGNOSIS — Z79.4 TYPE 2 DIABETES MELLITUS WITH DIABETIC NEUROPATHY, WITH LONG-TERM CURRENT USE OF INSULIN (HCC): ICD-10-CM

## 2025-03-12 DIAGNOSIS — J20.8 ACUTE BRONCHITIS DUE TO OTHER SPECIFIED ORGANISMS: Primary | ICD-10-CM

## 2025-03-12 DIAGNOSIS — Z79.4 TYPE 2 DIABETES MELLITUS WITH DIABETIC POLYNEUROPATHY, WITH LONG-TERM CURRENT USE OF INSULIN (HCC): ICD-10-CM

## 2025-03-12 DIAGNOSIS — J44.9 COPD WITHOUT EXACERBATION (HCC): ICD-10-CM

## 2025-03-12 DIAGNOSIS — N18.31 STAGE 3A CHRONIC KIDNEY DISEASE (HCC): ICD-10-CM

## 2025-03-12 DIAGNOSIS — R60.0 EDEMA OF RIGHT LOWER EXTREMITY: ICD-10-CM

## 2025-03-12 DIAGNOSIS — E11.40 TYPE 2 DIABETES MELLITUS WITH DIABETIC NEUROPATHY, WITH LONG-TERM CURRENT USE OF INSULIN (HCC): ICD-10-CM

## 2025-03-12 LAB
INFLUENZA A ANTIBODY: NORMAL
INFLUENZA B ANTIBODY: NORMAL
Lab: NORMAL
PERFORMING INSTRUMENT: NORMAL
QC PASS/FAIL: NORMAL
SARS-COV-2, POC: NORMAL

## 2025-03-12 RX ORDER — METHYLPREDNISOLONE ACETATE 40 MG/ML
40 INJECTION, SUSPENSION INTRA-ARTICULAR; INTRALESIONAL; INTRAMUSCULAR; SOFT TISSUE ONCE
Status: COMPLETED | OUTPATIENT
Start: 2025-03-12 | End: 2025-03-12

## 2025-03-12 RX ORDER — CEFTRIAXONE 500 MG/1
500 INJECTION, POWDER, FOR SOLUTION INTRAMUSCULAR; INTRAVENOUS ONCE
Status: COMPLETED | OUTPATIENT
Start: 2025-03-12 | End: 2025-03-12

## 2025-03-12 RX ORDER — CEFDINIR 300 MG/1
300 CAPSULE ORAL 2 TIMES DAILY
Qty: 20 CAPSULE | Refills: 0 | Status: SHIPPED | OUTPATIENT
Start: 2025-03-12 | End: 2025-03-22

## 2025-03-12 RX ADMIN — CEFTRIAXONE 500 MG: 500 INJECTION, POWDER, FOR SOLUTION INTRAMUSCULAR; INTRAVENOUS at 14:38

## 2025-03-12 RX ADMIN — METHYLPREDNISOLONE ACETATE 40 MG: 40 INJECTION, SUSPENSION INTRA-ARTICULAR; INTRALESIONAL; INTRAMUSCULAR; SOFT TISSUE at 14:37

## 2025-03-12 SDOH — ECONOMIC STABILITY: FOOD INSECURITY: WITHIN THE PAST 12 MONTHS, YOU WORRIED THAT YOUR FOOD WOULD RUN OUT BEFORE YOU GOT MONEY TO BUY MORE.: SOMETIMES TRUE

## 2025-03-12 SDOH — ECONOMIC STABILITY: INCOME INSECURITY: IN THE LAST 12 MONTHS, WAS THERE A TIME WHEN YOU WERE NOT ABLE TO PAY THE MORTGAGE OR RENT ON TIME?: NO

## 2025-03-12 SDOH — ECONOMIC STABILITY: FOOD INSECURITY: WITHIN THE PAST 12 MONTHS, THE FOOD YOU BOUGHT JUST DIDN'T LAST AND YOU DIDN'T HAVE MONEY TO GET MORE.: SOMETIMES TRUE

## 2025-03-12 SDOH — ECONOMIC STABILITY: TRANSPORTATION INSECURITY
IN THE PAST 12 MONTHS, HAS THE LACK OF TRANSPORTATION KEPT YOU FROM MEDICAL APPOINTMENTS OR FROM GETTING MEDICATIONS?: NO

## 2025-03-12 ASSESSMENT — PATIENT HEALTH QUESTIONNAIRE - PHQ9
9. THOUGHTS THAT YOU WOULD BE BETTER OFF DEAD, OR OF HURTING YOURSELF: NOT AT ALL
1. LITTLE INTEREST OR PLEASURE IN DOING THINGS: SEVERAL DAYS
SUM OF ALL RESPONSES TO PHQ QUESTIONS 1-9: 6
5. POOR APPETITE OR OVEREATING: SEVERAL DAYS
6. FEELING BAD ABOUT YOURSELF - OR THAT YOU ARE A FAILURE OR HAVE LET YOURSELF OR YOUR FAMILY DOWN: SEVERAL DAYS
3. TROUBLE FALLING OR STAYING ASLEEP: NOT AT ALL
2. FEELING DOWN, DEPRESSED OR HOPELESS: SEVERAL DAYS
SUM OF ALL RESPONSES TO PHQ QUESTIONS 1-9: 6
5. POOR APPETITE OR OVEREATING: SEVERAL DAYS
6. FEELING BAD ABOUT YOURSELF - OR THAT YOU ARE A FAILURE OR HAVE LET YOURSELF OR YOUR FAMILY DOWN: SEVERAL DAYS
10. IF YOU CHECKED OFF ANY PROBLEMS, HOW DIFFICULT HAVE THESE PROBLEMS MADE IT FOR YOU TO DO YOUR WORK, TAKE CARE OF THINGS AT HOME, OR GET ALONG WITH OTHER PEOPLE: SOMEWHAT DIFFICULT
3. TROUBLE FALLING OR STAYING ASLEEP: NOT AT ALL
SUM OF ALL RESPONSES TO PHQ QUESTIONS 1-9: 6
SUM OF ALL RESPONSES TO PHQ QUESTIONS 1-9: 6
7. TROUBLE CONCENTRATING ON THINGS, SUCH AS READING THE NEWSPAPER OR WATCHING TELEVISION: NOT AT ALL
7. TROUBLE CONCENTRATING ON THINGS, SUCH AS READING THE NEWSPAPER OR WATCHING TELEVISION: NOT AT ALL
9. THOUGHTS THAT YOU WOULD BE BETTER OFF DEAD, OR OF HURTING YOURSELF: NOT AT ALL
4. FEELING TIRED OR HAVING LITTLE ENERGY: MORE THAN HALF THE DAYS
2. FEELING DOWN, DEPRESSED OR HOPELESS: SEVERAL DAYS
4. FEELING TIRED OR HAVING LITTLE ENERGY: MORE THAN HALF THE DAYS
SUM OF ALL RESPONSES TO PHQ QUESTIONS 1-9: 6
10. IF YOU CHECKED OFF ANY PROBLEMS, HOW DIFFICULT HAVE THESE PROBLEMS MADE IT FOR YOU TO DO YOUR WORK, TAKE CARE OF THINGS AT HOME, OR GET ALONG WITH OTHER PEOPLE: SOMEWHAT DIFFICULT
8. MOVING OR SPEAKING SO SLOWLY THAT OTHER PEOPLE COULD HAVE NOTICED. OR THE OPPOSITE, BEING SO FIGETY OR RESTLESS THAT YOU HAVE BEEN MOVING AROUND A LOT MORE THAN USUAL: NOT AT ALL
1. LITTLE INTEREST OR PLEASURE IN DOING THINGS: SEVERAL DAYS
8. MOVING OR SPEAKING SO SLOWLY THAT OTHER PEOPLE COULD HAVE NOTICED. OR THE OPPOSITE - BEING SO FIDGETY OR RESTLESS THAT YOU HAVE BEEN MOVING AROUND A LOT MORE THAN USUAL: NOT AT ALL

## 2025-03-12 ASSESSMENT — ENCOUNTER SYMPTOMS
COUGH: 1
GASTROINTESTINAL NEGATIVE: 1
ALLERGIC/IMMUNOLOGIC NEGATIVE: 1
SHORTNESS OF BREATH: 1
EYES NEGATIVE: 1
WHEEZING: 1

## 2025-03-12 NOTE — PROGRESS NOTES
3/12/25  Name: Leny Amador    : 1955    Sex: female    Age: 69 y.o.        Subjective:  Chief Complaint: Patient is here for cough matteo  wheezing     Set  4  d    pulm   gabve pred yesterday over phone  she to  styart yet dueto diab  She saysbs high  aroudn  140--150---she quti seeign dr bubba daly ozepmic  On o2 3 l  Pusle ox  95 home   walk  drop  to  75  On baslgar   25  f fauil  fu with endo  Sit lot more  Right leg swelling for   5 days  no pain  Se me two days            Review of Systems   Constitutional: Negative.    HENT: Negative.     Eyes: Negative.    Respiratory:  Positive for cough, shortness of breath and wheezing.    Cardiovascular: Negative.    Gastrointestinal: Negative.    Endocrine: Negative.    Genitourinary: Negative.    Musculoskeletal: Negative.    Skin: Negative.    Allergic/Immunologic: Negative.    Neurological: Negative.    Hematological: Negative.    Psychiatric/Behavioral: Negative.           Current Outpatient Medications:     cefdinir (OMNICEF) 300 MG capsule, Take 1 capsule by mouth 2 times daily for 10 days, Disp: 20 capsule, Rfl: 0    predniSONE (DELTASONE) 20 MG tablet, Take 1 tablet by mouth daily for 10 days, Disp: 10 tablet, Rfl: 0    guaiFENesin (MUCINEX) 600 MG extended release tablet, Take 2 tablets by mouth 2 times daily for 10 days, Disp: 40 tablet, Rfl: 0    insulin glargine (BASAGLAR KWIKPEN) 100 UNIT/ML injection pen, Inject 25 Units into the skin nightly 90 day supply, Disp: 60 mL, Rfl: 12    gabapentin (NEURONTIN) 300 MG capsule, Take 1 capsule by mouth at bedtime., Disp: 90 capsule, Rfl: 3    metFORMIN (GLUCOPHAGE) 500 MG tablet, Take 2 tablets by mouth 2 times daily (with meals), Disp: 360 tablet, Rfl: 3    budesonide (PULMICORT) 0.5 MG/2ML nebulizer suspension, Take 2 mLs by nebulization 2 times daily, Disp: 180 mL, Rfl: 5    formoterol (PERFOROMIST) 20 MCG/2ML nebulizer solution, Take 2 mLs by nebulization in the morning and 2 mLs in the evening.,

## 2025-03-14 ENCOUNTER — APPOINTMENT (OUTPATIENT)
Dept: CT IMAGING | Age: 70
End: 2025-03-14
Payer: MEDICARE

## 2025-03-14 ENCOUNTER — APPOINTMENT (OUTPATIENT)
Dept: GENERAL RADIOLOGY | Age: 70
End: 2025-03-14
Payer: MEDICARE

## 2025-03-14 ENCOUNTER — HOSPITAL ENCOUNTER (EMERGENCY)
Age: 70
Discharge: HOME | End: 2025-03-14
Payer: MEDICARE

## 2025-03-14 VITALS
RESPIRATION RATE: 16 BRPM | WEIGHT: 240 LBS | DIASTOLIC BLOOD PRESSURE: 89 MMHG | HEIGHT: 58 IN | BODY MASS INDEX: 50.38 KG/M2 | OXYGEN SATURATION: 96 % | SYSTOLIC BLOOD PRESSURE: 145 MMHG | TEMPERATURE: 98.1 F | HEART RATE: 89 BPM

## 2025-03-14 DIAGNOSIS — S20.212A RIB CONTUSION, LEFT, INITIAL ENCOUNTER: ICD-10-CM

## 2025-03-14 DIAGNOSIS — S80.02XA CONTUSION OF LEFT KNEE, INITIAL ENCOUNTER: ICD-10-CM
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
## 2025-03-14 PROCEDURE — 72125 CT NECK SPINE W/O DYE: CPT

## 2025-03-14 PROCEDURE — 73564 X-RAY EXAM KNEE 4 OR MORE: CPT

## 2025-03-14 PROCEDURE — 71250 CT THORAX DX C-: CPT

## 2025-03-14 PROCEDURE — 6370000000 HC RX 637 (ALT 250 FOR IP): Performed by: PHYSICIAN ASSISTANT

## 2025-03-14 PROCEDURE — 70450 CT HEAD/BRAIN W/O DYE: CPT

## 2025-03-14 PROCEDURE — 99284 EMERGENCY DEPT VISIT MOD MDM: CPT

## 2025-03-14 RX ORDER — HYDROCODONE BITARTRATE AND ACETAMINOPHEN 5; 325 MG/1; MG/1
1 TABLET ORAL ONCE
Status: COMPLETED | OUTPATIENT
Start: 2025-03-14 | End: 2025-03-14

## 2025-03-14 RX ADMIN — HYDROCODONE BITARTRATE AND ACETAMINOPHEN 1 TABLET: 5; 325 TABLET ORAL at 15:57

## 2025-03-14 ASSESSMENT — PAIN - FUNCTIONAL ASSESSMENT
PAIN_FUNCTIONAL_ASSESSMENT: 0-10
PAIN_FUNCTIONAL_ASSESSMENT: 0-10

## 2025-03-14 ASSESSMENT — PAIN DESCRIPTION - DESCRIPTORS
DESCRIPTORS: DISCOMFORT
DESCRIPTORS: SHARP

## 2025-03-14 ASSESSMENT — PAIN SCALES - GENERAL
PAINLEVEL_OUTOF10: 10
PAINLEVEL_OUTOF10: 4

## 2025-03-14 ASSESSMENT — PAIN DESCRIPTION - ORIENTATION: ORIENTATION: LEFT

## 2025-03-14 ASSESSMENT — PAIN DESCRIPTION - LOCATION
LOCATION: KNEE
LOCATION: KNEE

## 2025-03-14 ASSESSMENT — LIFESTYLE VARIABLES
HOW MANY STANDARD DRINKS CONTAINING ALCOHOL DO YOU HAVE ON A TYPICAL DAY: PATIENT DOES NOT DRINK
HOW OFTEN DO YOU HAVE A DRINK CONTAINING ALCOHOL: NEVER
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
## 2025-03-16 ENCOUNTER — RESULTS FOLLOW-UP (OUTPATIENT)
Dept: PRIMARY CARE CLINIC | Age: 70
End: 2025-03-16

## 2025-03-18 ENCOUNTER — OFFICE VISIT (OUTPATIENT)
Dept: PRIMARY CARE CLINIC | Age: 70
End: 2025-03-18
Payer: MEDICARE

## 2025-03-18 VITALS — TEMPERATURE: 97.7 F | OXYGEN SATURATION: 92 % | RESPIRATION RATE: 20 BRPM | HEART RATE: 101 BPM

## 2025-03-18 DIAGNOSIS — S20.212A CONTUSION OF LEFT CHEST WALL, INITIAL ENCOUNTER: Primary | ICD-10-CM

## 2025-03-18 DIAGNOSIS — S80.02XA CONTUSION OF LEFT KNEE, INITIAL ENCOUNTER: ICD-10-CM

## 2025-03-18 DIAGNOSIS — E11.42 TYPE 2 DIABETES MELLITUS WITH DIABETIC POLYNEUROPATHY, WITH LONG-TERM CURRENT USE OF INSULIN (HCC): ICD-10-CM

## 2025-03-18 DIAGNOSIS — J44.9 COPD WITHOUT EXACERBATION (HCC): Chronic | ICD-10-CM

## 2025-03-18 DIAGNOSIS — Z79.4 TYPE 2 DIABETES MELLITUS WITH DIABETIC POLYNEUROPATHY, WITH LONG-TERM CURRENT USE OF INSULIN (HCC): ICD-10-CM

## 2025-03-18 PROCEDURE — 99214 OFFICE O/P EST MOD 30 MIN: CPT | Performed by: FAMILY MEDICINE

## 2025-03-18 PROCEDURE — 1159F MED LIST DOCD IN RCRD: CPT | Performed by: FAMILY MEDICINE

## 2025-03-18 PROCEDURE — 1123F ACP DISCUSS/DSCN MKR DOCD: CPT | Performed by: FAMILY MEDICINE

## 2025-03-18 SDOH — ECONOMIC STABILITY: FOOD INSECURITY: WITHIN THE PAST 12 MONTHS, THE FOOD YOU BOUGHT JUST DIDN'T LAST AND YOU DIDN'T HAVE MONEY TO GET MORE.: NEVER TRUE

## 2025-03-18 SDOH — ECONOMIC STABILITY: FOOD INSECURITY: WITHIN THE PAST 12 MONTHS, YOU WORRIED THAT YOUR FOOD WOULD RUN OUT BEFORE YOU GOT MONEY TO BUY MORE.: NEVER TRUE

## 2025-03-18 NOTE — PROGRESS NOTES
needed., Disp: , Rfl:     rosuvastatin (CRESTOR) 10 MG tablet, Take 1 tablet by mouth daily, Disp: 90 tablet, Rfl: 5    fluticasone (FLONASE) 50 MCG/ACT nasal spray, 1 spray by Each Nostril route daily, Disp: 1 each, Rfl: 12    levothyroxine (SYNTHROID) 50 MCG tablet, Take 1 tablet by mouth Daily, Disp: 90 tablet, Rfl: 3    escitalopram (LEXAPRO) 10 MG tablet, One half a day for ten days then one daily thereafter, Disp: 90 tablet, Rfl: 3    hydroCHLOROthiazide (MICROZIDE) 12.5 MG capsule, Take 1 capsule by mouth daily, Disp: 90 capsule, Rfl: 5    ipratropium 0.5 mg-albuterol 2.5 mg (DUONEB) 0.5-2.5 (3) MG/3ML SOLN nebulizer solution, Inhale 3 mLs into the lungs every 4 hours, Disp: 480 mL, Rfl: 5    budesonide (PULMICORT) 0.25 MG/2ML nebulizer suspension, Take 2 mLs by nebulization 2 times daily, Disp: 60 each, Rfl: 0    hydrocortisone 2.5 % cream, Apply topically 4  times daily., Disp: 120 g, Rfl: 2    aspirin 81 MG EC tablet, Take 1 tablet by mouth daily, Disp: , Rfl:     TURMERIC PO, Take by mouth daily LD 2--20, Disp: , Rfl:     Cholecalciferol (VITAMIN D3) 2000 units CAPS, Take 5,000 Units by mouth daily Ld 2--20, Disp: , Rfl:     Coenzyme Q10 (COQ-10) 10 MG CAPS, Take by mouth daily Ld 2--20, Disp: , Rfl:   Allergies   Allergen Reactions    Macrobid [Nitrofurantoin Macrocrystal] Other (See Comments)     Exacerbates asthma     Social History     Socioeconomic History    Marital status:      Spouse name: Not on file    Number of children: Not on file    Years of education: Not on file    Highest education level: Not on file   Occupational History    Not on file   Tobacco Use    Smoking status: Former     Current packs/day: 0.00     Average packs/day: 1 pack/day for 30.0 years (30.0 ttl pk-yrs)     Types: Cigarettes     Start date: 1987     Quit date: 2017     Years since quittin.8    Smokeless tobacco: Never   Vaping Use    Vaping status: Former    Start date: 2015    Quit date:

## 2025-03-28 ENCOUNTER — OFFICE VISIT (OUTPATIENT)
Dept: PRIMARY CARE CLINIC | Age: 70
End: 2025-03-28

## 2025-03-28 VITALS
OXYGEN SATURATION: 92 % | TEMPERATURE: 97.7 F | DIASTOLIC BLOOD PRESSURE: 83 MMHG | SYSTOLIC BLOOD PRESSURE: 138 MMHG | RESPIRATION RATE: 16 BRPM | HEART RATE: 82 BPM

## 2025-03-28 DIAGNOSIS — S30.1XXD ABDOMINAL WALL HEMATOMA, SUBSEQUENT ENCOUNTER: ICD-10-CM

## 2025-03-28 DIAGNOSIS — S30.1XXD CONTUSION OF ABDOMINAL WALL, SUBSEQUENT ENCOUNTER: ICD-10-CM

## 2025-03-28 DIAGNOSIS — S20.212D CONTUSION OF LEFT CHEST WALL, SUBSEQUENT ENCOUNTER: Primary | ICD-10-CM

## 2025-03-28 DIAGNOSIS — S80.02XD CONTUSION OF LEFT KNEE, SUBSEQUENT ENCOUNTER: ICD-10-CM
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
## 2025-04-01 ENCOUNTER — TELEPHONE (OUTPATIENT)
Dept: PRIMARY CARE CLINIC | Age: 70
End: 2025-04-01

## 2025-04-01 NOTE — TELEPHONE ENCOUNTER
Pt didn't have 2 neck surgery's, her daughter did. Your  notes say aug and November of 2024. The note is under Past Surgical Hx and the last line of History of Present Illness says when the surgery's were done. Again, she didn't have surgery, her daughter did.  She did have shoulder surgery but not neck surgery.  She's asking if this can be corrected.

## 2025-04-04 DIAGNOSIS — F32.89 OTHER DEPRESSION: ICD-10-CM

## 2025-04-04 RX ORDER — ESCITALOPRAM OXALATE 10 MG/1
TABLET ORAL
Qty: 90 TABLET | Refills: 3 | Status: SHIPPED | OUTPATIENT
Start: 2025-04-04

## 2025-04-21 DIAGNOSIS — E03.9 ACQUIRED HYPOTHYROIDISM: ICD-10-CM

## 2025-04-21 DIAGNOSIS — I10 ESSENTIAL HYPERTENSION: Chronic | ICD-10-CM

## 2025-04-21 RX ORDER — LEVOTHYROXINE SODIUM 50 UG/1
50 TABLET ORAL DAILY
Qty: 90 TABLET | Refills: 3 | Status: SHIPPED | OUTPATIENT
Start: 2025-04-21

## 2025-04-21 RX ORDER — HYDROCHLOROTHIAZIDE 12.5 MG/1
12.5 CAPSULE ORAL DAILY
Qty: 90 CAPSULE | Refills: 5 | Status: SHIPPED | OUTPATIENT
Start: 2025-04-21

## 2025-04-22 ENCOUNTER — PROCEDURE VISIT (OUTPATIENT)
Dept: PODIATRY | Age: 70
End: 2025-04-22
Payer: MEDICARE

## 2025-04-22 VITALS — WEIGHT: 240 LBS | BODY MASS INDEX: 50.16 KG/M2

## 2025-04-22 DIAGNOSIS — E11.9 TYPE 2 DIABETES MELLITUS WITHOUT COMPLICATION, WITH LONG-TERM CURRENT USE OF INSULIN (HCC): Primary | ICD-10-CM

## 2025-04-22 DIAGNOSIS — M79.674 PAIN IN RIGHT TOE(S): ICD-10-CM

## 2025-04-22 DIAGNOSIS — B35.1 TINEA UNGUIUM: ICD-10-CM

## 2025-04-22 DIAGNOSIS — M79.675 PAIN IN LEFT TOE(S): ICD-10-CM

## 2025-04-22 DIAGNOSIS — R26.2 DIFFICULTY WALKING: ICD-10-CM

## 2025-04-22 DIAGNOSIS — I73.9 PERIPHERAL VASCULAR DISEASE, UNSPECIFIED: ICD-10-CM

## 2025-04-22 DIAGNOSIS — Z79.4 TYPE 2 DIABETES MELLITUS WITHOUT COMPLICATION, WITH LONG-TERM CURRENT USE OF INSULIN (HCC): Primary | ICD-10-CM

## 2025-04-22 PROCEDURE — 11721 DEBRIDE NAIL 6 OR MORE: CPT | Performed by: PODIATRIST

## 2025-04-22 NOTE — PROGRESS NOTES
[facility]  [address] [address]  Dept: [phone]  Dept Fax: [phone]  Loc: [phone]    DIABETIC NAIL PROGRESS NOTE  Date of patient's visit: 4/22/2025  Patient's Name:  Leny Amador YOB: 1955            Patient Care Team:  Adi Sesay DO as PCP - General (Family Medicine)  Adi Sesay DO as PCP - Empaneled Provider  Mason Shi MD as Consulting Physician (Pulmonology)  Freddy Chapin MD as Consulting Physician (Pulmonology)  Kevin Montanez MD as Consulting Physician (Cardiology)  Brandyn Ybarra DPM as Consulting Physician (Podiatry)          Chief Complaint   Patient presents with    Nail Problem    Toe Pain     Adi Sesay DO  LOV 4/4/25    Diabetes     Due for dm foot examination       Subjective:   Leny Amador comes to clinic for Nail Problem, Toe Pain (Adi Sesay DO/LOV 4/4/25), and Diabetes (Due for dm foot examination)    she is a diabetic and states that diabetic foot exam nail care and a painful right bunion.  Patient relates that the bunion has been bothering her off and on for many years progressively getting worse keeping her up at night.  No trauma noted..  Pt currently has complaint of thickened, elongated nails that they cannot manage by themselves.   Pt's primary care physician is Adi Sesay DO    .     Lab Results   Component Value Date    LABA1C 6.9 (H) 10/03/2024      Complains of numbness in the feet bilat.  Past Medical History:   Diagnosis Date    Anxiety     Arthritis     Asthma     COPD (chronic obstructive pulmonary disease) (HCC)     uses O2 NC 2 L continuous     Depression     Diabetes mellitus (HCC)     Edema leg     GERD (gastroesophageal reflux disease)     Hypertension     Hypothyroidism     Leukocytosis     Obesity     Osteoarthritis     Sleep apnea     no CPAP     Thyroid disease     Type 2 diabetes mellitus

## 2025-04-29 DIAGNOSIS — E11.65 TYPE 2 DIABETES MELLITUS WITH HYPERGLYCEMIA, WITH LONG-TERM CURRENT USE OF INSULIN (HCC): ICD-10-CM

## 2025-04-29 DIAGNOSIS — Z79.4 TYPE 2 DIABETES MELLITUS WITH HYPERGLYCEMIA, WITH LONG-TERM CURRENT USE OF INSULIN (HCC): ICD-10-CM

## 2025-05-01 RX ORDER — INSULIN ASPART 100 [IU]/ML
INJECTION, SOLUTION INTRAVENOUS; SUBCUTANEOUS
Qty: 30 ML | Refills: 5 | Status: SHIPPED | OUTPATIENT
Start: 2025-05-01

## 2025-05-29 DIAGNOSIS — Z79.4 TYPE 2 DIABETES MELLITUS WITH DIABETIC POLYNEUROPATHY, WITH LONG-TERM CURRENT USE OF INSULIN (HCC): ICD-10-CM

## 2025-05-29 DIAGNOSIS — E11.42 TYPE 2 DIABETES MELLITUS WITH DIABETIC POLYNEUROPATHY, WITH LONG-TERM CURRENT USE OF INSULIN (HCC): ICD-10-CM

## 2025-05-29 DIAGNOSIS — E03.9 ACQUIRED HYPOTHYROIDISM: ICD-10-CM

## 2025-05-29 DIAGNOSIS — E55.9 VITAMIN D DEFICIENCY: ICD-10-CM

## 2025-05-29 DIAGNOSIS — I10 ESSENTIAL HYPERTENSION: Chronic | ICD-10-CM

## 2025-05-29 DIAGNOSIS — E53.8 VITAMIN B 12 DEFICIENCY: ICD-10-CM

## 2025-05-29 LAB
BASOPHILS ABSOLUTE: 0.06 K/UL (ref 0–0.2)
BASOPHILS RELATIVE PERCENT: 1 % (ref 0–2)
EOSINOPHILS ABSOLUTE: 0.27 K/UL (ref 0.05–0.5)
EOSINOPHILS RELATIVE PERCENT: 3 % (ref 0–6)
HBA1C MFR BLD: 7.1 % (ref 4–5.6)
HCT VFR BLD CALC: 39.1 % (ref 34–48)
HEMOGLOBIN: 12 G/DL (ref 11.5–15.5)
IMMATURE GRANULOCYTES %: 1 % (ref 0–5)
IMMATURE GRANULOCYTES ABSOLUTE: 0.06 K/UL (ref 0–0.58)
LYMPHOCYTES ABSOLUTE: 2.43 K/UL (ref 1.5–4)
LYMPHOCYTES RELATIVE PERCENT: 28 % (ref 20–42)
MCH RBC QN AUTO: 25.9 PG (ref 26–35)
MCHC RBC AUTO-ENTMCNC: 30.7 G/DL (ref 32–34.5)
MCV RBC AUTO: 84.4 FL (ref 80–99.9)
MONOCYTES ABSOLUTE: 0.68 K/UL (ref 0.1–0.95)
MONOCYTES RELATIVE PERCENT: 8 % (ref 2–12)
NEUTROPHILS ABSOLUTE: 5.2 K/UL (ref 1.8–7.3)
NEUTROPHILS RELATIVE PERCENT: 60 % (ref 43–80)
PDW BLD-RTO: 14.1 % (ref 11.5–15)
PLATELET # BLD: 232 K/UL (ref 130–450)
PMV BLD AUTO: 13 FL (ref 7–12)
RBC # BLD: 4.63 M/UL (ref 3.5–5.5)
VITAMIN B-12: 1181 PG/ML (ref 232–1245)
WBC # BLD: 8.7 K/UL (ref 4.5–11.5)

## 2025-05-30 LAB
ALBUMIN: 3.9 G/DL (ref 3.5–5.2)
ALP BLD-CCNC: 82 U/L (ref 35–104)
ALT SERPL-CCNC: 19 U/L (ref 0–35)
ANION GAP SERPL CALCULATED.3IONS-SCNC: 12 MMOL/L (ref 7–16)
AST SERPL-CCNC: 23 U/L (ref 0–35)
BILIRUB SERPL-MCNC: <0.2 MG/DL (ref 0–1.2)
BUN BLDV-MCNC: 15 MG/DL (ref 8–23)
CALCIUM SERPL-MCNC: 9.7 MG/DL (ref 8.8–10.2)
CHLORIDE BLD-SCNC: 101 MMOL/L (ref 98–107)
CHOLESTEROL, TOTAL: 115 MG/DL
CO2: 28 MMOL/L (ref 22–29)
CREAT SERPL-MCNC: 0.9 MG/DL (ref 0.5–1)
CREATININE URINE: 32.5 MG/DL (ref 29–226)
GFR, ESTIMATED: 70 ML/MIN/1.73M2
GLUCOSE BLD-MCNC: 117 MG/DL (ref 74–99)
HDLC SERPL-MCNC: 47 MG/DL
LDL CHOLESTEROL: 26 MG/DL
MICROALBUMIN/CREAT 24H UR: <12 MG/L (ref 0–19)
MICROALBUMIN/CREAT UR-RTO: <37 MCG/MG CREAT (ref 0–30)
POTASSIUM SERPL-SCNC: 4.1 MMOL/L (ref 3.5–5.1)
SODIUM BLD-SCNC: 141 MMOL/L (ref 136–145)
THYROXINE (T4): 7.8 UG/DL (ref 4.5–11.7)
TOTAL PROTEIN: 7 G/DL (ref 6.4–8.3)
TRIGL SERPL-MCNC: 211 MG/DL
TSH SERPL DL<=0.05 MIU/L-ACNC: 2.8 UIU/ML (ref 0.27–4.2)
VITAMIN D 25-HYDROXY: 92.3 NG/ML (ref 30–100)
VLDLC SERPL CALC-MCNC: 42 MG/DL

## 2025-06-06 ENCOUNTER — OFFICE VISIT (OUTPATIENT)
Dept: PRIMARY CARE CLINIC | Age: 70
End: 2025-06-06

## 2025-06-06 VITALS
HEIGHT: 58 IN | SYSTOLIC BLOOD PRESSURE: 135 MMHG | WEIGHT: 240 LBS | HEART RATE: 96 BPM | TEMPERATURE: 97.7 F | DIASTOLIC BLOOD PRESSURE: 82 MMHG | BODY MASS INDEX: 50.38 KG/M2 | RESPIRATION RATE: 16 BRPM | OXYGEN SATURATION: 90 %

## 2025-06-06 DIAGNOSIS — J44.9 COPD WITHOUT EXACERBATION (HCC): Chronic | ICD-10-CM

## 2025-06-06 DIAGNOSIS — M19.042 PRIMARY OSTEOARTHRITIS OF BOTH HANDS: ICD-10-CM

## 2025-06-06 DIAGNOSIS — Z00.00 MEDICARE ANNUAL WELLNESS VISIT, SUBSEQUENT: Primary | ICD-10-CM

## 2025-06-06 DIAGNOSIS — I10 ESSENTIAL HYPERTENSION: Chronic | ICD-10-CM

## 2025-06-06 DIAGNOSIS — Z79.4 TYPE 2 DIABETES MELLITUS WITH DIABETIC POLYNEUROPATHY, WITH LONG-TERM CURRENT USE OF INSULIN (HCC): ICD-10-CM

## 2025-06-06 DIAGNOSIS — E78.2 MIXED HYPERLIPIDEMIA: ICD-10-CM

## 2025-06-06 DIAGNOSIS — E53.8 VITAMIN B 12 DEFICIENCY: ICD-10-CM

## 2025-06-06 DIAGNOSIS — E55.9 VITAMIN D DEFICIENCY: ICD-10-CM

## 2025-06-06 DIAGNOSIS — E03.9 ACQUIRED HYPOTHYROIDISM: ICD-10-CM

## 2025-06-06 DIAGNOSIS — E11.42 TYPE 2 DIABETES MELLITUS WITH DIABETIC POLYNEUROPATHY, WITH LONG-TERM CURRENT USE OF INSULIN (HCC): ICD-10-CM

## 2025-06-06 DIAGNOSIS — G47.33 SLEEP APNEA, OBSTRUCTIVE: ICD-10-CM

## 2025-06-06 DIAGNOSIS — M19.041 PRIMARY OSTEOARTHRITIS OF BOTH HANDS: ICD-10-CM

## 2025-06-06 DIAGNOSIS — M19.041 PRIMARY OSTEOARTHRITIS OF RIGHT HAND: ICD-10-CM

## 2025-06-06 PROBLEM — Z96.611 S/P REVERSE TOTAL SHOULDER ARTHROPLASTY, RIGHT: Status: RESOLVED | Noted: 2020-02-21 | Resolved: 2025-06-06

## 2025-06-06 PROBLEM — N18.30 CHRONIC RENAL DISEASE, STAGE III (HCC): Status: RESOLVED | Noted: 2022-06-17 | Resolved: 2025-06-06

## 2025-06-06 ASSESSMENT — PATIENT HEALTH QUESTIONNAIRE - PHQ9
SUM OF ALL RESPONSES TO PHQ QUESTIONS 1-9: 1
4. FEELING TIRED OR HAVING LITTLE ENERGY: NOT AT ALL
3. TROUBLE FALLING OR STAYING ASLEEP: NOT AT ALL
8. MOVING OR SPEAKING SO SLOWLY THAT OTHER PEOPLE COULD HAVE NOTICED. OR THE OPPOSITE, BEING SO FIGETY OR RESTLESS THAT YOU HAVE BEEN MOVING AROUND A LOT MORE THAN USUAL: NOT AT ALL
10. IF YOU CHECKED OFF ANY PROBLEMS, HOW DIFFICULT HAVE THESE PROBLEMS MADE IT FOR YOU TO DO YOUR WORK, TAKE CARE OF THINGS AT HOME, OR GET ALONG WITH OTHER PEOPLE: NOT DIFFICULT AT ALL
SUM OF ALL RESPONSES TO PHQ QUESTIONS 1-9: 1
6. FEELING BAD ABOUT YOURSELF - OR THAT YOU ARE A FAILURE OR HAVE LET YOURSELF OR YOUR FAMILY DOWN: NOT AT ALL
2. FEELING DOWN, DEPRESSED OR HOPELESS: NOT AT ALL
5. POOR APPETITE OR OVEREATING: NOT AT ALL
SUM OF ALL RESPONSES TO PHQ QUESTIONS 1-9: 1
7. TROUBLE CONCENTRATING ON THINGS, SUCH AS READING THE NEWSPAPER OR WATCHING TELEVISION: NOT AT ALL
1. LITTLE INTEREST OR PLEASURE IN DOING THINGS: SEVERAL DAYS
SUM OF ALL RESPONSES TO PHQ QUESTIONS 1-9: 1
9. THOUGHTS THAT YOU WOULD BE BETTER OFF DEAD, OR OF HURTING YOURSELF: NOT AT ALL

## 2025-06-06 NOTE — PROGRESS NOTES
Medicare Annual Wellness Visit    Leny Amador is here for Medicare AWV and Discuss Labs    Assessment & Plan  1. Annual physical examination.  Her blood count, potassium levels, kidney and liver functions are all within normal ranges. Her fasting blood glucose level was recorded at 117, which is commendable. Her cholesterol level has decreased from 118 to 115 since last year, with her HDL remaining high at 47 and her LDL well below the desired level at 26. Although her triglycerides have always been elevated, they have shown improvement from 257 to 211. There is no proteinuria present. Her vitamin D and thyroid levels are satisfactory. Her B12 levels have improved from being slightly high last year to within the normal range currently. Her A1c has decreased from 8.4 to 7.1, indicating excellent glycemic control. Her respiratory function is good, and she reports no significant concerns.    2. Diabetes Mellitus.  Her A1c has decreased from 8.4 to 7.1, indicating excellent glycemic control.  Her fasting blood glucose level was recorded at 117.  She reports morning blood glucose levels varying from 120 to 150.  She is advised to continue her current medication regimen, including Basaglar 30 units daily and sliding scale insulin as directed by her endocrinologist.    3. Hypertension.  Her blood pressure is well-controlled at 134/72 mmHg.  She should continue her current medications, including Valsartan and hydrochlorothiazide.  No pitting edema was noted on physical examination.  She reports no significant concerns regarding her blood pressure management.    4. Hyperlipidemia.  Her cholesterol levels are well-managed with a total cholesterol of 115, HDL of 47, LDL of 26, and triglycerides reduced from 257 to 211.  She should continue taking rosuvastatin (Crestor).  Her cholesterol levels have shown improvement since last year.  She reports no significant concerns regarding her lipid management.    5. Chronic Pain.  She

## 2025-06-06 NOTE — PATIENT INSTRUCTIONS
or  can help you develop a safe and effective exercise program.  A counselor or psychiatrist can help you cope with issues such as depression, anxiety, or family problems that can make it hard to focus on weight loss.  Consider joining a support group for people who are trying to lose weight. Your doctor can suggest groups in your area.  Where can you learn more?  Go to https://[email or web address]/patientEd and enter U357 to learn more about \"Starting a Weight-Loss Plan: Care Instructions.\"  Current as of: April 30, 2024  Content Version: 14.5  © 2232-1016 [employer].   Care instructions adapted under license by [employer]. If you have questions about a medical condition or this instruction, always ask your healthcare professional. [employer] disclaims any warranty or liability for your use of this information.         A Healthy Heart: Care Instructions  Overview     Coronary artery disease, also called heart disease, occurs when a substance called plaque builds up in the vessels that supply oxygen-rich blood to your heart muscle. This can narrow the blood vessels and reduce blood flow. A heart attack happens when blood flow is completely blocked. A high-fat diet, smoking, and other factors increase the risk of heart disease.  Your doctor has found that you have a chance of having heart disease. A heart-healthy lifestyle can help keep your heart healthy and prevent heart disease. This lifestyle includes eating healthy, being active, staying at a weight that's healthy for you, and not smoking or using tobacco. It also includes taking medicines as directed, managing other health conditions, and trying to get a healthy amount of sleep.  Follow-up care is a key part of your treatment and safety. Be sure to make and go to all appointments, and call your doctor if you are having problems. It's also a good idea to know your test results and keep a list of the medicines you

## 2025-06-09 ENCOUNTER — TELEPHONE (OUTPATIENT)
Dept: PRIMARY CARE CLINIC | Age: 70
End: 2025-06-09

## 2025-06-09 NOTE — TELEPHONE ENCOUNTER
Pt uses the freestyle sandra 2 and in order for her insurance to cover her medications, she has to send office notes saying she is benefiting from using the freestyle sandra 2 and that note, the last office note also, have to be faxed to [facility] at [phone] (fax)  [phone]  I didn't see that part in your notes, probably need an addendum .

## 2025-06-09 NOTE — TELEPHONE ENCOUNTER
Tell patient we did the addendum and go ahead and fax it but tell her we do not prescribe that particular device to her she gets it from her endocrinologist.  Not sure they will accept her note

## 2025-06-10 NOTE — TELEPHONE ENCOUNTER
LM to return call.  OV note addended and faxed to [facility], however, not certain if they will even accept it since she the device is ordered from her endocrinologist

## 2025-06-23 ENCOUNTER — OFFICE VISIT (OUTPATIENT)
Dept: PRIMARY CARE CLINIC | Age: 70
End: 2025-06-23
Payer: MEDICARE

## 2025-06-23 DIAGNOSIS — N30.01 ACUTE CYSTITIS WITH HEMATURIA: Primary | ICD-10-CM

## 2025-06-23 LAB
BILIRUBIN, POC: NORMAL
BLOOD URINE, POC: NORMAL
CLARITY, POC: NORMAL
COLOR, POC: NORMAL
GLUCOSE URINE, POC: NORMAL MG/DL
KETONES, POC: NORMAL MG/DL
LEUKOCYTE EST, POC: NORMAL
NITRITE, POC: NORMAL
PH, POC: 6
PROTEIN, POC: NORMAL MG/DL
SPECIFIC GRAVITY, POC: 1.01
UROBILINOGEN, POC: 0.2 MG/DL

## 2025-06-23 PROCEDURE — 3075F SYST BP GE 130 - 139MM HG: CPT | Performed by: FAMILY MEDICINE

## 2025-06-23 PROCEDURE — 3079F DIAST BP 80-89 MM HG: CPT | Performed by: FAMILY MEDICINE

## 2025-06-23 PROCEDURE — 1123F ACP DISCUSS/DSCN MKR DOCD: CPT | Performed by: FAMILY MEDICINE

## 2025-06-23 PROCEDURE — 99213 OFFICE O/P EST LOW 20 MIN: CPT | Performed by: FAMILY MEDICINE

## 2025-06-23 PROCEDURE — 81002 URINALYSIS NONAUTO W/O SCOPE: CPT | Performed by: FAMILY MEDICINE

## 2025-06-23 RX ORDER — CEFDINIR 300 MG/1
300 CAPSULE ORAL 2 TIMES DAILY
Qty: 14 CAPSULE | Refills: 0 | Status: SHIPPED | OUTPATIENT
Start: 2025-06-23
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
## 2025-06-24 VITALS
OXYGEN SATURATION: 93 % | TEMPERATURE: 98.6 F | RESPIRATION RATE: 16 BRPM | DIASTOLIC BLOOD PRESSURE: 82 MMHG | HEART RATE: 89 BPM | SYSTOLIC BLOOD PRESSURE: 135 MMHG

## 2025-06-24 ASSESSMENT — ENCOUNTER SYMPTOMS
EYES NEGATIVE: 1
GASTROINTESTINAL NEGATIVE: 1
ALLERGIC/IMMUNOLOGIC NEGATIVE: 1
RESPIRATORY NEGATIVE: 1

## 2025-06-25 LAB
CULTURE: ABNORMAL
SPECIMEN DESCRIPTION: ABNORMAL

## 2025-06-30 DIAGNOSIS — E78.2 MIXED HYPERLIPIDEMIA: ICD-10-CM

## 2025-07-01 RX ORDER — ROSUVASTATIN CALCIUM 10 MG/1
10 TABLET, COATED ORAL DAILY
Qty: 90 TABLET | Refills: 5 | Status: SHIPPED | OUTPATIENT
Start: 2025-07-01

## 2025-07-11 ENCOUNTER — OFFICE VISIT (OUTPATIENT)
Dept: PRIMARY CARE CLINIC | Age: 70
End: 2025-07-11

## 2025-07-11 VITALS
SYSTOLIC BLOOD PRESSURE: 135 MMHG | BODY MASS INDEX: 50.38 KG/M2 | RESPIRATION RATE: 18 BRPM | HEIGHT: 58 IN | HEART RATE: 90 BPM | WEIGHT: 240 LBS | OXYGEN SATURATION: 94 % | DIASTOLIC BLOOD PRESSURE: 82 MMHG | TEMPERATURE: 98.6 F

## 2025-07-11 DIAGNOSIS — Z79.4 TYPE 2 DIABETES MELLITUS WITH DIABETIC POLYNEUROPATHY, WITH LONG-TERM CURRENT USE OF INSULIN (HCC): ICD-10-CM

## 2025-07-11 DIAGNOSIS — E55.9 VITAMIN D DEFICIENCY: ICD-10-CM

## 2025-07-11 DIAGNOSIS — E53.8 VITAMIN B 12 DEFICIENCY: ICD-10-CM

## 2025-07-11 DIAGNOSIS — I10 ESSENTIAL HYPERTENSION: Chronic | ICD-10-CM

## 2025-07-11 DIAGNOSIS — J44.9 COPD WITHOUT EXACERBATION (HCC): Chronic | ICD-10-CM

## 2025-07-11 DIAGNOSIS — E03.9 ACQUIRED HYPOTHYROIDISM: ICD-10-CM

## 2025-07-11 DIAGNOSIS — N30.01 ACUTE CYSTITIS WITH HEMATURIA: Primary | ICD-10-CM

## 2025-07-11 DIAGNOSIS — E11.42 TYPE 2 DIABETES MELLITUS WITH DIABETIC POLYNEUROPATHY, WITH LONG-TERM CURRENT USE OF INSULIN (HCC): ICD-10-CM

## 2025-07-11 LAB
APPEARANCE FLUID: CLEAR
BILIRUBIN, POC: NEGATIVE
BLOOD URINE, POC: NEGATIVE
CLARITY, POC: CLEAR
COLOR, POC: YELLOW
GLUCOSE URINE, POC: NEGATIVE MG/DL
KETONES, POC: NEGATIVE MG/DL
LEUKOCYTE EST, POC: NORMAL
NITRITE, POC: NEGATIVE
PH, POC: 5.5
PROTEIN, POC: NEGATIVE MG/DL
SPECIFIC GRAVITY, POC: 1.01
UROBILINOGEN, POC: 0.2 MG/DL

## 2025-07-11 SDOH — ECONOMIC STABILITY: FOOD INSECURITY: WITHIN THE PAST 12 MONTHS, YOU WORRIED THAT YOUR FOOD WOULD RUN OUT BEFORE YOU GOT MONEY TO BUY MORE.: NEVER TRUE

## 2025-07-11 SDOH — ECONOMIC STABILITY: FOOD INSECURITY: WITHIN THE PAST 12 MONTHS, THE FOOD YOU BOUGHT JUST DIDN'T LAST AND YOU DIDN'T HAVE MONEY TO GET MORE.: NEVER TRUE

## 2025-07-11 ASSESSMENT — PATIENT HEALTH QUESTIONNAIRE - PHQ9
5. POOR APPETITE OR OVEREATING: NOT AT ALL
7. TROUBLE CONCENTRATING ON THINGS, SUCH AS READING THE NEWSPAPER OR WATCHING TELEVISION: NOT AT ALL
4. FEELING TIRED OR HAVING LITTLE ENERGY: NOT AT ALL
SUM OF ALL RESPONSES TO PHQ QUESTIONS 1-9: 0
1. LITTLE INTEREST OR PLEASURE IN DOING THINGS: NOT AT ALL
6. FEELING BAD ABOUT YOURSELF - OR THAT YOU ARE A FAILURE OR HAVE LET YOURSELF OR YOUR FAMILY DOWN: NOT AT ALL
2. FEELING DOWN, DEPRESSED OR HOPELESS: NOT AT ALL
SUM OF ALL RESPONSES TO PHQ QUESTIONS 1-9: 0
10. IF YOU CHECKED OFF ANY PROBLEMS, HOW DIFFICULT HAVE THESE PROBLEMS MADE IT FOR YOU TO DO YOUR WORK, TAKE CARE OF THINGS AT HOME, OR GET ALONG WITH OTHER PEOPLE: NOT DIFFICULT AT ALL
9. THOUGHTS THAT YOU WOULD BE BETTER OFF DEAD, OR OF HURTING YOURSELF: NOT AT ALL
8. MOVING OR SPEAKING SO SLOWLY THAT OTHER PEOPLE COULD HAVE NOTICED. OR THE OPPOSITE, BEING SO FIGETY OR RESTLESS THAT YOU HAVE BEEN MOVING AROUND A LOT MORE THAN USUAL: NOT AT ALL
SUM OF ALL RESPONSES TO PHQ QUESTIONS 1-9: 0
SUM OF ALL RESPONSES TO PHQ QUESTIONS 1-9: 0
3. TROUBLE FALLING OR STAYING ASLEEP: NOT AT ALL

## 2025-07-11 NOTE — PROGRESS NOTES
risk associated with noncompliance of medications and taking medications incorrectly.  Appropriate follow-up with myself and all specialist.  Encourage family members to take active role in assisting with medications and medical care.  If any confusion should develop, to notify my office immediately to avoid risk of worsening medical condition      A great deal of time spent reviewing medications, diet, exercise, social issues. Also reviewing the chart before entering the room with patient and finishing charting after leaving patient's room. More than half of that time was spent face to face with the patient in counseling and coordinating care.      Educational materials and/or home exercises printed for patient's review and were included in patient instructions on his/her After Visit Summary and given to patient at the end of visit.      Counseled regarding above diagnosis, including possible risks and complications,  especially if left uncontrolled.    Counseled regarding the possible side effects, risks, benefits and alternatives to treatment; patient and/or guardian verbalizes understanding, agrees, feels comfortable with and wishes to proceed with above treatment plan.    Advised patient to call with any new medication issues, and read all Rx info from pharmacy to assure aware of all possible risks and side effects of medication before taking.    Reviewed age and gender appropriate health screening exams and vaccinations.  Advised patient regarding importance of keeping up with recommended health maintenance and to schedule as soon as possible if overdue, as this is important in assessing for undiagnosed pathology, especially cancer, as well as protecting against potentially harmful/life threatening disease.        Patient and/or guardian verbalizes understanding and agrees with above counseling, assessment and plan.    All questions answered.      I have personally reviewed and updated the chief complaint,

## 2025-07-12 LAB
CULTURE: NORMAL
CULTURE: NORMAL
SPECIMEN DESCRIPTION: NORMAL

## 2025-07-28 DIAGNOSIS — M19.011 PRIMARY OSTEOARTHRITIS OF RIGHT SHOULDER: ICD-10-CM

## 2025-07-28 RX ORDER — CELECOXIB 200 MG/1
200 CAPSULE ORAL DAILY
Qty: 90 CAPSULE | Refills: 3 | Status: SHIPPED | OUTPATIENT
Start: 2025-07-28

## 2025-07-30 DIAGNOSIS — E03.9 ACQUIRED HYPOTHYROIDISM: ICD-10-CM

## 2025-07-31 RX ORDER — LEVOTHYROXINE SODIUM 50 UG/1
50 TABLET ORAL DAILY
Qty: 90 TABLET | Refills: 3 | Status: SHIPPED | OUTPATIENT
Start: 2025-07-31

## 2025-09-03 ENCOUNTER — PROCEDURE VISIT (OUTPATIENT)
Dept: PODIATRY | Age: 70
End: 2025-09-03

## 2025-09-03 VITALS
SYSTOLIC BLOOD PRESSURE: 149 MMHG | WEIGHT: 240 LBS | TEMPERATURE: 97.9 F | DIASTOLIC BLOOD PRESSURE: 75 MMHG | BODY MASS INDEX: 50.17 KG/M2

## 2025-09-03 DIAGNOSIS — M79.675 PAIN IN LEFT TOE(S): ICD-10-CM

## 2025-09-03 DIAGNOSIS — Z79.4 TYPE 2 DIABETES MELLITUS WITHOUT COMPLICATION, WITH LONG-TERM CURRENT USE OF INSULIN (HCC): ICD-10-CM

## 2025-09-03 DIAGNOSIS — I73.9 PERIPHERAL VASCULAR DISEASE, UNSPECIFIED: ICD-10-CM

## 2025-09-03 DIAGNOSIS — S90.111A CONTUSION OF RIGHT GREAT TOE WITHOUT DAMAGE TO NAIL, INITIAL ENCOUNTER: Primary | ICD-10-CM

## 2025-09-03 DIAGNOSIS — E11.9 TYPE 2 DIABETES MELLITUS WITHOUT COMPLICATION, WITH LONG-TERM CURRENT USE OF INSULIN (HCC): ICD-10-CM

## 2025-09-03 DIAGNOSIS — M79.674 PAIN IN RIGHT TOE(S): ICD-10-CM

## 2025-09-03 DIAGNOSIS — B35.1 TINEA UNGUIUM: ICD-10-CM

## 2025-09-03 DIAGNOSIS — R26.2 DIFFICULTY WALKING: ICD-10-CM

## 2025-09-03 DIAGNOSIS — S92.531G: ICD-10-CM

## (undated) DEVICE — BASIC SINGLE BASIN 1-LF: Brand: MEDLINE INDUSTRIES, INC.

## (undated) DEVICE — 3M™ COBAN™ NL STERILE NON-LATEX SELF-ADHERENT WRAP, 2084S, 4 IN X 5 YD (10 CM X 4,5 M), 18 ROLLS/CASE: Brand: 3M™ COBAN™

## (undated) DEVICE — 4-PORT MANIFOLD: Brand: NEPTUNE 2

## (undated) DEVICE — Device

## (undated) DEVICE — 2108 SERIES SAGITTAL BLADE, OFFSET (20.0 X 0.89 X 80.0MM)

## (undated) DEVICE — TUBING, SUCTION, 9/32" X 10', STRAIGHT: Brand: MEDLINE

## (undated) DEVICE — BANDAGE COMPR W3INXL5YD WHT BGE POLY COT M E WRP WV HK AND

## (undated) DEVICE — SPONGE LAP W18XL18IN WHT COT 4 PLY FLD STRUNG RADPQ DISP ST

## (undated) DEVICE — PATIENT RETURN ELECTRODE, SINGLE-USE, CONTACT QUALITY MONITORING, ADULT, WITH 9FT CORD, FOR PATIENTS WEIGING OVER 33LBS. (15KG): Brand: MEGADYNE

## (undated) DEVICE — SYRINGE IRRIG 60ML SFT PLIABLE BLB EZ TO GRP 1 HND USE W/

## (undated) DEVICE — CHLORAPREP 26ML ORANGE

## (undated) DEVICE — SHOULDER STABILIZATION KIT,                                    DISPOSABLE 12 PER BOX

## (undated) DEVICE — ELECTROSURGICAL PENCIL BUTTON SWITCH E-Z CLEAN COATED BLADE ELECTRODE 10 FT (3 M) CORD HOLSTER: Brand: MEGADYNE

## (undated) DEVICE — SHOECOVER ANTI-SKID: Brand: CARDINAL HEALTH

## (undated) DEVICE — GOWN,SIRUS,FABRNF,XL,20/CS: Brand: MEDLINE

## (undated) DEVICE — ALCOHOL RUBBING ISO 16OZ 70%

## (undated) DEVICE — CONTROL SYRINGE LUER-LOCK TIP: Brand: MONOJECT

## (undated) DEVICE — COVER HNDL LT DISP

## (undated) DEVICE — Z INACTIVE USE 2660664 SOLUTION IRRIG 3000ML 0.9% SOD CHL USP UROMATIC PLAS CONT

## (undated) DEVICE — MATERIAL CAST W3INXL125FT 8 15MMHG BLK SUPP SEAMLESS ULT SHR

## (undated) DEVICE — SURGICAL PROCEDURE PACK HND

## (undated) DEVICE — BIT DRL DIA4MM CALIB GRAD DEPTH MRK FOR ENCORE SYS

## (undated) DEVICE — DRAPE,REIN 53X77,STERILE: Brand: MEDLINE

## (undated) DEVICE — PACK PROCEDURE SURG GEN CUST

## (undated) DEVICE — TUBE IRRIG HNDPC HI FLO TP INTRPULS W/SUCTION TUBE

## (undated) DEVICE — STANDARD HYPODERMIC NEEDLE,POLYPROPYLENE HUB: Brand: MONOJECT

## (undated) DEVICE — 3M™ STERI-DRAPE™ U-DRAPE 1015: Brand: STERI-DRAPE™

## (undated) DEVICE — GOWN,SIRUS,FABRNF,2XL,18/CS: Brand: MEDLINE

## (undated) DEVICE — CUFF TOURNIQUET 18 SNG BLADDER DUAL PORT

## (undated) DEVICE — GOWN,SIRUS,POLYRNF,BRTHSLV,XL,30/CS: Brand: MEDLINE

## (undated) DEVICE — GAUZE,SPONGE,4"X4",8PLY,STRL,LF,10/TRAY: Brand: MEDLINE

## (undated) DEVICE — INTENDED FOR TISSUE SEPARATION, AND OTHER PROCEDURES THAT REQUIRE A SHARP SURGICAL BLADE TO PUNCTURE OR CUT.: Brand: BARD-PARKER ® STAINLESS STEEL BLADES

## (undated) DEVICE — TOWEL,OR,DSP,ST,BLUE,STD,6/PK,12PK/CS: Brand: MEDLINE

## (undated) DEVICE — GLOVE SURG 8.5 LTX TRIFLEX WIDE FINGER PWDR

## (undated) DEVICE — SPLINT ORTH W4XL15IN PLSTR OF PARIS LO EXOTHERM SMOOTH

## (undated) DEVICE — SYRINGE 20ML LL S/C 50

## (undated) DEVICE — Z DUP USE 2257490 ADHESIVE SKIN CLSRE 036ML TPCL 2CTL CNCRLTE HIGH VSCSTY DRMB

## (undated) DEVICE — DRAPE,U/ SHT,SPLIT,PLAS,STERIL: Brand: MEDLINE

## (undated) DEVICE — BIT DRL 25X127MM

## (undated) DEVICE — CONVERTORS STOCKINETTE: Brand: CONVERTORS

## (undated) DEVICE — 3M™ IOBAN™ 2 ANTIMICROBIAL INCISE DRAPE 6650EZ: Brand: IOBAN™ 2

## (undated) DEVICE — PACK,SHOULDER SPLIT: Brand: MEDLINE

## (undated) DEVICE — DOUBLE BASIN SET: Brand: MEDLINE INDUSTRIES, INC.

## (undated) DEVICE — BLADE CLIPPER GEN PURP NS

## (undated) DEVICE — SOLUTION IV IRRIG WATER 1000ML POUR BRL 2F7114

## (undated) DEVICE — ELECTRODE PT RET AD L9FT HI MOIST COND ADH HYDRGEL CORDED

## (undated) DEVICE — PADDING,UNDERCAST,COTTON, 3X4YD STERILE: Brand: MEDLINE

## (undated) DEVICE — GRADUATE